# Patient Record
Sex: FEMALE | Race: WHITE
[De-identification: names, ages, dates, MRNs, and addresses within clinical notes are randomized per-mention and may not be internally consistent; named-entity substitution may affect disease eponyms.]

---

## 2017-03-22 ENCOUNTER — HOSPITAL ENCOUNTER (INPATIENT)
Dept: HOSPITAL 53 - M ED | Age: 82
LOS: 51 days | Discharge: HOME HEALTH | DRG: 881 | End: 2017-05-12
Attending: PSYCHIATRY & NEUROLOGY | Admitting: PSYCHIATRY & NEUROLOGY
Payer: MEDICARE

## 2017-03-22 VITALS — WEIGHT: 139.77 LBS | HEIGHT: 61 IN | BODY MASS INDEX: 26.39 KG/M2

## 2017-03-22 DIAGNOSIS — Z79.899: ICD-10-CM

## 2017-03-22 DIAGNOSIS — H61.23: ICD-10-CM

## 2017-03-22 DIAGNOSIS — R07.9: ICD-10-CM

## 2017-03-22 DIAGNOSIS — D64.9: ICD-10-CM

## 2017-03-22 DIAGNOSIS — Z88.8: ICD-10-CM

## 2017-03-22 DIAGNOSIS — R19.7: ICD-10-CM

## 2017-03-22 DIAGNOSIS — Z91.14: ICD-10-CM

## 2017-03-22 DIAGNOSIS — Z79.84: ICD-10-CM

## 2017-03-22 DIAGNOSIS — E11.9: ICD-10-CM

## 2017-03-22 DIAGNOSIS — Z90.49: ICD-10-CM

## 2017-03-22 DIAGNOSIS — R26.81: ICD-10-CM

## 2017-03-22 DIAGNOSIS — K21.9: ICD-10-CM

## 2017-03-22 DIAGNOSIS — M10.9: ICD-10-CM

## 2017-03-22 DIAGNOSIS — F32.9: Primary | ICD-10-CM

## 2017-03-22 DIAGNOSIS — E78.5: ICD-10-CM

## 2017-03-22 DIAGNOSIS — Z90.710: ICD-10-CM

## 2017-03-22 DIAGNOSIS — E03.9: ICD-10-CM

## 2017-03-22 DIAGNOSIS — I25.10: ICD-10-CM

## 2017-03-22 DIAGNOSIS — E87.1: ICD-10-CM

## 2017-03-22 DIAGNOSIS — I10: ICD-10-CM

## 2017-03-22 LAB
ALBUMIN SERPL BCG-MCNC: 4 GM/DL (ref 3.2–5.2)
ALBUMIN/GLOB SERPL: 1.43 {RATIO} (ref 1–1.93)
ALP SERPL-CCNC: 56 U/L (ref 45–117)
ALT SERPL W P-5'-P-CCNC: 21 U/L (ref 12–78)
ANION GAP SERPL CALC-SCNC: 9 MEQ/L (ref 8–16)
AST SERPL-CCNC: 15 U/L (ref 15–37)
BILIRUB CONJ SERPL-MCNC: 0.1 MG/DL (ref 0–0.2)
BILIRUB SERPL-MCNC: 0.5 MG/DL (ref 0.2–1)
BUN SERPL-MCNC: 14 MG/DL (ref 7–18)
CALCIUM SERPL-MCNC: 9 MG/DL (ref 8.8–10.2)
CHLORIDE SERPL-SCNC: 99 MEQ/L (ref 98–107)
CO2 SERPL-SCNC: 28 MEQ/L (ref 21–32)
CREAT SERPL-MCNC: 0.63 MG/DL (ref 0.55–1.02)
ERYTHROCYTE [DISTWIDTH] IN BLOOD BY AUTOMATED COUNT: 12.6 % (ref 11.5–14.5)
GFR SERPL CREATININE-BSD FRML MDRD: > 60 ML/MIN/{1.73_M2} (ref 32–?)
GLUCOSE SERPL-MCNC: 154 MG/DL (ref 83–110)
MCH RBC QN AUTO: 31.3 PG (ref 27–33)
MCHC RBC AUTO-ENTMCNC: 33.8 G/DL (ref 32–36.5)
MCV RBC AUTO: 92.4 FL (ref 80–96)
METHADONE UR QL SCN: NEGATIVE
PLATELET # BLD AUTO: 256 K/MM3 (ref 150–450)
POTASSIUM SERPL-SCNC: 3.7 MEQ/L (ref 3.5–5.1)
PROT SERPL-MCNC: 6.8 GM/DL (ref 6.4–8.2)
SODIUM SERPL-SCNC: 136 MEQ/L (ref 136–145)
WBC # BLD AUTO: 5.8 K/MM3 (ref 4–10)

## 2017-03-22 RX ADMIN — MONTELUKAST SODIUM SCH MG: 10 TABLET, FILM COATED ORAL at 21:00

## 2017-03-22 RX ADMIN — ROSUVASTATIN CALCIUM SCH MG: 10 TABLET, FILM COATED ORAL at 21:00

## 2017-03-23 VITALS — DIASTOLIC BLOOD PRESSURE: 68 MMHG | SYSTOLIC BLOOD PRESSURE: 182 MMHG

## 2017-03-23 VITALS — DIASTOLIC BLOOD PRESSURE: 72 MMHG | SYSTOLIC BLOOD PRESSURE: 149 MMHG

## 2017-03-23 VITALS — SYSTOLIC BLOOD PRESSURE: 149 MMHG | DIASTOLIC BLOOD PRESSURE: 76 MMHG

## 2017-03-23 VITALS — DIASTOLIC BLOOD PRESSURE: 92 MMHG | SYSTOLIC BLOOD PRESSURE: 195 MMHG

## 2017-03-23 VITALS — SYSTOLIC BLOOD PRESSURE: 154 MMHG | DIASTOLIC BLOOD PRESSURE: 79 MMHG

## 2017-03-23 LAB
ALBUMIN SERPL BCG-MCNC: 4.2 GM/DL (ref 3.2–5.2)
ALBUMIN/GLOB SERPL: 1.5 {RATIO} (ref 1–1.93)
ALP SERPL-CCNC: 58 U/L (ref 45–117)
ALT SERPL W P-5'-P-CCNC: 21 U/L (ref 12–78)
ANION GAP SERPL CALC-SCNC: 9 MEQ/L (ref 8–16)
AST SERPL-CCNC: 15 U/L (ref 15–37)
BILIRUB SERPL-MCNC: 0.6 MG/DL (ref 0.2–1)
BUN SERPL-MCNC: 12 MG/DL (ref 7–18)
CALCIUM SERPL-MCNC: 9.6 MG/DL (ref 8.8–10.2)
CHLORIDE SERPL-SCNC: 98 MEQ/L (ref 98–107)
CO2 SERPL-SCNC: 28 MEQ/L (ref 21–32)
CREAT SERPL-MCNC: 0.67 MG/DL (ref 0.55–1.02)
ERYTHROCYTE [DISTWIDTH] IN BLOOD BY AUTOMATED COUNT: 12.5 % (ref 11.5–14.5)
GFR SERPL CREATININE-BSD FRML MDRD: > 60 ML/MIN/{1.73_M2} (ref 32–?)
GLUCOSE SERPL-MCNC: 146 MG/DL (ref 83–110)
MCH RBC QN AUTO: 31.2 PG (ref 27–33)
MCHC RBC AUTO-ENTMCNC: 34 G/DL (ref 32–36.5)
MCV RBC AUTO: 91.8 FL (ref 80–96)
PLATELET # BLD AUTO: 281 K/MM3 (ref 150–450)
POTASSIUM SERPL-SCNC: 4 MEQ/L (ref 3.5–5.1)
PROT SERPL-MCNC: 7 GM/DL (ref 6.4–8.2)
SODIUM SERPL-SCNC: 135 MEQ/L (ref 136–145)
URATE SERPL-MCNC: 2.8 MG/DL (ref 2.6–6)
WBC # BLD AUTO: 7.1 K/MM3 (ref 4–10)

## 2017-03-23 RX ADMIN — LISINOPRIL SCH MG: 5 TABLET ORAL at 08:35

## 2017-03-23 RX ADMIN — METFORMIN HYDROCHLORIDE SCH MG: 500 TABLET, EXTENDED RELEASE ORAL at 08:35

## 2017-03-23 RX ADMIN — DOCUSATE SODIUM SCH MG: 100 CAPSULE, LIQUID FILLED ORAL at 08:35

## 2017-03-23 RX ADMIN — LABETALOL HCL SCH MG: 200 TABLET, FILM COATED ORAL at 21:37

## 2017-03-23 RX ADMIN — OXYCODONE HYDROCHLORIDE AND ACETAMINOPHEN SCH MG: 500 TABLET ORAL at 08:35

## 2017-03-23 RX ADMIN — OXYCODONE HYDROCHLORIDE AND ACETAMINOPHEN SCH MG: 500 TABLET ORAL at 21:37

## 2017-03-23 RX ADMIN — MONTELUKAST SODIUM SCH MG: 10 TABLET, FILM COATED ORAL at 21:37

## 2017-03-23 RX ADMIN — LEVOTHYROXINE SODIUM SCH MG: 25 TABLET ORAL at 06:15

## 2017-03-23 RX ADMIN — LABETALOL HCL SCH MG: 200 TABLET, FILM COATED ORAL at 08:35

## 2017-03-23 RX ADMIN — CARBAMIDE PEROXIDE 6.5% SCH DROP: 6.5 LIQUID AURICULAR (OTIC) at 21:00

## 2017-03-23 RX ADMIN — FERROUS SULFATE TAB 325 MG (65 MG ELEMENTAL FE) SCH MG: 325 (65 FE) TAB at 08:35

## 2017-03-23 RX ADMIN — ALLOPURINOL SCH MG: 300 TABLET ORAL at 08:35

## 2017-03-23 RX ADMIN — ROSUVASTATIN CALCIUM SCH MG: 10 TABLET, FILM COATED ORAL at 21:37

## 2017-03-23 RX ADMIN — CARBAMIDE PEROXIDE 6.5% SCH DROP: 6.5 LIQUID AURICULAR (OTIC) at 09:00

## 2017-03-23 RX ADMIN — PANTOPRAZOLE SODIUM SCH MG: 40 TABLET, DELAYED RELEASE ORAL at 08:35

## 2017-03-23 RX ADMIN — ALUMINUM HYDROXIDE, MAGNESIUM HYDROXIDE, AND SIMETHICONE PRN ML: 200; 200; 20 SUSPENSION ORAL at 08:43

## 2017-03-23 RX ADMIN — METFORMIN HYDROCHLORIDE SCH MG: 500 TABLET, EXTENDED RELEASE ORAL at 17:41

## 2017-03-23 RX ADMIN — DOCUSATE SODIUM SCH MG: 100 CAPSULE, LIQUID FILLED ORAL at 21:37

## 2017-03-23 RX ADMIN — OXYCODONE HYDROCHLORIDE AND ACETAMINOPHEN SCH MG: 500 TABLET ORAL at 15:53

## 2017-03-23 NOTE — MHHPE
DATE OF ADMISSION:  03/22/2017

 

This 81-year-old female was admitted for increased anxiety and depression and

stating to her family that she was "waiting to die."  This patient apparently,

for an unknown amount of time, has stopped taking her medications at home.  She

has had poor sleep.  She has had a history of depression and catatonia and was

treated last April.  She has stopped eating and drinking and taking her

medications.  She had a fall and broke her hip and was recently discharged from

rehabilitation.  About 3 days ago, it was noticed that she had stopped sleeping,

that she would not leave her house, she would not use her dentures, she stopped

using lipstick, she stopped smiling and laughing.  The patient had been on

Effexor, she was not sure how long she has been on it, 150 mg.  Medical history

has been positive for back surgery, hand surgery, thyroid abnormalities, high

blood pressure, and three coronary artery bypass grafts (CABGs).  She does not

remember her psychiatric history.  She has had, in the past, electroshock 
therapy

for her depression.  She saw a psychiatrist in [place], does not remember when.

Alcohol history is negative.  Drug history is negative.  She has been here from

[place] since June.  Her daughter, Dina, visits her on and off.  Her 
appetite

is down.  Her sleep is poor, she has had initial insomnia.  She is denying

suicidal ideation and has not had psychiatric followup for financial reasons and

gets her medicine from her primary care physician.  She reported some chest

heaviness on admission and was seen by Sylvia De Santiago.  She complained of some

gas.

 

MEDICAL HISTORY:  Includes depression, gout, hypertension, dyslipidemia,

hypothyroidism, non-insulin-dependent diabetes mellitus (NIDDM), coronary artery

disease (CAD), allergies, iron deficiency, history of left hip fracture.

 

SURGICAL HISTORY:  Tonsillectomy, cholecystectomy, appendectomy, nailing of left

hip, spine surgery, and hysterectomy.

 

SOCIAL HISTORY:  She is .  Nonsmoker.  Denies illicit drug use.  Resides

in [place], [place].

 

PRESENT MEDICATIONS:  Include:

- allopurinol

- calcium

- ferrous sulfate

- labetalol

- levothyroxine

- lisinopril

- metformin

- multivitamins

- montelukast

- pantoprazole

- venlafaxine

- rosuvastatin

 

MENTAL STATUS EXAMINATION:  Speech is slow, but no abnormalities of thought

process noted.  She has no loose associations.  She does not seem to be

demonstrating any abnormal or psychotic thoughts.  Judgment and insight are 
fair.

She is fully oriented.  Recent and remote memory are intact.  No disturbances of

attention or concentration.  No disturbances of language.  She has a full fund 
of

knowledge.  Her mood is low.  Her affect is flat.

 

PLAN:  Due to patient's past history of catatonia requiring electroconvulsive

therapy (ECT), we will restart her venlafaxine. Venlafaxine dose may have to be 
raised, we will observe.

I met with pts. daughter to review the history and medications used in the past 
unsuccessfully.

 

DIAGNOSIS:  Major depressive illness.

MTDD

## 2017-03-23 NOTE — HPEPDOC
Medical History and Physical


Date of Admission


Mar 22, 2017 at 22:40





History and Physical





PCP: Dr Nogueira


ATTENDING: Dr. Kristopher Connell





HPI: 81yoF admitted to [facility] for MDD, being medically examined today. Patient 

reported to her nurse this morning that she had some chest heaviness. Patient 

describes this as a pressure in her chest. She is unable to state if it changes 

with swallowing, pressing her chest, or change in position. She does not report 

that it worsens with exertion. She does not know how long it has been going on. 

She states it has been  "on and off " , she does not know for how long. She 

does not report any associated shortness of breath, radiation of discomfort, 

diaphoresis, palpitations. She does complain of some  "gas" and is having some 

belching on exam. She states she has not been sleeping or eating for at least 

the past 2-3 days. She has not been taking her pills for the past 2-3 days. 

Denies any fevers, chills, weakness, fatigue, HA, SOB, cough, palpitations, 

abdominal pain, N/V/D or changes in bowel or bladder habits.





PMHx: 


Depression


Gout


Hypertension


Dyslipidemia


Hypothyroid


NIDDM


CAD


Allergies


Iron deficiency


H/O Left hip fracture


Unsteady gait








PSHX:


CABG 


Hysterectomy


Spine surgery


Tonsillectomy


Cholecystectomy


Appendectomy


IM Nailing Left Hip 





SOCHX:


Resides in: [facility]


Marital Status: 


Kids: Daughter


Employment: Retired


Tobacco use: Nonsmoker


ETOH: Denies


Illicit Drugs: Denies


IV Drug Use: Denies


Tattoos done unprofessionally: Denies 





FAMHX:


Mother: 


Father: 


Children: Alive, well


Unexpected deaths due to medical reasons: None.





ROS:


As noted in HPI, otherwise 11pt ROS of systems reviewed and unremarkable.  


                 


PE:      


GEN:  81yoF, appears stated age. Well-nourished, well developed. No acute 

distress. Alert and oriented x 3. Pleasant, flat affect. 


HEENT: Normocephalic, atraumatic. Pupils are equal, round, and reactive to 

light. Extraocular movements are intact. No nystagmus appreciated. Sclera are 

nonicteric. Conjunctiva without injection. Nose midline. Nasal turbinates 

without bogginess. EACs with cerumen B/L.  No facial asymmetry. Moist mucous 

membranes. Dentition fair. Pharynx pink and moist, no cobblestoning. Neck supple

, trachea midline. No lymphadenopathy or thyromegaly appreciated. 


CHEST: Regular rate and rhythm, +S1, +S2


LUNGS: Clear to auscultation bilaterally. No wheezes, rales, or rhonchi. 

Breathing appears symmetric and easy. Patient is speaking in full sentences. No 

accessory muscle use.


ABD: Round, soft, non-tender, non-distended. +Bowel sounds throughout. No 

rebound or guarding. No costovertebral angle tenderness.


EXT: Pulses 2+ bilaterally dorsalis pedis and radial. No lower extremity edema 

appreciated.


SKIN: Pink, dry, warm. Capillary refill <2sec. No rashes.


NEURO: Alert and oriented x 3. Cranial nerves III-XII are intact. No focal 

deficits appreciated. 





EKG:  pending








A&P:  81yoF admitted to [facility] for MDD


1. Psych. Plan per Psychiatry. Obtain baseline EKG to assure the safety of 

psychiatric medications as they can prolong the QT interval.


2. CAD/CABG. Continue labetalol 200 mg by mouth twice a day with hold 

parameters.


3. Chest pain. EKG pending. CIP/troponin every 83. Possibly GI related. 

Mylanta given. Monitor.


4. Abdominal discomfort. Possibly GI related. Mylanta given. Also check UA/

urine culture.


4. Follow up with PCP on discharge.


5. Hyperlipidemia. Continue Crestor 5 mg daily.


6. Gout. Continue allopurinol 300 mg daily. Update uric acid.


7. Hypertension. Continue lisinopril 5 mg daily with hold parameters.


8. NIDDM. Controlled carbohydrate diet. Fingerstick blood sugar twice a day. 

Metformin 500 mg by mouth twice a day.


9. Iron deficiency. Continue ferrous sulfate 325 mg daily. Hemoglobin is noted 

to be 13.1.


10. GERD. Continue Protonix 40 mg daily. 


11. Allergies. Continue Singulair 10 mg daily.


12. Hypothyroid. Continue levothyroxine 25 g by mouth daily. TSH is within 

normal limits.


13. Cerumen B/L ear canals. Debrox drops ordered. 


14. H/O Unsteady gait. PT eval pending. 


15. MED Gardner present throughout exam.





Vital Signs





 Vital Signs








Label Value  Date Time


 


Patient Temperature 99.0 degrees F 3/23/17 0829


 


Temperature Source Core 3/23/17 0829


 


Pulse 69 3/23/17 0829


 


Respiratory Rate 16 bpm 3/23/17 0829


 


Blood Pressure Assessment 154/79 (104) 3/23/17 0829


 


Bedside Pulse Oximetry 99 % 3/23/17 0829














Item Value  Date Time


 


Oxygen Delivery Method Room Air 3/23/17 0829











Laboratory Data


Labs 24H


 Laboratory Tests 2


3/22/17 20:10: 


Acetaminophen Level < 2.0L, Aspartate Amino Transf (AST/SGOT) 15, Alanine 

Aminotransferase (ALT/SGPT) 21, Alkaline Phosphatase 56, Total Bilirubin 0.5, 

Direct Bilirubin 0.1, Albumin 4.0, Albumin/Globulin Ratio 1.43, Anion Gap 9, 

Calcium Level 9.0, Ethyl Alcohol Level < 0.003, Glomerular Filtration Rate > 

60.0, Salicylates Level < 1.7L, Thyroid Stimulating Hormone (TSH) 1.610, Total 

Protein 6.8, Urine Amphetamines Screen NEGATIVE, Urine Benzodiazepines Screen 

NEGATIVE, Urine Opiates Screen NEGATIVE, Urine Barbiturates Screen NEGATIVE, 

Urine Cannabinoids Screen NEGATIVE, Urine Cocaine Metabolite Screen NEGATIVE, 

Urine Methadone Screen NEGATIVE, Urine Phencyclidine Screen NEGATIVE


3/23/17 11:10: 


CBC/BMP


 Laboratory Tests


3/22/17 20:10








Red Blood Count 4.19, Mean Corpuscular Volume 92.4, Mean Corpuscular Hemoglobin 

31.3, Mean Corpuscular Hemoglobin Concent 33.8, Red Cell Distribution Width 12.6





Home Medications


Scheduled


(Calcium 500+D 500-200 mg-Unit) 1 Tab Tab 1 TAB PO DAILY  


   TAKES AT LUNCHTIME 


Allopurinol (Zyloprim) 300 Mg Tab 300 MG PO DAILY  


Ascorbic Acid (Ascorbic Acid) 500 Mg Tab 500 MG PO TID  


Ferrous Sulfate (Ferrous Sulfate) 325 Mg Tab 325 MG PO DAILY  


Labetalol HCl (Labetalol HCl) 200 Mg Tab 200 MG PO BID  


Levothyroxine Sodium (Synthroid) 25 Mcg Tab 25 MCG PO DAILY  


Lisinopril (Lisinopril) 5 Mg Tab 5 MG PO DAILY  


Metformin Hydrochloride (Metformin HCl ER) 500 Mg Tab 500 MG PO BID  


Mirtazapine (Mirtazapine) 15 Mg Tab 15 MG PO QHS  


Montelukast Sodium (Singulair) 10 Mg Tab 10 MG PO QHS  


Multivitamins *[facility] STOCKED* (Thera M Plus *[facility] STOCKED*) 1 Tab Tab 1 TAB PO 

DAILY  


   TAKES AT LUNCHTIME 


Pantoprazole Sodium (Pantoprazole Sodium) 40 Mg Tab 40 MG PO DAILY  


Rosuvastatin Calcium (Crestor) 5 Mg Tab 5 MG PO QHS  


Venlafaxine Hydrochloride (Effexor Xr) 150 Mg Cap 150 MG PO DAILY  





Scheduled PRN


Acetaminophen (Tylenol) 325 Mg Tab 650 MG PO Q4H PRN PRN PAIN 





Allergies


Coded Allergies:  


     Tramadol (Unverified  Adverse Reaction, Intermediate, INCREASES THE EFFEXOR

'S POTENCY, 16)








Sylvia De Santiago Mar 23, 2017 11:36

## 2017-03-23 NOTE — ECGEPIP
Stationary ECG Study

                              [facility]

                                       

                                       Test Date:    2017

Pat Name:     NANDO HUERTA          Department:   

Patient ID:   [id number]                 Room:         [place] [address]

Gender:       F                        Technician:   TATIANA

:          1935               Requested By: Sylvia De Santiago 

Order Number: [id number]     Reading MD:   Tania Beltran

                                 Measurements

Intervals                              Axis          

Rate:         65                       P:            54

AR:           199                      QRS:          -9

QRSD:         89                       T:            17

QT:           425                                    

QTc:          442                                    

                           Interpretive Statements

SINUS RHYTHM WITH OCCASIONAL VENTRICULAR PREMATURE COMPLEXES

SIMILAR 2:57 SAME DAY 

 

Electronically Signed On 3- 22:17:42 EDT by Tania Beltran

## 2017-03-23 NOTE — ECGEPIP
Stationary ECG Study

                              [facility]

                                       

                                       Test Date:    2017

Pat Name:     NANDO HUERTA          Department:   

Patient ID:   [id number]                 Room:         [place] [address]

Gender:       F                        Technician:   TRAVIS

:          1935               Requested By: Glenda Ortiz 

Order Number: [id number]     Reading MD:   Tania Beltran

                                 Measurements

Intervals                              Axis          

Rate:         64                       P:            57

WV:           199                      QRS:          -7

QRSD:         94                       T:            48

QT:           434                                    

QTc:          450                                    

                           Interpretive Statements

SINUS RHYTHM WITH OCCASIONAL VENTRICULAR PREMATURE COMPLEXES

POSSIBLE LEFT ATRIAL ENLARGEMENT

PVC'S ARE NEW SINCE 16

Electronically Signed On 3- 22:07:33 EDT by Tania Beltran

## 2017-03-24 VITALS — SYSTOLIC BLOOD PRESSURE: 121 MMHG | DIASTOLIC BLOOD PRESSURE: 57 MMHG

## 2017-03-24 VITALS — SYSTOLIC BLOOD PRESSURE: 140 MMHG | DIASTOLIC BLOOD PRESSURE: 67 MMHG

## 2017-03-24 VITALS — DIASTOLIC BLOOD PRESSURE: 58 MMHG | SYSTOLIC BLOOD PRESSURE: 131 MMHG

## 2017-03-24 RX ADMIN — METFORMIN HYDROCHLORIDE SCH MG: 500 TABLET, EXTENDED RELEASE ORAL at 17:37

## 2017-03-24 RX ADMIN — PANTOPRAZOLE SODIUM SCH MG: 40 TABLET, DELAYED RELEASE ORAL at 09:24

## 2017-03-24 RX ADMIN — ALLOPURINOL SCH MG: 300 TABLET ORAL at 09:25

## 2017-03-24 RX ADMIN — FERROUS SULFATE TAB 325 MG (65 MG ELEMENTAL FE) SCH MG: 325 (65 FE) TAB at 09:24

## 2017-03-24 RX ADMIN — METFORMIN HYDROCHLORIDE SCH MG: 500 TABLET, EXTENDED RELEASE ORAL at 09:25

## 2017-03-24 RX ADMIN — LABETALOL HCL SCH MG: 200 TABLET, FILM COATED ORAL at 20:45

## 2017-03-24 RX ADMIN — DOCUSATE SODIUM SCH MG: 100 CAPSULE, LIQUID FILLED ORAL at 09:24

## 2017-03-24 RX ADMIN — MONTELUKAST SODIUM SCH MG: 10 TABLET, FILM COATED ORAL at 20:45

## 2017-03-24 RX ADMIN — DOCUSATE SODIUM SCH MG: 100 CAPSULE, LIQUID FILLED ORAL at 20:45

## 2017-03-24 RX ADMIN — CARBAMIDE PEROXIDE 6.5% SCH DROP: 6.5 LIQUID AURICULAR (OTIC) at 09:39

## 2017-03-24 RX ADMIN — LEVOTHYROXINE SODIUM SCH MG: 25 TABLET ORAL at 06:25

## 2017-03-24 RX ADMIN — OXYCODONE HYDROCHLORIDE AND ACETAMINOPHEN SCH MG: 500 TABLET ORAL at 09:25

## 2017-03-24 RX ADMIN — VENLAFAXINE HYDROCHLORIDE SCH MG: 37.5 CAPSULE, EXTENDED RELEASE ORAL at 09:24

## 2017-03-24 RX ADMIN — OXYCODONE HYDROCHLORIDE AND ACETAMINOPHEN SCH MG: 500 TABLET ORAL at 15:52

## 2017-03-24 RX ADMIN — CARBAMIDE PEROXIDE 6.5% SCH DROP: 6.5 LIQUID AURICULAR (OTIC) at 22:17

## 2017-03-24 RX ADMIN — LABETALOL HCL SCH MG: 200 TABLET, FILM COATED ORAL at 09:25

## 2017-03-24 RX ADMIN — OXYCODONE HYDROCHLORIDE AND ACETAMINOPHEN SCH MG: 500 TABLET ORAL at 20:45

## 2017-03-24 RX ADMIN — LISINOPRIL SCH MG: 5 TABLET ORAL at 09:25

## 2017-03-24 RX ADMIN — ROSUVASTATIN CALCIUM SCH MG: 10 TABLET, FILM COATED ORAL at 20:45

## 2017-03-24 NOTE — IPN
DATE:  03/24/2017

 

I met with Cindy Mena's daughter and got the following information.

Apparently, her difficulties began in 2004.  She was in [place] at the time and

her depression was apparently so severe that she had to receive

electroconvulsive therapy (ECT).  She was hospitalized this last year by Dr. Nogueira in [place] and she fell and broke her hip and the daughter thinks this

was due to her taking Seroquel.  She was placed in rehabilitation and was under

the care of Dr. Guillen and her mental difficulties seemed to improve.  She was

discharged by Dr. Guillen in late May or June and has been home.  According to

the daughter, her clinical course from July to Christmas was good with her

driving around and participating in activities, etc.  She said, then recently,

perhaps in the last 3 weeks she began to "go down."  She was picking and 
choosing

which medication was taking and she seemed to have a sense of good and bad days,

including some medium highs and lows alternating.  The daughter herself states,

she herself is bipolar.

 

Plan of treatment at this time is to restart the patient's medication at her

previous dose with a perhaps increase in an attempt to avoid the patient 
becoming

so depressed that she would require ECT again.

 

Diagnosis is bipolar 2 disorder.

MTDD

## 2017-03-25 VITALS — DIASTOLIC BLOOD PRESSURE: 58 MMHG | SYSTOLIC BLOOD PRESSURE: 132 MMHG

## 2017-03-25 VITALS — SYSTOLIC BLOOD PRESSURE: 142 MMHG | DIASTOLIC BLOOD PRESSURE: 73 MMHG

## 2017-03-25 RX ADMIN — VENLAFAXINE HYDROCHLORIDE SCH MG: 37.5 CAPSULE, EXTENDED RELEASE ORAL at 08:56

## 2017-03-25 RX ADMIN — OXYCODONE HYDROCHLORIDE AND ACETAMINOPHEN SCH MG: 500 TABLET ORAL at 08:55

## 2017-03-25 RX ADMIN — LEVOTHYROXINE SODIUM SCH MG: 25 TABLET ORAL at 05:56

## 2017-03-25 RX ADMIN — FERROUS SULFATE TAB 325 MG (65 MG ELEMENTAL FE) SCH MG: 325 (65 FE) TAB at 08:55

## 2017-03-25 RX ADMIN — DOCUSATE SODIUM SCH MG: 100 CAPSULE, LIQUID FILLED ORAL at 08:55

## 2017-03-25 RX ADMIN — METFORMIN HYDROCHLORIDE SCH MG: 500 TABLET, EXTENDED RELEASE ORAL at 17:38

## 2017-03-25 RX ADMIN — LABETALOL HCL SCH MG: 200 TABLET, FILM COATED ORAL at 09:10

## 2017-03-25 RX ADMIN — DOCUSATE SODIUM SCH MG: 100 CAPSULE, LIQUID FILLED ORAL at 21:59

## 2017-03-25 RX ADMIN — PANTOPRAZOLE SODIUM SCH MG: 40 TABLET, DELAYED RELEASE ORAL at 08:55

## 2017-03-25 RX ADMIN — OXYCODONE HYDROCHLORIDE AND ACETAMINOPHEN SCH MG: 500 TABLET ORAL at 21:59

## 2017-03-25 RX ADMIN — ROSUVASTATIN CALCIUM SCH MG: 10 TABLET, FILM COATED ORAL at 21:59

## 2017-03-25 RX ADMIN — CARBAMIDE PEROXIDE 6.5% SCH DROP: 6.5 LIQUID AURICULAR (OTIC) at 22:00

## 2017-03-25 RX ADMIN — LISINOPRIL SCH MG: 5 TABLET ORAL at 09:10

## 2017-03-25 RX ADMIN — CARBAMIDE PEROXIDE 6.5% SCH DROP: 6.5 LIQUID AURICULAR (OTIC) at 09:00

## 2017-03-25 RX ADMIN — ALLOPURINOL SCH MG: 300 TABLET ORAL at 08:55

## 2017-03-25 RX ADMIN — LABETALOL HCL SCH MG: 200 TABLET, FILM COATED ORAL at 22:02

## 2017-03-25 RX ADMIN — MONTELUKAST SODIUM SCH MG: 10 TABLET, FILM COATED ORAL at 21:59

## 2017-03-25 RX ADMIN — METFORMIN HYDROCHLORIDE SCH MG: 500 TABLET, EXTENDED RELEASE ORAL at 08:55

## 2017-03-25 RX ADMIN — OXYCODONE HYDROCHLORIDE AND ACETAMINOPHEN SCH MG: 500 TABLET ORAL at 16:23

## 2017-03-25 NOTE — IPN
DATE:  03/25/2017

 

I met with Cindy Mena today.  She continues to lay in bed.  Mood does not

appear to be improved.  I have increased her Effexor to 225 mg.  She is on a

number of medications besides from my order.  She is on trazodone for insomnia.

She said she is sleeping alright, but has never slept well.  She is on Crestor,

Protonix, Singulair, metformin, lisinopril, Synthroid, labetalol, iron, vitamin

C, and allopurinol.  That is a number of medications.  I am notifying the staff

to watch her carefully for her balance.  She did not complain of sleep

difficulties last night and she has had sleep difficulties always.

 

Speech is slow and quiet..  Thought process shows some poverty.  No loose

associations.  No psychotic thoughts.  Judgment and insight are fair.  Fully

oriented.  Recent and remote memory are good.  Attention and concentration are

fair.  No disturbance of language.  Full fund of knowledge.  Low mood,  sad

affect.

 

DIAGNOSIS:  Major depression.

 

Continue observation and treatment.

MTDD

## 2017-03-26 VITALS — SYSTOLIC BLOOD PRESSURE: 142 MMHG | DIASTOLIC BLOOD PRESSURE: 74 MMHG

## 2017-03-26 VITALS — SYSTOLIC BLOOD PRESSURE: 140 MMHG | DIASTOLIC BLOOD PRESSURE: 73 MMHG

## 2017-03-26 VITALS — DIASTOLIC BLOOD PRESSURE: 84 MMHG | SYSTOLIC BLOOD PRESSURE: 142 MMHG

## 2017-03-26 RX ADMIN — CARBAMIDE PEROXIDE 6.5% SCH DROP: 6.5 LIQUID AURICULAR (OTIC) at 09:15

## 2017-03-26 RX ADMIN — PANTOPRAZOLE SODIUM SCH MG: 40 TABLET, DELAYED RELEASE ORAL at 08:50

## 2017-03-26 RX ADMIN — METFORMIN HYDROCHLORIDE SCH MG: 500 TABLET, EXTENDED RELEASE ORAL at 08:50

## 2017-03-26 RX ADMIN — DOCUSATE SODIUM SCH MG: 100 CAPSULE, LIQUID FILLED ORAL at 20:30

## 2017-03-26 RX ADMIN — OXYCODONE HYDROCHLORIDE AND ACETAMINOPHEN SCH MG: 500 TABLET ORAL at 15:43

## 2017-03-26 RX ADMIN — ALLOPURINOL SCH MG: 300 TABLET ORAL at 08:50

## 2017-03-26 RX ADMIN — LEVOTHYROXINE SODIUM SCH MG: 25 TABLET ORAL at 05:58

## 2017-03-26 RX ADMIN — METFORMIN HYDROCHLORIDE SCH MG: 500 TABLET, EXTENDED RELEASE ORAL at 17:01

## 2017-03-26 RX ADMIN — MONTELUKAST SODIUM SCH MG: 10 TABLET, FILM COATED ORAL at 20:30

## 2017-03-26 RX ADMIN — LABETALOL HCL SCH MG: 200 TABLET, FILM COATED ORAL at 20:31

## 2017-03-26 RX ADMIN — FERROUS SULFATE TAB 325 MG (65 MG ELEMENTAL FE) SCH MG: 325 (65 FE) TAB at 08:49

## 2017-03-26 RX ADMIN — OXYCODONE HYDROCHLORIDE AND ACETAMINOPHEN SCH MG: 500 TABLET ORAL at 20:30

## 2017-03-26 RX ADMIN — CARBAMIDE PEROXIDE 6.5% SCH DROP: 6.5 LIQUID AURICULAR (OTIC) at 20:29

## 2017-03-26 RX ADMIN — DOCUSATE SODIUM SCH MG: 100 CAPSULE, LIQUID FILLED ORAL at 08:49

## 2017-03-26 RX ADMIN — LABETALOL HCL SCH MG: 200 TABLET, FILM COATED ORAL at 09:14

## 2017-03-26 RX ADMIN — LISINOPRIL SCH MG: 5 TABLET ORAL at 09:14

## 2017-03-26 RX ADMIN — OXYCODONE HYDROCHLORIDE AND ACETAMINOPHEN SCH MG: 500 TABLET ORAL at 08:49

## 2017-03-26 RX ADMIN — ROSUVASTATIN CALCIUM SCH MG: 10 TABLET, FILM COATED ORAL at 20:30

## 2017-03-26 RX ADMIN — VENLAFAXINE HYDROCHLORIDE SCH MG: 75 CAPSULE, EXTENDED RELEASE ORAL at 08:50

## 2017-03-26 NOTE — IPN
DATE:  03/26/2017

 

No improvement noted in Cindy Mena.  She continues to lay in bed and

isolate.  She states she is eating minimally.  She is slow to respond to

thinking, describing her mood or her thinking patterns.  She is presently on 225

mg of Effexor.  My concern is she has in the past required electroconvulsive

therapy (ECT).

 

MENTAL STATUS EXAMINATION:  Speech is quiet with short answers.  She has a

poverty of thought.  No loose associations or abnormal or psychotic thoughts.

Judgment and insight are poor.  Orientation is in three spheres.  Recent and

remote memory seem intact.  Attention and concentration are adequate.  No

disturbances of language.  Mood is low.  Affect is flat and sad.

 

IMPRESSION:  Major depression.

 

PLAN:  May add an augmenting medication to her treatment regimen.

## 2017-03-27 VITALS — SYSTOLIC BLOOD PRESSURE: 146 MMHG | DIASTOLIC BLOOD PRESSURE: 69 MMHG

## 2017-03-27 VITALS — SYSTOLIC BLOOD PRESSURE: 142 MMHG | DIASTOLIC BLOOD PRESSURE: 88 MMHG

## 2017-03-27 VITALS — SYSTOLIC BLOOD PRESSURE: 150 MMHG | DIASTOLIC BLOOD PRESSURE: 76 MMHG

## 2017-03-27 LAB
ALBUMIN SERPL BCG-MCNC: 3.9 GM/DL (ref 3.2–5.2)
ALBUMIN/GLOB SERPL: 1.63 {RATIO} (ref 1–1.93)
ALP SERPL-CCNC: 57 U/L (ref 45–117)
ALT SERPL W P-5'-P-CCNC: 20 U/L (ref 12–78)
ANION GAP SERPL CALC-SCNC: 10 MEQ/L (ref 8–16)
AST SERPL-CCNC: 16 U/L (ref 15–37)
BASOPHILS # BLD AUTO: 0 K/MM3 (ref 0–0.2)
BASOPHILS NFR BLD AUTO: 0.3 % (ref 0–1)
BILIRUB SERPL-MCNC: 0.5 MG/DL (ref 0.2–1)
BUN SERPL-MCNC: 10 MG/DL (ref 7–18)
CALCIUM SERPL-MCNC: 8.8 MG/DL (ref 8.8–10.2)
CHLORIDE SERPL-SCNC: 97 MEQ/L (ref 98–107)
CO2 SERPL-SCNC: 24 MEQ/L (ref 21–32)
CREAT SERPL-MCNC: 0.54 MG/DL (ref 0.55–1.02)
EOSINOPHIL # BLD AUTO: 0 K/MM3 (ref 0–0.5)
EOSINOPHIL NFR BLD AUTO: 0.3 % (ref 0–3)
ERYTHROCYTE [DISTWIDTH] IN BLOOD BY AUTOMATED COUNT: 12.5 % (ref 11.5–14.5)
GFR SERPL CREATININE-BSD FRML MDRD: > 60 ML/MIN/{1.73_M2} (ref 32–?)
GLUCOSE SERPL-MCNC: 124 MG/DL (ref 83–110)
LARGE UNSTAINED CELL #: 0.1 K/MM3 (ref 0–0.4)
LARGE UNSTAINED CELL %: 1.2 % (ref 0–4)
LYMPHOCYTES # BLD AUTO: 0.9 K/MM3 (ref 1.5–4.5)
LYMPHOCYTES NFR BLD AUTO: 13.8 % (ref 24–44)
MCH RBC QN AUTO: 31.6 PG (ref 27–33)
MCHC RBC AUTO-ENTMCNC: 35 G/DL (ref 32–36.5)
MCV RBC AUTO: 90.3 FL (ref 80–96)
MONOCYTES # BLD AUTO: 0.3 K/MM3 (ref 0–0.8)
MONOCYTES NFR BLD AUTO: 4.3 % (ref 0–5)
NEUTROPHILS # BLD AUTO: 5.2 K/MM3 (ref 1.8–7.7)
NEUTROPHILS NFR BLD AUTO: 80 % (ref 36–66)
PLATELET # BLD AUTO: 232 K/MM3 (ref 150–450)
POTASSIUM SERPL-SCNC: 4.2 MEQ/L (ref 3.5–5.1)
PROT SERPL-MCNC: 6.3 GM/DL (ref 6.4–8.2)
SODIUM SERPL-SCNC: 131 MEQ/L (ref 136–145)
WBC # BLD AUTO: 6.4 K/MM3 (ref 4–10)

## 2017-03-27 RX ADMIN — LABETALOL HCL SCH MG: 200 TABLET, FILM COATED ORAL at 08:34

## 2017-03-27 RX ADMIN — DOCUSATE SODIUM SCH MG: 100 CAPSULE, LIQUID FILLED ORAL at 08:34

## 2017-03-27 RX ADMIN — DOCUSATE SODIUM SCH MG: 100 CAPSULE, LIQUID FILLED ORAL at 20:55

## 2017-03-27 RX ADMIN — LISINOPRIL SCH MG: 5 TABLET ORAL at 08:33

## 2017-03-27 RX ADMIN — MONTELUKAST SODIUM SCH MG: 10 TABLET, FILM COATED ORAL at 21:00

## 2017-03-27 RX ADMIN — METFORMIN HYDROCHLORIDE SCH MG: 500 TABLET, EXTENDED RELEASE ORAL at 18:09

## 2017-03-27 RX ADMIN — PANTOPRAZOLE SODIUM SCH MG: 40 TABLET, DELAYED RELEASE ORAL at 08:33

## 2017-03-27 RX ADMIN — METFORMIN HYDROCHLORIDE SCH MG: 500 TABLET, EXTENDED RELEASE ORAL at 08:33

## 2017-03-27 RX ADMIN — ALLOPURINOL SCH MG: 300 TABLET ORAL at 08:34

## 2017-03-27 RX ADMIN — FERROUS SULFATE TAB 325 MG (65 MG ELEMENTAL FE) SCH MG: 325 (65 FE) TAB at 08:33

## 2017-03-27 RX ADMIN — OXYCODONE HYDROCHLORIDE AND ACETAMINOPHEN SCH MG: 500 TABLET ORAL at 08:34

## 2017-03-27 RX ADMIN — LEVOTHYROXINE SODIUM SCH MG: 25 TABLET ORAL at 06:56

## 2017-03-27 RX ADMIN — ROSUVASTATIN CALCIUM SCH MG: 10 TABLET, FILM COATED ORAL at 20:59

## 2017-03-27 RX ADMIN — LABETALOL HCL SCH MG: 200 TABLET, FILM COATED ORAL at 20:58

## 2017-03-27 RX ADMIN — VENLAFAXINE HYDROCHLORIDE SCH MG: 75 CAPSULE, EXTENDED RELEASE ORAL at 08:33

## 2017-03-27 RX ADMIN — OXYCODONE HYDROCHLORIDE AND ACETAMINOPHEN SCH MG: 500 TABLET ORAL at 15:38

## 2017-03-27 RX ADMIN — OXYCODONE HYDROCHLORIDE AND ACETAMINOPHEN SCH MG: 500 TABLET ORAL at 20:55

## 2017-03-27 NOTE — IPN
DATE:  03/27/2017

 

Cindy Mena is presently on venlafaxine 225 mg, as well as, her medical

treatment.  She complained of diarrhea last night.  She is laying in bed when I

spoke with her.  She has cooperated in being outside on the unit with other

patients and out of her room.  I have referred her gastric problems to nursing.

Perhaps, she is slightly more talkative today.

 

MENTAL STATUS EXAMINATION:  Her speech is slow.  Her thought processes reveal a

certain poverty of thought.  She has no loose associations.  She has no psychotic

thoughts.  Her judgment and insight are fair.  She is fully oriented.  Recent and

remote memory seem intact.  No difficulties of attention and concentration.  No

disturbances of language.  She has a full fund of knowledge.  Her mood is low.

Her affect is flat.

 

IMPRESSION:  Major depressive illness.

 

The major concern is restoring her venlafaxine dose that she had stopped and

attempting to get her to recover from the severe depressive episode.

## 2017-03-27 NOTE — IPNPDOC
Subjective


Date Seen


The patient was seen on 3/27/17.





Subjective


Chief Complaint/HPI


The patient is a 81-year-old female admitted with a reason for visit of Major 

Depressive Disorder.


Events since last encounter


Requested to evaluate patient related to loose stools.


The patient has a difficult time providing history. 


She states she has had diarrhea during the night, she reports 1 episode. 


She states it occurred yesterday. 


She states this morning 1. 


No further episodes today. She states she has not been eating or drinking as 

much. She reports some lower abdominal discomfort which is cramping..


She does not report any nausea or vomiting.


No fevers or chills.


She does not report urinary complaints.


She has chronic low back pain however she states it has been unchanged.


ENT:  Denies: Dysphagia, Ear Pain, Head Aches


Pulmonary:  Denies: Cough, Dyspnea


Cardiovascular:  Denies: Chest Pain, Lt Headedness, Orthopnea, Palpitations, 

Paroxysmal Noc. Dyspnea


Musculoskeletal:  Denies: Back Pain, Joint Pain, Muscle Pain, Neck Pain, Spasms





Objective


Physical Examination


General Exam:  Positive: Alert


Eye Exam:  Positive: PERRLA


ENT Exam:  Positive: Atraumatic, Mucous membr. moist/pink, Pharynx Normal


Neck Exam:  Positive: Supple, 


   Negative: JVD, thyromegaly


Chest Exam:  Positive: Clear to auscultation, Normal air movement


Heart Exam:  Positive: Normal S1, Normal S2, Rate Normal, Regular Rhythm, 


   Negative: Murmurs, Rubs


Abdomen Exam:  Positive: Normal bowel sounds, Soft, Tenderness (very slight TTP 

LLQ)


Skin Exam:  Positive: Nl turgor and temperature


Neuro Exam:  Positive: Other (ambulates with walker)





Assessment /Plan


Problems





(1) Loose stools


Status:  Acute


Problem Text:  


* Check CBC/CMP


* UA/urine culture


* GI panel


* Encourage fluids. 


* Monitor. 





(2) CAD (coronary artery disease)


Status:  Chronic


Response to Treatment:  Stable


Problem Text:  


* Continue labetalol with hold parameters.





(3) Hyperlipidemia


Status:  Chronic


Response to Treatment:  Stable


Problem Text:  


* Continue Crestor





(4) Hypothyroidism


Status:  Chronic


Response to Treatment:  Stable


Problem Text:  


* Continue Synthroid





(5) HTN (hypertension)


Status:  Chronic


Response to Treatment:  Stable


Problem Text:  


* Lisinopril with hold parameters





(6) Diabetes mellitus


Status:  Chronic


Response to Treatment:  Stable


Problem Text:  


* Metformin


* Blood sugar 108 this a.m.





(7) Gout


Status:  Chronic


Response to Treatment:  Stable


Problem Text:  


* Allopurinol





(8) Anemia


Status:  Acute


Problem Text:  


* Iron supplement








Plan/VTE


VTE Prophylaxis Ordered?:  No (Ambulatory)





VS, I&O, 24H, Fishbone


Vital Signs/I&O





 Vital Signs








  Date Time  Temp Pulse Resp B/P Pulse Ox O2 Delivery O2 Flow Rate FiO2


 


3/27/17 06:09 98.8 77 18 146/69    


 


3/26/17 06:22      Room Air  


 


3/23/17 21:50     99   











Laboratory Data


24H LABS


 Laboratory Tests 2


3/26/17 16:58: Bedside Glucose (Misc Panel) 160H


3/27/17 06:04: Bedside Glucose (Misc Panel) 108


Microbiology





 Microbiology


3/23/17 Urine Culture - Final, Complete








Sylvia De Santiago Mar 27, 2017 10:57

## 2017-03-28 VITALS — SYSTOLIC BLOOD PRESSURE: 133 MMHG | DIASTOLIC BLOOD PRESSURE: 66 MMHG

## 2017-03-28 VITALS — DIASTOLIC BLOOD PRESSURE: 63 MMHG | SYSTOLIC BLOOD PRESSURE: 141 MMHG

## 2017-03-28 VITALS — DIASTOLIC BLOOD PRESSURE: 77 MMHG | SYSTOLIC BLOOD PRESSURE: 153 MMHG

## 2017-03-28 RX ADMIN — ROSUVASTATIN CALCIUM SCH MG: 10 TABLET, FILM COATED ORAL at 20:45

## 2017-03-28 RX ADMIN — LABETALOL HCL SCH MG: 200 TABLET, FILM COATED ORAL at 09:17

## 2017-03-28 RX ADMIN — OXYCODONE HYDROCHLORIDE AND ACETAMINOPHEN SCH MG: 500 TABLET ORAL at 20:44

## 2017-03-28 RX ADMIN — METFORMIN HYDROCHLORIDE SCH MG: 500 TABLET, EXTENDED RELEASE ORAL at 17:25

## 2017-03-28 RX ADMIN — DOCUSATE SODIUM SCH MG: 100 CAPSULE, LIQUID FILLED ORAL at 20:45

## 2017-03-28 RX ADMIN — LISINOPRIL SCH MG: 5 TABLET ORAL at 09:16

## 2017-03-28 RX ADMIN — OXYCODONE HYDROCHLORIDE AND ACETAMINOPHEN SCH MG: 500 TABLET ORAL at 15:34

## 2017-03-28 RX ADMIN — DOCUSATE SODIUM SCH MG: 100 CAPSULE, LIQUID FILLED ORAL at 09:16

## 2017-03-28 RX ADMIN — MONTELUKAST SODIUM SCH MG: 10 TABLET, FILM COATED ORAL at 20:44

## 2017-03-28 RX ADMIN — METFORMIN HYDROCHLORIDE SCH MG: 500 TABLET, EXTENDED RELEASE ORAL at 09:16

## 2017-03-28 RX ADMIN — VENLAFAXINE HYDROCHLORIDE SCH MG: 75 CAPSULE, EXTENDED RELEASE ORAL at 09:21

## 2017-03-28 RX ADMIN — ALLOPURINOL SCH MG: 300 TABLET ORAL at 09:17

## 2017-03-28 RX ADMIN — LEVOTHYROXINE SODIUM SCH MG: 25 TABLET ORAL at 06:00

## 2017-03-28 RX ADMIN — FERROUS SULFATE TAB 325 MG (65 MG ELEMENTAL FE) SCH MG: 325 (65 FE) TAB at 09:16

## 2017-03-28 RX ADMIN — OXYCODONE HYDROCHLORIDE AND ACETAMINOPHEN SCH MG: 500 TABLET ORAL at 09:17

## 2017-03-28 RX ADMIN — PANTOPRAZOLE SODIUM SCH MG: 40 TABLET, DELAYED RELEASE ORAL at 09:16

## 2017-03-28 RX ADMIN — LABETALOL HCL SCH MG: 200 TABLET, FILM COATED ORAL at 20:45

## 2017-03-28 NOTE — IPN
DATE:  03/28/2017

 

I met with Cindy today.  She was out of bed when I spoke with her and she

smiled and was able to make conversation.  She still is not saying she feels that

much better but she was able to engage and her affect was significantly brighter.

I am hoping that this is an indication that her depression is improving due to

the fact that in her past, she has had to get ECT.

 

MENTAL STATUS EXAMINATION: Her speech is slightly improved in rate and volume and

there is less poverty of thought. There are no loose associations.  She is not

psychotic thoughts.  Her judgment and insight are fair.  She is fully oriented.

Her recent and remote memory is seen intact.  There were no difficulties with

attention or concentration. There were disturbances of language and a full fund

of knowledge.  Her mood is slightly improving and her affect is also slightly

brighter.

 

IMPRESSION:

Major depression illness.

 

No change in medication at this time.  Her gastrointestinal difficulties are

being addressed by Sylvia De Santiago.

## 2017-03-29 VITALS — DIASTOLIC BLOOD PRESSURE: 70 MMHG | SYSTOLIC BLOOD PRESSURE: 148 MMHG

## 2017-03-29 VITALS — DIASTOLIC BLOOD PRESSURE: 81 MMHG | SYSTOLIC BLOOD PRESSURE: 148 MMHG

## 2017-03-29 LAB
ALBUMIN SERPL BCG-MCNC: 3.6 GM/DL (ref 3.2–5.2)
ALBUMIN/GLOB SERPL: 1.38 {RATIO} (ref 1–1.93)
ALP SERPL-CCNC: 54 U/L (ref 45–117)
ALT SERPL W P-5'-P-CCNC: 20 U/L (ref 12–78)
ANION GAP SERPL CALC-SCNC: 8 MEQ/L (ref 8–16)
AST SERPL-CCNC: 15 U/L (ref 15–37)
BILIRUB SERPL-MCNC: 0.5 MG/DL (ref 0.2–1)
BUN SERPL-MCNC: 9 MG/DL (ref 7–18)
CALCIUM SERPL-MCNC: 9 MG/DL (ref 8.8–10.2)
CHLORIDE SERPL-SCNC: 95 MEQ/L (ref 98–107)
CO2 SERPL-SCNC: 28 MEQ/L (ref 21–32)
CREAT SERPL-MCNC: 0.57 MG/DL (ref 0.55–1.02)
GFR SERPL CREATININE-BSD FRML MDRD: > 60 ML/MIN/{1.73_M2} (ref 32–?)
GLUCOSE SERPL-MCNC: 100 MG/DL (ref 83–110)
POTASSIUM SERPL-SCNC: 3.8 MEQ/L (ref 3.5–5.1)
PROT SERPL-MCNC: 6.2 GM/DL (ref 6.4–8.2)
SODIUM SERPL-SCNC: 131 MEQ/L (ref 136–145)

## 2017-03-29 RX ADMIN — METFORMIN HYDROCHLORIDE SCH MG: 500 TABLET, EXTENDED RELEASE ORAL at 17:02

## 2017-03-29 RX ADMIN — OXYCODONE HYDROCHLORIDE AND ACETAMINOPHEN SCH MG: 500 TABLET ORAL at 08:27

## 2017-03-29 RX ADMIN — LEVOTHYROXINE SODIUM SCH MG: 25 TABLET ORAL at 06:21

## 2017-03-29 RX ADMIN — LABETALOL HCL SCH MG: 200 TABLET, FILM COATED ORAL at 08:27

## 2017-03-29 RX ADMIN — LISINOPRIL SCH MG: 5 TABLET ORAL at 08:27

## 2017-03-29 RX ADMIN — MONTELUKAST SODIUM SCH MG: 10 TABLET, FILM COATED ORAL at 20:45

## 2017-03-29 RX ADMIN — DOCUSATE SODIUM SCH MG: 100 CAPSULE, LIQUID FILLED ORAL at 20:45

## 2017-03-29 RX ADMIN — METFORMIN HYDROCHLORIDE SCH MG: 500 TABLET, EXTENDED RELEASE ORAL at 08:26

## 2017-03-29 RX ADMIN — PANTOPRAZOLE SODIUM SCH MG: 40 TABLET, DELAYED RELEASE ORAL at 08:27

## 2017-03-29 RX ADMIN — OXYCODONE HYDROCHLORIDE AND ACETAMINOPHEN SCH MG: 500 TABLET ORAL at 20:45

## 2017-03-29 RX ADMIN — DOCUSATE SODIUM SCH MG: 100 CAPSULE, LIQUID FILLED ORAL at 08:26

## 2017-03-29 RX ADMIN — ROSUVASTATIN CALCIUM SCH MG: 10 TABLET, FILM COATED ORAL at 20:45

## 2017-03-29 RX ADMIN — ALLOPURINOL SCH MG: 300 TABLET ORAL at 08:27

## 2017-03-29 RX ADMIN — OXYCODONE HYDROCHLORIDE AND ACETAMINOPHEN SCH MG: 500 TABLET ORAL at 16:03

## 2017-03-29 RX ADMIN — FERROUS SULFATE TAB 325 MG (65 MG ELEMENTAL FE) SCH MG: 325 (65 FE) TAB at 08:26

## 2017-03-29 RX ADMIN — LABETALOL HCL SCH MG: 200 TABLET, FILM COATED ORAL at 20:45

## 2017-03-29 RX ADMIN — VENLAFAXINE HYDROCHLORIDE SCH MG: 75 CAPSULE, EXTENDED RELEASE ORAL at 08:26

## 2017-03-29 NOTE — IPN
DATE:  03/29/2017

 

I was notified that patient during the evening had claimed her pants were wet 
but

they were not.  She was standing in the hallway in the middle of the night and

seemed according to reports to be disoriented.  There was no further

documentation on that.  Her thoughts continue slow.  She shrugs her shoulders

when asked how she is feeling.  She states she wants to go home.  She states she

wants to see her daughter.  She states she still has "medical problems" but does

not explain what they are.  She states she only slept on and off.  She states 
she

had a "bladder bust".  We have been attempting to get a urinalysis but have been

unable to.  



Patient still continues more conversant than previously.  Her speech

was only slightly improved in rate and volume with less poverty of thought.  We

were not able to determine any psychotic thoughts on examination.  Her judgment

and insight are poor.  She is fully oriented.  Her remote and recent memory are

intact.  She showed no disturbances of language and had a full fund of 
knowledge.

Her mood is only slightly improved.  Her affect is only slightly brighter.

 

MEDICATIONS:  Include:

- 2 mg of Abilify at bedtime as an attempt to get her antidepressant to work

better.  She is on 225 mg of venlafaxine.

 

DIAGNOSIS:

Major depression with psychotic features.

MTDD

## 2017-03-30 VITALS — DIASTOLIC BLOOD PRESSURE: 66 MMHG | SYSTOLIC BLOOD PRESSURE: 134 MMHG

## 2017-03-30 VITALS — DIASTOLIC BLOOD PRESSURE: 65 MMHG | SYSTOLIC BLOOD PRESSURE: 119 MMHG

## 2017-03-30 RX ADMIN — LISINOPRIL SCH MG: 5 TABLET ORAL at 09:36

## 2017-03-30 RX ADMIN — FERROUS SULFATE TAB 325 MG (65 MG ELEMENTAL FE) SCH MG: 325 (65 FE) TAB at 09:35

## 2017-03-30 RX ADMIN — METFORMIN HYDROCHLORIDE SCH MG: 500 TABLET, EXTENDED RELEASE ORAL at 18:54

## 2017-03-30 RX ADMIN — LABETALOL HCL SCH MG: 200 TABLET, FILM COATED ORAL at 09:36

## 2017-03-30 RX ADMIN — ROSUVASTATIN CALCIUM SCH MG: 10 TABLET, FILM COATED ORAL at 20:18

## 2017-03-30 RX ADMIN — OXYCODONE HYDROCHLORIDE AND ACETAMINOPHEN SCH MG: 500 TABLET ORAL at 09:36

## 2017-03-30 RX ADMIN — OXYCODONE HYDROCHLORIDE AND ACETAMINOPHEN SCH MG: 500 TABLET ORAL at 16:38

## 2017-03-30 RX ADMIN — OXYCODONE HYDROCHLORIDE AND ACETAMINOPHEN SCH MG: 500 TABLET ORAL at 20:24

## 2017-03-30 RX ADMIN — PANTOPRAZOLE SODIUM SCH MG: 40 TABLET, DELAYED RELEASE ORAL at 09:36

## 2017-03-30 RX ADMIN — METFORMIN HYDROCHLORIDE SCH MG: 500 TABLET, EXTENDED RELEASE ORAL at 09:36

## 2017-03-30 RX ADMIN — DOCUSATE SODIUM SCH MG: 100 CAPSULE, LIQUID FILLED ORAL at 09:35

## 2017-03-30 RX ADMIN — VENLAFAXINE HYDROCHLORIDE SCH MG: 75 CAPSULE, EXTENDED RELEASE ORAL at 09:36

## 2017-03-30 RX ADMIN — ALLOPURINOL SCH MG: 300 TABLET ORAL at 09:36

## 2017-03-30 RX ADMIN — LABETALOL HCL SCH MG: 200 TABLET, FILM COATED ORAL at 20:20

## 2017-03-30 RX ADMIN — MONTELUKAST SODIUM SCH MG: 10 TABLET, FILM COATED ORAL at 20:21

## 2017-03-30 RX ADMIN — LEVOTHYROXINE SODIUM SCH MG: 25 TABLET ORAL at 05:54

## 2017-03-30 RX ADMIN — DOCUSATE SODIUM SCH MG: 100 CAPSULE, LIQUID FILLED ORAL at 20:17

## 2017-03-30 NOTE — IPN
DATE:   03/30/2017

 

I met with the patient and her daughter this morning.  She was eating well and

more conversant.  Her affect was slightly brighter, and she was able to state

that she needed to continue eating well and continue to take her medication at

home. There were no reported issues of disorientation or psychotic thoughts

during the night as in previous where she was found in the hallway and seemed,

according to notes, to be disoriented.

 

MENTAL STATUS EXAMINATION:  The patient continues more conversant than previous.

Speech is improved in rate in volume.  She continues to have less poverty of

thought.  No psychotic thoughts noted on examination.  Her judgment and insight

are improving.  She is fully oriented.  Remote and recent memory are intact.  No

disturbances of language with a full fund of knowledge.  Her mood is slightly

improving.  Her affect is slightly brighter. 





 No change in medication at this

time.  225 mg of venlafaxine and 2 mg of Abilify at night.

 

DIAGNOSIS:   Major depression with psychotic features.

 

PLAN:  Continue to observe and treat medically.

EMIL

## 2017-03-31 VITALS — SYSTOLIC BLOOD PRESSURE: 122 MMHG | DIASTOLIC BLOOD PRESSURE: 60 MMHG

## 2017-03-31 VITALS — DIASTOLIC BLOOD PRESSURE: 80 MMHG | SYSTOLIC BLOOD PRESSURE: 160 MMHG

## 2017-03-31 RX ADMIN — LISINOPRIL SCH MG: 5 TABLET ORAL at 08:19

## 2017-03-31 RX ADMIN — ALLOPURINOL SCH MG: 300 TABLET ORAL at 08:19

## 2017-03-31 RX ADMIN — METFORMIN HYDROCHLORIDE SCH MG: 500 TABLET, EXTENDED RELEASE ORAL at 08:20

## 2017-03-31 RX ADMIN — DOCUSATE SODIUM SCH MG: 100 CAPSULE, LIQUID FILLED ORAL at 08:19

## 2017-03-31 RX ADMIN — LABETALOL HCL SCH MG: 200 TABLET, FILM COATED ORAL at 20:34

## 2017-03-31 RX ADMIN — ROSUVASTATIN CALCIUM SCH MG: 10 TABLET, FILM COATED ORAL at 20:34

## 2017-03-31 RX ADMIN — VENLAFAXINE HYDROCHLORIDE SCH MG: 75 CAPSULE, EXTENDED RELEASE ORAL at 08:19

## 2017-03-31 RX ADMIN — PANTOPRAZOLE SODIUM SCH MG: 40 TABLET, DELAYED RELEASE ORAL at 08:18

## 2017-03-31 RX ADMIN — OXYCODONE HYDROCHLORIDE AND ACETAMINOPHEN SCH MG: 500 TABLET ORAL at 20:34

## 2017-03-31 RX ADMIN — LABETALOL HCL SCH MG: 200 TABLET, FILM COATED ORAL at 08:19

## 2017-03-31 RX ADMIN — OXYCODONE HYDROCHLORIDE AND ACETAMINOPHEN SCH MG: 500 TABLET ORAL at 15:57

## 2017-03-31 RX ADMIN — MONTELUKAST SODIUM SCH MG: 10 TABLET, FILM COATED ORAL at 20:34

## 2017-03-31 RX ADMIN — OXYCODONE HYDROCHLORIDE AND ACETAMINOPHEN SCH MG: 500 TABLET ORAL at 08:19

## 2017-03-31 RX ADMIN — FERROUS SULFATE TAB 325 MG (65 MG ELEMENTAL FE) SCH MG: 325 (65 FE) TAB at 08:18

## 2017-03-31 RX ADMIN — LEVOTHYROXINE SODIUM SCH MG: 25 TABLET ORAL at 06:00

## 2017-03-31 RX ADMIN — DOCUSATE SODIUM SCH MG: 100 CAPSULE, LIQUID FILLED ORAL at 20:34

## 2017-03-31 RX ADMIN — METFORMIN HYDROCHLORIDE SCH MG: 500 TABLET, EXTENDED RELEASE ORAL at 17:15

## 2017-03-31 NOTE — IPN
DATE:  03/31/2017

 

I met with the patient this morning.  Mood did not seem significantly improved.

Affect was brighter.  She continued to still be a bit more conversant than she

was on admission.  She is not reporting any significant increase in mood.  I

presently have her on 225 of venlafaxine and 2 mg of Abilify at night.  I will

add 37.5 mg to her medication at this time.  We will be watching her for any

possible vital sign changes.  She continues on 225 plus 37.5 mg of venlafaxine

and Abilify 2 mg at bedtime. 



 No reports of disorientation as was previously

reported, though not seen by myself.  Speech is slow.  Poverty of thought.  No

loose associations. No abnormal psychotic thoughts.  Judgment and insight are

fair.  Orientation in three spheres is present.  No particular difficulties of

recent and remote memory.  Attention and concentration are intact.  No

disturbances of language.  Full fund of knowledge.  Mood is low.  Affect is 
flat.

 

 

DIAGNOSIS:

Major depressive illness.

 

Will encourage patient to be out of her room.

EMIL

## 2017-04-01 VITALS — DIASTOLIC BLOOD PRESSURE: 80 MMHG | SYSTOLIC BLOOD PRESSURE: 150 MMHG

## 2017-04-01 VITALS — DIASTOLIC BLOOD PRESSURE: 82 MMHG | SYSTOLIC BLOOD PRESSURE: 140 MMHG

## 2017-04-01 LAB
ANION GAP SERPL CALC-SCNC: 9 MEQ/L (ref 8–16)
BUN SERPL-MCNC: 9 MG/DL (ref 7–18)
CALCIUM SERPL-MCNC: 8.8 MG/DL (ref 8.8–10.2)
CHLORIDE SERPL-SCNC: 95 MEQ/L (ref 98–107)
CO2 SERPL-SCNC: 27 MEQ/L (ref 21–32)
CREAT SERPL-MCNC: 0.52 MG/DL (ref 0.55–1.02)
GFR SERPL CREATININE-BSD FRML MDRD: > 60 ML/MIN/{1.73_M2} (ref 32–?)
GLUCOSE SERPL-MCNC: 103 MG/DL (ref 83–110)
POTASSIUM SERPL-SCNC: 4.1 MEQ/L (ref 3.5–5.1)
SODIUM SERPL-SCNC: 131 MEQ/L (ref 136–145)

## 2017-04-01 RX ADMIN — PANTOPRAZOLE SODIUM SCH MG: 40 TABLET, DELAYED RELEASE ORAL at 09:43

## 2017-04-01 RX ADMIN — LEVOTHYROXINE SODIUM SCH MG: 25 TABLET ORAL at 06:02

## 2017-04-01 RX ADMIN — METFORMIN HYDROCHLORIDE SCH MG: 500 TABLET, EXTENDED RELEASE ORAL at 17:05

## 2017-04-01 RX ADMIN — METFORMIN HYDROCHLORIDE SCH MG: 500 TABLET, EXTENDED RELEASE ORAL at 09:44

## 2017-04-01 RX ADMIN — LISINOPRIL SCH MG: 5 TABLET ORAL at 09:45

## 2017-04-01 RX ADMIN — ALLOPURINOL SCH MG: 300 TABLET ORAL at 09:44

## 2017-04-01 RX ADMIN — VENLAFAXINE HYDROCHLORIDE SCH MG: 75 CAPSULE, EXTENDED RELEASE ORAL at 09:43

## 2017-04-01 RX ADMIN — OXYCODONE HYDROCHLORIDE AND ACETAMINOPHEN SCH MG: 500 TABLET ORAL at 15:36

## 2017-04-01 RX ADMIN — LABETALOL HCL SCH MG: 200 TABLET, FILM COATED ORAL at 09:44

## 2017-04-01 RX ADMIN — DOCUSATE SODIUM SCH MG: 100 CAPSULE, LIQUID FILLED ORAL at 20:16

## 2017-04-01 RX ADMIN — OXYCODONE HYDROCHLORIDE AND ACETAMINOPHEN SCH MG: 500 TABLET ORAL at 09:44

## 2017-04-01 RX ADMIN — FERROUS SULFATE TAB 325 MG (65 MG ELEMENTAL FE) SCH MG: 325 (65 FE) TAB at 09:44

## 2017-04-01 RX ADMIN — VENLAFAXINE HYDROCHLORIDE SCH MG: 37.5 CAPSULE, EXTENDED RELEASE ORAL at 09:44

## 2017-04-01 RX ADMIN — OXYCODONE HYDROCHLORIDE AND ACETAMINOPHEN SCH MG: 500 TABLET ORAL at 20:18

## 2017-04-01 RX ADMIN — ROSUVASTATIN CALCIUM SCH MG: 10 TABLET, FILM COATED ORAL at 20:18

## 2017-04-01 RX ADMIN — MONTELUKAST SODIUM SCH MG: 10 TABLET, FILM COATED ORAL at 20:17

## 2017-04-01 RX ADMIN — DOCUSATE SODIUM SCH MG: 100 CAPSULE, LIQUID FILLED ORAL at 09:44

## 2017-04-01 RX ADMIN — LABETALOL HCL SCH MG: 200 TABLET, FILM COATED ORAL at 20:17

## 2017-04-02 VITALS — DIASTOLIC BLOOD PRESSURE: 66 MMHG | SYSTOLIC BLOOD PRESSURE: 141 MMHG

## 2017-04-02 VITALS — DIASTOLIC BLOOD PRESSURE: 81 MMHG | SYSTOLIC BLOOD PRESSURE: 135 MMHG

## 2017-04-02 VITALS — SYSTOLIC BLOOD PRESSURE: 114 MMHG | DIASTOLIC BLOOD PRESSURE: 70 MMHG

## 2017-04-02 RX ADMIN — OXYCODONE HYDROCHLORIDE AND ACETAMINOPHEN SCH MG: 500 TABLET ORAL at 09:21

## 2017-04-02 RX ADMIN — DOCUSATE SODIUM SCH MG: 100 CAPSULE, LIQUID FILLED ORAL at 20:20

## 2017-04-02 RX ADMIN — MONTELUKAST SODIUM SCH MG: 10 TABLET, FILM COATED ORAL at 20:21

## 2017-04-02 RX ADMIN — VENLAFAXINE HYDROCHLORIDE SCH MG: 37.5 CAPSULE, EXTENDED RELEASE ORAL at 09:20

## 2017-04-02 RX ADMIN — LABETALOL HCL SCH MG: 200 TABLET, FILM COATED ORAL at 09:20

## 2017-04-02 RX ADMIN — OXYCODONE HYDROCHLORIDE AND ACETAMINOPHEN SCH MG: 500 TABLET ORAL at 20:21

## 2017-04-02 RX ADMIN — ALLOPURINOL SCH MG: 300 TABLET ORAL at 09:21

## 2017-04-02 RX ADMIN — OXYCODONE HYDROCHLORIDE AND ACETAMINOPHEN SCH MG: 500 TABLET ORAL at 14:52

## 2017-04-02 RX ADMIN — LABETALOL HCL SCH MG: 200 TABLET, FILM COATED ORAL at 20:20

## 2017-04-02 RX ADMIN — DOCUSATE SODIUM SCH MG: 100 CAPSULE, LIQUID FILLED ORAL at 09:21

## 2017-04-02 RX ADMIN — ROSUVASTATIN CALCIUM SCH MG: 10 TABLET, FILM COATED ORAL at 20:21

## 2017-04-02 RX ADMIN — VENLAFAXINE HYDROCHLORIDE SCH MG: 75 CAPSULE, EXTENDED RELEASE ORAL at 09:20

## 2017-04-02 RX ADMIN — METFORMIN HYDROCHLORIDE SCH MG: 500 TABLET, EXTENDED RELEASE ORAL at 09:20

## 2017-04-02 RX ADMIN — PANTOPRAZOLE SODIUM SCH MG: 40 TABLET, DELAYED RELEASE ORAL at 09:21

## 2017-04-02 RX ADMIN — LISINOPRIL SCH MG: 5 TABLET ORAL at 09:21

## 2017-04-02 RX ADMIN — LEVOTHYROXINE SODIUM SCH MG: 25 TABLET ORAL at 06:03

## 2017-04-02 RX ADMIN — FERROUS SULFATE TAB 325 MG (65 MG ELEMENTAL FE) SCH MG: 325 (65 FE) TAB at 09:21

## 2017-04-02 RX ADMIN — METFORMIN HYDROCHLORIDE SCH MG: 500 TABLET, EXTENDED RELEASE ORAL at 17:15

## 2017-04-03 VITALS — SYSTOLIC BLOOD PRESSURE: 110 MMHG | DIASTOLIC BLOOD PRESSURE: 70 MMHG

## 2017-04-03 VITALS — SYSTOLIC BLOOD PRESSURE: 144 MMHG | DIASTOLIC BLOOD PRESSURE: 63 MMHG

## 2017-04-03 LAB
ANION GAP SERPL CALC-SCNC: 5 MEQ/L (ref 8–16)
BUN SERPL-MCNC: 12 MG/DL (ref 7–18)
CALCIUM SERPL-MCNC: 9 MG/DL (ref 8.8–10.2)
CHLORIDE SERPL-SCNC: 95 MEQ/L (ref 98–107)
CO2 SERPL-SCNC: 30 MEQ/L (ref 21–32)
CREAT SERPL-MCNC: 0.57 MG/DL (ref 0.55–1.02)
GFR SERPL CREATININE-BSD FRML MDRD: > 60 ML/MIN/{1.73_M2} (ref 32–?)
GLUCOSE SERPL-MCNC: 99 MG/DL (ref 83–110)
MAGNESIUM SERPL-MCNC: 1.7 MG/DL (ref 1.8–2.4)
POTASSIUM SERPL-SCNC: 4.2 MEQ/L (ref 3.5–5.1)
SODIUM SERPL-SCNC: 130 MEQ/L (ref 136–145)
URATE SERPL-MCNC: 2 MG/DL (ref 2.6–6)

## 2017-04-03 RX ADMIN — METFORMIN HYDROCHLORIDE SCH MG: 500 TABLET, EXTENDED RELEASE ORAL at 08:56

## 2017-04-03 RX ADMIN — LABETALOL HCL SCH MG: 200 TABLET, FILM COATED ORAL at 08:56

## 2017-04-03 RX ADMIN — METFORMIN HYDROCHLORIDE SCH MG: 500 TABLET, EXTENDED RELEASE ORAL at 17:25

## 2017-04-03 RX ADMIN — PANTOPRAZOLE SODIUM SCH MG: 40 TABLET, DELAYED RELEASE ORAL at 08:56

## 2017-04-03 RX ADMIN — LABETALOL HCL SCH MG: 200 TABLET, FILM COATED ORAL at 20:18

## 2017-04-03 RX ADMIN — FERROUS SULFATE TAB 325 MG (65 MG ELEMENTAL FE) SCH MG: 325 (65 FE) TAB at 08:56

## 2017-04-03 RX ADMIN — VENLAFAXINE HYDROCHLORIDE SCH MG: 37.5 CAPSULE, EXTENDED RELEASE ORAL at 08:55

## 2017-04-03 RX ADMIN — OXYCODONE HYDROCHLORIDE AND ACETAMINOPHEN SCH MG: 500 TABLET ORAL at 20:18

## 2017-04-03 RX ADMIN — VENLAFAXINE HYDROCHLORIDE SCH MG: 75 CAPSULE, EXTENDED RELEASE ORAL at 08:55

## 2017-04-03 RX ADMIN — ROSUVASTATIN CALCIUM SCH MG: 10 TABLET, FILM COATED ORAL at 20:18

## 2017-04-03 RX ADMIN — ALLOPURINOL SCH MG: 300 TABLET ORAL at 08:56

## 2017-04-03 RX ADMIN — DOCUSATE SODIUM SCH MG: 100 CAPSULE, LIQUID FILLED ORAL at 20:18

## 2017-04-03 RX ADMIN — OXYCODONE HYDROCHLORIDE AND ACETAMINOPHEN SCH MG: 500 TABLET ORAL at 08:55

## 2017-04-03 RX ADMIN — LISINOPRIL SCH MG: 5 TABLET ORAL at 08:56

## 2017-04-03 RX ADMIN — OXYCODONE HYDROCHLORIDE AND ACETAMINOPHEN SCH MG: 500 TABLET ORAL at 16:02

## 2017-04-03 RX ADMIN — LEVOTHYROXINE SODIUM SCH MG: 25 TABLET ORAL at 06:43

## 2017-04-03 RX ADMIN — DOCUSATE SODIUM SCH MG: 100 CAPSULE, LIQUID FILLED ORAL at 08:55

## 2017-04-03 RX ADMIN — MONTELUKAST SODIUM SCH MG: 10 TABLET, FILM COATED ORAL at 20:18

## 2017-04-03 NOTE — IPNPDOC
Subjective


Date Seen


The patient was seen on 4/3/17.





Subjective


Chief Complaint/HPI


The patient is a 81-year-old female admitted with a reason for visit of Major 

Depressive Disorder.


Events since last encounter


Pt with no new complaints. 


Noted to have chronic hyponatremia.





Objective


Physical Examination


General Exam:  Positive: Alert


Eye Exam:  Positive: PERRLA


ENT Exam:  Positive: Atraumatic, Mucous membr. moist/pink, Pharynx Normal


Neck Exam:  Positive: Supple, 


   Negative: JVD, thyromegaly


Chest Exam:  Positive: Clear to auscultation, Normal air movement


Heart Exam:  Positive: Normal S1, Normal S2, Rate Normal, Regular Rhythm, 


   Negative: Murmurs, Rubs


Abdomen Exam:  Positive: Normal bowel sounds, Soft, Tenderness (very slight TTP 

LLQ)


Skin Exam:  Positive: Nl turgor and temperature


Neuro Exam:  Positive: Other (ambulates with walker)





Assessment /Plan


Problems





(1) Hyponatremia


Status:  Chronic


Problem Text:  


* Patient's usual trend 130 to 135.


* TSH 3/22/17 WNL. 


* Further labs requested including urine osmolality, urine sodium, creatinine. 

A.m. cortisol.


* Magnesium, uric acid level.


* Recheck BMP in a.m.





(2) CAD (coronary artery disease)


Status:  Chronic


Response to Treatment:  Stable


Problem Text:  


* Continue labetalol with hold parameters.





(3) Hyperlipidemia


Status:  Chronic


Response to Treatment:  Stable


Problem Text:  


* Continue Crestor





(4) Hypothyroidism


Status:  Chronic


Response to Treatment:  Stable


Problem Text:  


* Continue Synthroid





(5) HTN (hypertension)


Status:  Chronic


Response to Treatment:  Stable


Problem Text:  


* Lisinopril with hold parameters





(6) Diabetes mellitus


Status:  Chronic


Response to Treatment:  Stable


Problem Text:  


* Metformin


* Blood sugar 108 this a.m.





(7) Gout


Status:  Chronic


Response to Treatment:  Stable


Problem Text:  


* Allopurinol


* Uric acid level pending.





(8) Anemia


Status:  Acute


Problem Text:  


* Iron supplement








Plan/VTE


VTE Prophylaxis Ordered?:  No (Ambulatory)





VS, I&O, 24H, Fishbone


Vital Signs/I&O





 Vital Signs








  Date Time  Temp Pulse Resp B/P Pulse Ox O2 Delivery O2 Flow Rate FiO2


 


4/3/17 08:56    126/63    


 


4/3/17 08:56  63      


 


4/3/17 06:42 97.8  16     


 


3/30/17 06:18      Room Air  


 


3/28/17 10:34     16   











Laboratory Data


24H LABS


 Laboratory Tests 2


4/2/17 17:19: Bedside Glucose (Misc Panel) 126H


4/3/17 06:27: 


Anion Gap 5L, Blood Urea Nitrogen 12, Creatinine 0.57, Sodium Level 130L, 

Potassium Level 4.2, Chloride Level 95L, Carbon Dioxide Level 30, Calcium Level 

9.0, Glomerular Filtration Rate > 60.0


CBC/BMP


 Laboratory Tests


4/3/17 06:27








Calcium Level 9.0








Sylvia De Santiago Apr 3, 2017 11:34

## 2017-04-04 VITALS — DIASTOLIC BLOOD PRESSURE: 56 MMHG | SYSTOLIC BLOOD PRESSURE: 105 MMHG

## 2017-04-04 VITALS — DIASTOLIC BLOOD PRESSURE: 61 MMHG | SYSTOLIC BLOOD PRESSURE: 117 MMHG

## 2017-04-04 RX ADMIN — OXYCODONE HYDROCHLORIDE AND ACETAMINOPHEN SCH MG: 500 TABLET ORAL at 09:07

## 2017-04-04 RX ADMIN — METFORMIN HYDROCHLORIDE SCH MG: 500 TABLET, EXTENDED RELEASE ORAL at 09:07

## 2017-04-04 RX ADMIN — LABETALOL HCL SCH MG: 200 TABLET, FILM COATED ORAL at 20:00

## 2017-04-04 RX ADMIN — METFORMIN HYDROCHLORIDE SCH MG: 500 TABLET, EXTENDED RELEASE ORAL at 17:09

## 2017-04-04 RX ADMIN — VENLAFAXINE HYDROCHLORIDE SCH MG: 75 CAPSULE, EXTENDED RELEASE ORAL at 09:06

## 2017-04-04 RX ADMIN — FERROUS SULFATE TAB 325 MG (65 MG ELEMENTAL FE) SCH MG: 325 (65 FE) TAB at 09:07

## 2017-04-04 RX ADMIN — LISINOPRIL SCH MG: 5 TABLET ORAL at 09:17

## 2017-04-04 RX ADMIN — MONTELUKAST SODIUM SCH MG: 10 TABLET, FILM COATED ORAL at 20:00

## 2017-04-04 RX ADMIN — DOCUSATE SODIUM SCH MG: 100 CAPSULE, LIQUID FILLED ORAL at 09:06

## 2017-04-04 RX ADMIN — VENLAFAXINE HYDROCHLORIDE SCH MG: 37.5 CAPSULE, EXTENDED RELEASE ORAL at 09:06

## 2017-04-04 RX ADMIN — DOCUSATE SODIUM SCH MG: 100 CAPSULE, LIQUID FILLED ORAL at 20:00

## 2017-04-04 RX ADMIN — ALLOPURINOL SCH MG: 300 TABLET ORAL at 09:08

## 2017-04-04 RX ADMIN — OXYCODONE HYDROCHLORIDE AND ACETAMINOPHEN SCH MG: 500 TABLET ORAL at 16:16

## 2017-04-04 RX ADMIN — ROSUVASTATIN CALCIUM SCH MG: 10 TABLET, FILM COATED ORAL at 20:01

## 2017-04-04 RX ADMIN — LEVOTHYROXINE SODIUM SCH MG: 25 TABLET ORAL at 06:13

## 2017-04-04 RX ADMIN — LABETALOL HCL SCH MG: 200 TABLET, FILM COATED ORAL at 09:17

## 2017-04-04 RX ADMIN — OXYCODONE HYDROCHLORIDE AND ACETAMINOPHEN SCH MG: 500 TABLET ORAL at 20:01

## 2017-04-04 RX ADMIN — PANTOPRAZOLE SODIUM SCH MG: 40 TABLET, DELAYED RELEASE ORAL at 09:06

## 2017-04-04 NOTE — IPN
DATE:  04/04/2017

 

Cindy Mena continues to lay in bed and not participate in activities.  She

is eating in her room.  She states she seems somewhat better, but then backs off

that statement.  Two PC has been begun.  I spoke with her daughter to explain

that I have added Wellbutrin 75 mg to her antidepressant regimen.  I may have to

consider changing her antidepressant.  I am trying to be careful with the dosage

due to her age.

 

MENTAL STATUS EXAMINATION:  Her speech was limited, but normal in articulation

and volume.  She is demonstrating a poverty of thought.  No loose associations.

No abnormal or psychotic thoughts.  Judgment and insight are fair.  The patient

is fully oriented.  Attention and concentration continue difficult to measure at

this point.  Fund of knowledge is fair.  Mood still low.  Affect is flat.

 

DIAGNOSIS:  Major depression with psychotic features.

 

PLAN:  See if the addition of Wellbutrin helps this patient with major depression

or may consider changing antidepressants.

## 2017-04-05 VITALS — DIASTOLIC BLOOD PRESSURE: 60 MMHG | SYSTOLIC BLOOD PRESSURE: 126 MMHG

## 2017-04-05 VITALS — DIASTOLIC BLOOD PRESSURE: 57 MMHG | SYSTOLIC BLOOD PRESSURE: 129 MMHG

## 2017-04-05 RX ADMIN — DOCUSATE SODIUM SCH MG: 100 CAPSULE, LIQUID FILLED ORAL at 09:24

## 2017-04-05 RX ADMIN — FERROUS SULFATE TAB 325 MG (65 MG ELEMENTAL FE) SCH MG: 325 (65 FE) TAB at 09:25

## 2017-04-05 RX ADMIN — LISINOPRIL SCH MG: 5 TABLET ORAL at 09:35

## 2017-04-05 RX ADMIN — OXYCODONE HYDROCHLORIDE AND ACETAMINOPHEN SCH MG: 500 TABLET ORAL at 16:09

## 2017-04-05 RX ADMIN — LEVOTHYROXINE SODIUM SCH MG: 25 TABLET ORAL at 05:55

## 2017-04-05 RX ADMIN — LABETALOL HCL SCH MG: 200 TABLET, FILM COATED ORAL at 09:34

## 2017-04-05 RX ADMIN — OXYCODONE HYDROCHLORIDE AND ACETAMINOPHEN SCH MG: 500 TABLET ORAL at 09:25

## 2017-04-05 RX ADMIN — MONTELUKAST SODIUM SCH MG: 10 TABLET, FILM COATED ORAL at 20:23

## 2017-04-05 RX ADMIN — METFORMIN HYDROCHLORIDE SCH MG: 500 TABLET, EXTENDED RELEASE ORAL at 09:25

## 2017-04-05 RX ADMIN — LABETALOL HCL SCH MG: 200 TABLET, FILM COATED ORAL at 20:26

## 2017-04-05 RX ADMIN — PANTOPRAZOLE SODIUM SCH MG: 40 TABLET, DELAYED RELEASE ORAL at 09:25

## 2017-04-05 RX ADMIN — METFORMIN HYDROCHLORIDE SCH MG: 500 TABLET, EXTENDED RELEASE ORAL at 17:09

## 2017-04-05 RX ADMIN — OXYCODONE HYDROCHLORIDE AND ACETAMINOPHEN SCH MG: 500 TABLET ORAL at 20:25

## 2017-04-05 RX ADMIN — DOCUSATE SODIUM SCH MG: 100 CAPSULE, LIQUID FILLED ORAL at 20:25

## 2017-04-05 RX ADMIN — ALLOPURINOL SCH MG: 300 TABLET ORAL at 09:25

## 2017-04-05 RX ADMIN — ROSUVASTATIN CALCIUM SCH MG: 10 TABLET, FILM COATED ORAL at 20:25

## 2017-04-05 NOTE — IPN
DATE:   04/05/2017

 

Ms. Mena continues profoundly depressed.  I presently have her on 75 mg of

Wellbutrin and 300 mg of Effexor.  Due to her age, I am attempting to be careful

with the medication doses, but her depression seems somewhat resistant and we

have initiated  a 2PC should she need to go [facility] and require ECT.  I may consider discontinuing her 
venlafaxine

and trying her on Cymbalta next.  I have also increased her Abilify to 3 mg by

mouth at night.  The patient continues to lie in bed.  Poverty of speech.  No

disturbances of thought process.  No loose associations or psychotic thoughts.

Judgment and insight are, at present, as best as can be determined.  She is 
fully

oriented.  No difficulties of recent and remote memory.  Attention and

concentration are hard to determine at this time.  She seems to have a full fund

of knowledge.  Mood is low.  Affect is flat.  The patient continues isolative.

We will continue to observe and make decision of change of medication if

necessary.

DIAGNOSIS:  Major depressive disorder.

MTDD

## 2017-04-06 VITALS — DIASTOLIC BLOOD PRESSURE: 74 MMHG | SYSTOLIC BLOOD PRESSURE: 159 MMHG

## 2017-04-06 VITALS — SYSTOLIC BLOOD PRESSURE: 126 MMHG | DIASTOLIC BLOOD PRESSURE: 65 MMHG

## 2017-04-06 VITALS — DIASTOLIC BLOOD PRESSURE: 72 MMHG | SYSTOLIC BLOOD PRESSURE: 149 MMHG

## 2017-04-06 RX ADMIN — ALLOPURINOL SCH MG: 300 TABLET ORAL at 09:41

## 2017-04-06 RX ADMIN — ROSUVASTATIN CALCIUM SCH MG: 10 TABLET, FILM COATED ORAL at 20:05

## 2017-04-06 RX ADMIN — METFORMIN HYDROCHLORIDE SCH MG: 500 TABLET, EXTENDED RELEASE ORAL at 09:41

## 2017-04-06 RX ADMIN — DOCUSATE SODIUM SCH MG: 100 CAPSULE, LIQUID FILLED ORAL at 09:40

## 2017-04-06 RX ADMIN — METFORMIN HYDROCHLORIDE SCH MG: 500 TABLET, EXTENDED RELEASE ORAL at 17:16

## 2017-04-06 RX ADMIN — LABETALOL HCL SCH MG: 200 TABLET, FILM COATED ORAL at 20:04

## 2017-04-06 RX ADMIN — DOCUSATE SODIUM SCH MG: 100 CAPSULE, LIQUID FILLED ORAL at 20:05

## 2017-04-06 RX ADMIN — LABETALOL HCL SCH MG: 200 TABLET, FILM COATED ORAL at 09:41

## 2017-04-06 RX ADMIN — OXYCODONE HYDROCHLORIDE AND ACETAMINOPHEN SCH MG: 500 TABLET ORAL at 09:41

## 2017-04-06 RX ADMIN — PANTOPRAZOLE SODIUM SCH MG: 40 TABLET, DELAYED RELEASE ORAL at 09:40

## 2017-04-06 RX ADMIN — OXYCODONE HYDROCHLORIDE AND ACETAMINOPHEN SCH MG: 500 TABLET ORAL at 20:03

## 2017-04-06 RX ADMIN — OXYCODONE HYDROCHLORIDE AND ACETAMINOPHEN SCH MG: 500 TABLET ORAL at 15:40

## 2017-04-06 RX ADMIN — LEVOTHYROXINE SODIUM SCH MG: 25 TABLET ORAL at 05:42

## 2017-04-06 RX ADMIN — MONTELUKAST SODIUM SCH MG: 10 TABLET, FILM COATED ORAL at 20:39

## 2017-04-06 RX ADMIN — FERROUS SULFATE TAB 325 MG (65 MG ELEMENTAL FE) SCH MG: 325 (65 FE) TAB at 09:40

## 2017-04-06 RX ADMIN — LISINOPRIL SCH MG: 5 TABLET ORAL at 09:40

## 2017-04-06 NOTE — IPN
DATE:  04/06/2017

 

Cindy Mena continues to be found by me in her room.  It is possible she is

frightened to be on the unit, but nonetheless because of my concerns about her

physical health and the possibility of deep venous thrombosis (DVT) with her

constantly in bed, I have requested that the staff walk with her every hour or so

on the unit.  In addition, I think her being out of bed and among people cannot

be harmful.  Nonetheless, I have Effexor is ineffective and unwilling at this

point to increase the dosage anymore.  I am decreasing her venlafaxine to 150 mg

every day and starting her on Cymbalta 20 mg, which I will increase.  She

continues on Abilify and Wellbutrin.  I have asked that her vital signs be done

more frequently and I have ordered such.

 

Diagnosis is major depression.

 

Plan is to revamp her antidepressant regimen.  She has had a 2BC process begun

should she not improve on antidepressants.

## 2017-04-07 VITALS — SYSTOLIC BLOOD PRESSURE: 138 MMHG | DIASTOLIC BLOOD PRESSURE: 65 MMHG

## 2017-04-07 VITALS — SYSTOLIC BLOOD PRESSURE: 133 MMHG | DIASTOLIC BLOOD PRESSURE: 63 MMHG

## 2017-04-07 RX ADMIN — METFORMIN HYDROCHLORIDE SCH MG: 500 TABLET, EXTENDED RELEASE ORAL at 17:26

## 2017-04-07 RX ADMIN — LEVOTHYROXINE SODIUM SCH MG: 25 TABLET ORAL at 05:15

## 2017-04-07 RX ADMIN — FERROUS SULFATE TAB 325 MG (65 MG ELEMENTAL FE) SCH MG: 325 (65 FE) TAB at 09:35

## 2017-04-07 RX ADMIN — ROSUVASTATIN CALCIUM SCH MG: 10 TABLET, FILM COATED ORAL at 20:41

## 2017-04-07 RX ADMIN — VENLAFAXINE HYDROCHLORIDE SCH MG: 75 CAPSULE, EXTENDED RELEASE ORAL at 09:34

## 2017-04-07 RX ADMIN — DULOXETINE HYDROCHLORIDE SCH MG: 30 CAPSULE, DELAYED RELEASE ORAL at 09:33

## 2017-04-07 RX ADMIN — OXYCODONE HYDROCHLORIDE AND ACETAMINOPHEN SCH MG: 500 TABLET ORAL at 20:41

## 2017-04-07 RX ADMIN — OXYCODONE HYDROCHLORIDE AND ACETAMINOPHEN SCH MG: 500 TABLET ORAL at 09:33

## 2017-04-07 RX ADMIN — PANTOPRAZOLE SODIUM SCH MG: 40 TABLET, DELAYED RELEASE ORAL at 09:33

## 2017-04-07 RX ADMIN — OXYCODONE HYDROCHLORIDE AND ACETAMINOPHEN SCH MG: 500 TABLET ORAL at 15:31

## 2017-04-07 RX ADMIN — DOCUSATE SODIUM SCH MG: 100 CAPSULE, LIQUID FILLED ORAL at 09:33

## 2017-04-07 RX ADMIN — LISINOPRIL SCH MG: 5 TABLET ORAL at 09:34

## 2017-04-07 RX ADMIN — MONTELUKAST SODIUM SCH MG: 10 TABLET, FILM COATED ORAL at 20:40

## 2017-04-07 RX ADMIN — METFORMIN HYDROCHLORIDE SCH MG: 500 TABLET, EXTENDED RELEASE ORAL at 09:33

## 2017-04-07 RX ADMIN — DOCUSATE SODIUM SCH MG: 100 CAPSULE, LIQUID FILLED ORAL at 20:40

## 2017-04-07 RX ADMIN — ALLOPURINOL SCH MG: 300 TABLET ORAL at 09:34

## 2017-04-07 RX ADMIN — LABETALOL HCL SCH MG: 200 TABLET, FILM COATED ORAL at 09:34

## 2017-04-07 RX ADMIN — LABETALOL HCL SCH MG: 200 TABLET, FILM COATED ORAL at 20:41

## 2017-04-07 NOTE — IPN
DATE:  04/07/2017

 

I met with her today.  She was sitting at her desk.  She was more conversant and

smiled some.  We have her now walking with staff so that we can keep her out of

her bedroom for health and psychological reasons.  I am presently changing her

antidepressant from venlafaxine to Cymbalta.  I have dropped her venlafaxine to

75 mg and increased her Cymbalta to 30 and will increase it on Monday to 60.  I

have the staff monitoring her vital signs.  There have been no disturbances of

vital signs.  No significant complaints.  She is also on bupropion 75 mg. Due to

her age, we are trying to keep some of her medications moderated.  The increase

in venlafaxine did not seem effective though she had been on it so many years.

She is still demonstrating a poverty of speech.

 

MENTAL STATUS:  No disturbance of thought process.  No loose associations.  No

abnormal or psychotic thoughts.  Judgment and insight are fair.  Fully oriented.

Recent and remote memory difficult to attain.  Attention and concentration poor.

No disturbances of language.  She has a full fund of knowledge.  Her mood is

improving.  Affect still flat.

## 2017-04-08 VITALS — SYSTOLIC BLOOD PRESSURE: 140 MMHG | DIASTOLIC BLOOD PRESSURE: 62 MMHG

## 2017-04-08 VITALS — DIASTOLIC BLOOD PRESSURE: 60 MMHG | SYSTOLIC BLOOD PRESSURE: 127 MMHG

## 2017-04-08 RX ADMIN — PANTOPRAZOLE SODIUM SCH MG: 40 TABLET, DELAYED RELEASE ORAL at 08:13

## 2017-04-08 RX ADMIN — DOCUSATE SODIUM SCH MG: 100 CAPSULE, LIQUID FILLED ORAL at 20:10

## 2017-04-08 RX ADMIN — VENLAFAXINE HYDROCHLORIDE SCH MG: 75 CAPSULE, EXTENDED RELEASE ORAL at 08:13

## 2017-04-08 RX ADMIN — LABETALOL HCL SCH MG: 200 TABLET, FILM COATED ORAL at 20:14

## 2017-04-08 RX ADMIN — METFORMIN HYDROCHLORIDE SCH MG: 500 TABLET, EXTENDED RELEASE ORAL at 08:13

## 2017-04-08 RX ADMIN — OXYCODONE HYDROCHLORIDE AND ACETAMINOPHEN SCH MG: 500 TABLET ORAL at 08:13

## 2017-04-08 RX ADMIN — DOCUSATE SODIUM SCH MG: 100 CAPSULE, LIQUID FILLED ORAL at 08:13

## 2017-04-08 RX ADMIN — METFORMIN HYDROCHLORIDE SCH MG: 500 TABLET, EXTENDED RELEASE ORAL at 17:15

## 2017-04-08 RX ADMIN — ALLOPURINOL SCH MG: 300 TABLET ORAL at 08:13

## 2017-04-08 RX ADMIN — OXYCODONE HYDROCHLORIDE AND ACETAMINOPHEN SCH MG: 500 TABLET ORAL at 15:35

## 2017-04-08 RX ADMIN — MONTELUKAST SODIUM SCH MG: 10 TABLET, FILM COATED ORAL at 20:10

## 2017-04-08 RX ADMIN — LABETALOL HCL SCH MG: 200 TABLET, FILM COATED ORAL at 08:14

## 2017-04-08 RX ADMIN — DULOXETINE HYDROCHLORIDE SCH MG: 30 CAPSULE, DELAYED RELEASE ORAL at 08:13

## 2017-04-08 RX ADMIN — FERROUS SULFATE TAB 325 MG (65 MG ELEMENTAL FE) SCH MG: 325 (65 FE) TAB at 08:13

## 2017-04-08 RX ADMIN — LEVOTHYROXINE SODIUM SCH MG: 25 TABLET ORAL at 06:00

## 2017-04-08 RX ADMIN — ROSUVASTATIN CALCIUM SCH MG: 10 TABLET, FILM COATED ORAL at 20:11

## 2017-04-08 RX ADMIN — LISINOPRIL SCH MG: 5 TABLET ORAL at 08:14

## 2017-04-08 RX ADMIN — OXYCODONE HYDROCHLORIDE AND ACETAMINOPHEN SCH MG: 500 TABLET ORAL at 20:11

## 2017-04-09 VITALS — DIASTOLIC BLOOD PRESSURE: 72 MMHG | SYSTOLIC BLOOD PRESSURE: 138 MMHG

## 2017-04-09 VITALS — SYSTOLIC BLOOD PRESSURE: 121 MMHG | DIASTOLIC BLOOD PRESSURE: 63 MMHG

## 2017-04-09 RX ADMIN — VENLAFAXINE HYDROCHLORIDE SCH MG: 75 CAPSULE, EXTENDED RELEASE ORAL at 08:24

## 2017-04-09 RX ADMIN — LABETALOL HCL SCH MG: 200 TABLET, FILM COATED ORAL at 20:08

## 2017-04-09 RX ADMIN — FERROUS SULFATE TAB 325 MG (65 MG ELEMENTAL FE) SCH MG: 325 (65 FE) TAB at 08:24

## 2017-04-09 RX ADMIN — PANTOPRAZOLE SODIUM SCH MG: 40 TABLET, DELAYED RELEASE ORAL at 08:24

## 2017-04-09 RX ADMIN — DULOXETINE HYDROCHLORIDE SCH MG: 30 CAPSULE, DELAYED RELEASE ORAL at 08:24

## 2017-04-09 RX ADMIN — OXYCODONE HYDROCHLORIDE AND ACETAMINOPHEN SCH MG: 500 TABLET ORAL at 08:24

## 2017-04-09 RX ADMIN — METFORMIN HYDROCHLORIDE SCH MG: 500 TABLET, EXTENDED RELEASE ORAL at 17:36

## 2017-04-09 RX ADMIN — DOCUSATE SODIUM SCH MG: 100 CAPSULE, LIQUID FILLED ORAL at 20:07

## 2017-04-09 RX ADMIN — OXYCODONE HYDROCHLORIDE AND ACETAMINOPHEN SCH MG: 500 TABLET ORAL at 15:55

## 2017-04-09 RX ADMIN — METFORMIN HYDROCHLORIDE SCH MG: 500 TABLET, EXTENDED RELEASE ORAL at 08:23

## 2017-04-09 RX ADMIN — LISINOPRIL SCH MG: 5 TABLET ORAL at 08:24

## 2017-04-09 RX ADMIN — OXYCODONE HYDROCHLORIDE AND ACETAMINOPHEN SCH MG: 500 TABLET ORAL at 20:08

## 2017-04-09 RX ADMIN — ALLOPURINOL SCH MG: 300 TABLET ORAL at 08:23

## 2017-04-09 RX ADMIN — DOCUSATE SODIUM SCH MG: 100 CAPSULE, LIQUID FILLED ORAL at 08:23

## 2017-04-09 RX ADMIN — LEVOTHYROXINE SODIUM SCH MG: 25 TABLET ORAL at 06:01

## 2017-04-09 RX ADMIN — LABETALOL HCL SCH MG: 200 TABLET, FILM COATED ORAL at 08:24

## 2017-04-09 RX ADMIN — ROSUVASTATIN CALCIUM SCH MG: 10 TABLET, FILM COATED ORAL at 20:07

## 2017-04-09 RX ADMIN — MONTELUKAST SODIUM SCH MG: 10 TABLET, FILM COATED ORAL at 20:08

## 2017-04-10 VITALS — DIASTOLIC BLOOD PRESSURE: 67 MMHG | SYSTOLIC BLOOD PRESSURE: 141 MMHG

## 2017-04-10 VITALS — SYSTOLIC BLOOD PRESSURE: 134 MMHG | DIASTOLIC BLOOD PRESSURE: 64 MMHG

## 2017-04-10 RX ADMIN — LABETALOL HCL SCH MG: 200 TABLET, FILM COATED ORAL at 20:04

## 2017-04-10 RX ADMIN — OXYCODONE HYDROCHLORIDE AND ACETAMINOPHEN SCH MG: 500 TABLET ORAL at 20:04

## 2017-04-10 RX ADMIN — ROSUVASTATIN CALCIUM SCH MG: 10 TABLET, FILM COATED ORAL at 20:05

## 2017-04-10 RX ADMIN — FERROUS SULFATE TAB 325 MG (65 MG ELEMENTAL FE) SCH MG: 325 (65 FE) TAB at 09:54

## 2017-04-10 RX ADMIN — OXYCODONE HYDROCHLORIDE AND ACETAMINOPHEN SCH MG: 500 TABLET ORAL at 09:55

## 2017-04-10 RX ADMIN — METFORMIN HYDROCHLORIDE SCH MG: 500 TABLET, EXTENDED RELEASE ORAL at 09:55

## 2017-04-10 RX ADMIN — DOCUSATE SODIUM SCH MG: 100 CAPSULE, LIQUID FILLED ORAL at 09:54

## 2017-04-10 RX ADMIN — METFORMIN HYDROCHLORIDE SCH MG: 500 TABLET, EXTENDED RELEASE ORAL at 17:33

## 2017-04-10 RX ADMIN — ALLOPURINOL SCH MG: 300 TABLET ORAL at 09:55

## 2017-04-10 RX ADMIN — OXYCODONE HYDROCHLORIDE AND ACETAMINOPHEN SCH MG: 500 TABLET ORAL at 15:05

## 2017-04-10 RX ADMIN — PANTOPRAZOLE SODIUM SCH MG: 40 TABLET, DELAYED RELEASE ORAL at 09:54

## 2017-04-10 RX ADMIN — DOCUSATE SODIUM SCH MG: 100 CAPSULE, LIQUID FILLED ORAL at 20:04

## 2017-04-10 RX ADMIN — MONTELUKAST SODIUM SCH MG: 10 TABLET, FILM COATED ORAL at 20:05

## 2017-04-10 RX ADMIN — LISINOPRIL SCH MG: 5 TABLET ORAL at 09:54

## 2017-04-10 RX ADMIN — DULOXETINE HYDROCHLORIDE SCH MG: 30 CAPSULE, DELAYED RELEASE ORAL at 09:54

## 2017-04-10 RX ADMIN — LEVOTHYROXINE SODIUM SCH MG: 25 TABLET ORAL at 05:52

## 2017-04-10 RX ADMIN — LABETALOL HCL SCH MG: 200 TABLET, FILM COATED ORAL at 09:55

## 2017-04-10 NOTE — IPN
DATE:  04/10/2017

 

I have discontinued Cindy Mena's Effexor.  Today, she had no significant

side effects from the discontinuation of Effexor.  I have begun her on Cymbalta

30 and today raised it to 60 mg.  The patient seems more responsive, though she

continues to stay in bed.  We continue to encourage her to spend time outside of

her room and with other patients.  She is also on Wellbutrin 75 mg.  There seems

to be some improvement in her mood and responsiveness and I am wondering if she

is just naturally cycling out of her depression.  Nonetheless, we will continue

her on her Cymbalta 60 mg, Abilify 3 mg, as well as, her medications for Crestor,

Protonix, Singulair, metformin, Prinivil, Synthroid, and allopurinol.

 

IMPRESSION:  Major depression.

 

PLAN:  Continued observation and encouragement.

## 2017-04-11 VITALS — DIASTOLIC BLOOD PRESSURE: 69 MMHG | SYSTOLIC BLOOD PRESSURE: 144 MMHG

## 2017-04-11 VITALS — DIASTOLIC BLOOD PRESSURE: 63 MMHG | SYSTOLIC BLOOD PRESSURE: 128 MMHG

## 2017-04-11 RX ADMIN — MONTELUKAST SODIUM SCH MG: 10 TABLET, FILM COATED ORAL at 20:15

## 2017-04-11 RX ADMIN — ALLOPURINOL SCH MG: 300 TABLET ORAL at 08:49

## 2017-04-11 RX ADMIN — LISINOPRIL SCH MG: 5 TABLET ORAL at 08:49

## 2017-04-11 RX ADMIN — DOCUSATE SODIUM SCH MG: 100 CAPSULE, LIQUID FILLED ORAL at 08:48

## 2017-04-11 RX ADMIN — OXYCODONE HYDROCHLORIDE AND ACETAMINOPHEN SCH MG: 500 TABLET ORAL at 16:13

## 2017-04-11 RX ADMIN — FERROUS SULFATE TAB 325 MG (65 MG ELEMENTAL FE) SCH MG: 325 (65 FE) TAB at 08:48

## 2017-04-11 RX ADMIN — METFORMIN HYDROCHLORIDE SCH MG: 500 TABLET, EXTENDED RELEASE ORAL at 17:16

## 2017-04-11 RX ADMIN — LEVOTHYROXINE SODIUM SCH MG: 25 TABLET ORAL at 05:58

## 2017-04-11 RX ADMIN — LABETALOL HCL SCH MG: 200 TABLET, FILM COATED ORAL at 08:48

## 2017-04-11 RX ADMIN — OXYCODONE HYDROCHLORIDE AND ACETAMINOPHEN SCH MG: 500 TABLET ORAL at 20:15

## 2017-04-11 RX ADMIN — DULOXETINE HYDROCHLORIDE SCH MG: 30 CAPSULE, DELAYED RELEASE ORAL at 08:49

## 2017-04-11 RX ADMIN — METFORMIN HYDROCHLORIDE SCH MG: 500 TABLET, EXTENDED RELEASE ORAL at 08:49

## 2017-04-11 RX ADMIN — LABETALOL HCL SCH MG: 200 TABLET, FILM COATED ORAL at 20:15

## 2017-04-11 RX ADMIN — ROSUVASTATIN CALCIUM SCH MG: 10 TABLET, FILM COATED ORAL at 20:55

## 2017-04-11 RX ADMIN — PANTOPRAZOLE SODIUM SCH MG: 40 TABLET, DELAYED RELEASE ORAL at 08:49

## 2017-04-11 RX ADMIN — OXYCODONE HYDROCHLORIDE AND ACETAMINOPHEN SCH MG: 500 TABLET ORAL at 08:49

## 2017-04-11 RX ADMIN — DOCUSATE SODIUM SCH MG: 100 CAPSULE, LIQUID FILLED ORAL at 20:15

## 2017-04-11 NOTE — IPN
DATE:  04/11/2017

 

Cindy Mena continues today on her Cymbalta 60 mg.  The patient seems more

responsive again today and was found to be sitting at her desk this morning.  She

still continues to rate her mood as "so-so" and is unsure and ambivalent whether

she is feeling any improvement.  We continued to encourage her to spend time

outside of her room with other patients and staff.  She seems to be having some

improvement in mood and responsiveness.  We will continue her on Cymbalta 30 mg,

Abilify 3 mg, and her Wellbutrin.

 

IMPRESSION:  Major depression.

 

PLAN:  Continued observation and encouragement.

## 2017-04-12 VITALS — SYSTOLIC BLOOD PRESSURE: 140 MMHG | DIASTOLIC BLOOD PRESSURE: 67 MMHG

## 2017-04-12 RX ADMIN — OXYCODONE HYDROCHLORIDE AND ACETAMINOPHEN SCH MG: 500 TABLET ORAL at 15:23

## 2017-04-12 RX ADMIN — LEVOTHYROXINE SODIUM SCH MG: 25 TABLET ORAL at 06:23

## 2017-04-12 RX ADMIN — DOCUSATE SODIUM SCH MG: 100 CAPSULE, LIQUID FILLED ORAL at 09:12

## 2017-04-12 RX ADMIN — METFORMIN HYDROCHLORIDE SCH MG: 500 TABLET, EXTENDED RELEASE ORAL at 17:15

## 2017-04-12 RX ADMIN — ROSUVASTATIN CALCIUM SCH MG: 10 TABLET, FILM COATED ORAL at 20:20

## 2017-04-12 RX ADMIN — OXYCODONE HYDROCHLORIDE AND ACETAMINOPHEN SCH MG: 500 TABLET ORAL at 20:20

## 2017-04-12 RX ADMIN — FERROUS SULFATE TAB 325 MG (65 MG ELEMENTAL FE) SCH MG: 325 (65 FE) TAB at 09:12

## 2017-04-12 RX ADMIN — PANTOPRAZOLE SODIUM SCH MG: 40 TABLET, DELAYED RELEASE ORAL at 09:15

## 2017-04-12 RX ADMIN — ALLOPURINOL SCH MG: 300 TABLET ORAL at 09:13

## 2017-04-12 RX ADMIN — MONTELUKAST SODIUM SCH MG: 10 TABLET, FILM COATED ORAL at 20:19

## 2017-04-12 RX ADMIN — METFORMIN HYDROCHLORIDE SCH MG: 500 TABLET, EXTENDED RELEASE ORAL at 07:55

## 2017-04-12 RX ADMIN — LISINOPRIL SCH MG: 5 TABLET ORAL at 09:15

## 2017-04-12 RX ADMIN — LABETALOL HCL SCH MG: 200 TABLET, FILM COATED ORAL at 09:13

## 2017-04-12 RX ADMIN — LABETALOL HCL SCH MG: 200 TABLET, FILM COATED ORAL at 20:20

## 2017-04-12 RX ADMIN — DULOXETINE HYDROCHLORIDE SCH MG: 30 CAPSULE, DELAYED RELEASE ORAL at 09:12

## 2017-04-12 RX ADMIN — DOCUSATE SODIUM SCH MG: 100 CAPSULE, LIQUID FILLED ORAL at 20:19

## 2017-04-12 RX ADMIN — OXYCODONE HYDROCHLORIDE AND ACETAMINOPHEN SCH MG: 500 TABLET ORAL at 09:13

## 2017-04-12 NOTE — IPN
DATE OF SERVICE:  04/12/2017

 

Cindy Mena is out of bed today.  Eye contact is improving.  Her speech is

becoming more full.  Her thinking is increasing.  The patient was noted to

complain that people were bothering her while she was on the phone.  The patient

smiling, asking when she will be discharged.  Improvement in mood and

responsiveness are noted.  Presently, on Cymbalta 60 mg, Abilify 3 mg, Wellbutrin

75 mg.

 

IMPRESSION:  Major depression.

 

MENTAL STATUS:  Speech is becoming normal in rate and rhythm.  No disturbance of

thought processes.  No loose associations.  No abnormal psychotic thoughts.  No

suicidal ideation.  Judgment and insight improved.  Fully oriented.  Recent and

remote memory are beginning to be intact.  Attention and concentration improving.

Fund of knowledge is full.  Mood is improved.  Affect is brighter.

 

PLAN:  Continue medication, observation, and encouragement.

## 2017-04-13 VITALS — SYSTOLIC BLOOD PRESSURE: 134 MMHG | DIASTOLIC BLOOD PRESSURE: 66 MMHG

## 2017-04-13 VITALS — SYSTOLIC BLOOD PRESSURE: 110 MMHG | DIASTOLIC BLOOD PRESSURE: 56 MMHG

## 2017-04-13 RX ADMIN — METFORMIN HYDROCHLORIDE SCH MG: 500 TABLET, EXTENDED RELEASE ORAL at 08:46

## 2017-04-13 RX ADMIN — DOCUSATE SODIUM SCH MG: 100 CAPSULE, LIQUID FILLED ORAL at 08:45

## 2017-04-13 RX ADMIN — LABETALOL HCL SCH MG: 200 TABLET, FILM COATED ORAL at 20:20

## 2017-04-13 RX ADMIN — DULOXETINE HYDROCHLORIDE SCH MG: 30 CAPSULE, DELAYED RELEASE ORAL at 08:45

## 2017-04-13 RX ADMIN — ROSUVASTATIN CALCIUM SCH MG: 10 TABLET, FILM COATED ORAL at 20:20

## 2017-04-13 RX ADMIN — OXYCODONE HYDROCHLORIDE AND ACETAMINOPHEN SCH MG: 500 TABLET ORAL at 08:45

## 2017-04-13 RX ADMIN — LEVOTHYROXINE SODIUM SCH MG: 25 TABLET ORAL at 06:04

## 2017-04-13 RX ADMIN — ALLOPURINOL SCH MG: 300 TABLET ORAL at 08:45

## 2017-04-13 RX ADMIN — OXYCODONE HYDROCHLORIDE AND ACETAMINOPHEN SCH MG: 500 TABLET ORAL at 15:40

## 2017-04-13 RX ADMIN — METFORMIN HYDROCHLORIDE SCH MG: 500 TABLET, EXTENDED RELEASE ORAL at 17:44

## 2017-04-13 RX ADMIN — OXYCODONE HYDROCHLORIDE AND ACETAMINOPHEN SCH MG: 500 TABLET ORAL at 20:21

## 2017-04-13 RX ADMIN — PANTOPRAZOLE SODIUM SCH MG: 40 TABLET, DELAYED RELEASE ORAL at 08:45

## 2017-04-13 RX ADMIN — FERROUS SULFATE TAB 325 MG (65 MG ELEMENTAL FE) SCH MG: 325 (65 FE) TAB at 08:46

## 2017-04-13 RX ADMIN — LABETALOL HCL SCH MG: 200 TABLET, FILM COATED ORAL at 08:45

## 2017-04-13 RX ADMIN — MONTELUKAST SODIUM SCH MG: 10 TABLET, FILM COATED ORAL at 20:21

## 2017-04-13 RX ADMIN — DOCUSATE SODIUM SCH MG: 100 CAPSULE, LIQUID FILLED ORAL at 20:21

## 2017-04-13 RX ADMIN — LISINOPRIL SCH MG: 5 TABLET ORAL at 08:46

## 2017-04-13 NOTE — IPN
DATE:   04/13/2017

 

I met with Cindy Mena in the patient lounge today with staff.  Eye contact

was excellent.  Her speech is becoming more full, although she stated her mood

was "so, so." I was able to joke with her and get her to laugh and she was able

to joke with me.  Unquestionably, we are having some improvement. I am still not

wanting to increase her medication due to her age, but we seem to be making

progress.

 

IMPRESSION:

1.  Major depression.  Speech is becoming normal in rate and rhythm.  No

disturbance of thought process.  No loose associations.  No abnormal psychotic

thoughts or suicidal ideation.  Judgment and insight are improved.  Fully

oriented.  Recent and remote memory are beginning to be intact.  Attention and

concentration are improving.  Mood is improved.  Affect is brighter.

 

PLAN:  Hoping the patient will go visit the gift shop today with staff.  Continue

her medications.  Observation and encouragement will be given.  We will be

calling daughter for her opinion of the patient's present status.

## 2017-04-14 VITALS — DIASTOLIC BLOOD PRESSURE: 63 MMHG | SYSTOLIC BLOOD PRESSURE: 130 MMHG

## 2017-04-14 VITALS — SYSTOLIC BLOOD PRESSURE: 117 MMHG | DIASTOLIC BLOOD PRESSURE: 56 MMHG

## 2017-04-14 RX ADMIN — DULOXETINE HYDROCHLORIDE SCH MG: 30 CAPSULE, DELAYED RELEASE ORAL at 08:26

## 2017-04-14 RX ADMIN — METFORMIN HYDROCHLORIDE SCH MG: 500 TABLET, EXTENDED RELEASE ORAL at 08:26

## 2017-04-14 RX ADMIN — ALLOPURINOL SCH MG: 300 TABLET ORAL at 08:26

## 2017-04-14 RX ADMIN — LABETALOL HCL SCH MG: 200 TABLET, FILM COATED ORAL at 20:28

## 2017-04-14 RX ADMIN — DOCUSATE SODIUM SCH MG: 100 CAPSULE, LIQUID FILLED ORAL at 20:29

## 2017-04-14 RX ADMIN — LABETALOL HCL SCH MG: 200 TABLET, FILM COATED ORAL at 08:26

## 2017-04-14 RX ADMIN — OXYCODONE HYDROCHLORIDE AND ACETAMINOPHEN SCH MG: 500 TABLET ORAL at 15:53

## 2017-04-14 RX ADMIN — OXYCODONE HYDROCHLORIDE AND ACETAMINOPHEN SCH MG: 500 TABLET ORAL at 20:29

## 2017-04-14 RX ADMIN — MONTELUKAST SODIUM SCH MG: 10 TABLET, FILM COATED ORAL at 20:28

## 2017-04-14 RX ADMIN — DOCUSATE SODIUM SCH MG: 100 CAPSULE, LIQUID FILLED ORAL at 08:25

## 2017-04-14 RX ADMIN — OXYCODONE HYDROCHLORIDE AND ACETAMINOPHEN SCH MG: 500 TABLET ORAL at 08:26

## 2017-04-14 RX ADMIN — FERROUS SULFATE TAB 325 MG (65 MG ELEMENTAL FE) SCH MG: 325 (65 FE) TAB at 08:26

## 2017-04-14 RX ADMIN — ROSUVASTATIN CALCIUM SCH MG: 10 TABLET, FILM COATED ORAL at 20:29

## 2017-04-14 RX ADMIN — LISINOPRIL SCH MG: 5 TABLET ORAL at 08:26

## 2017-04-14 RX ADMIN — LEVOTHYROXINE SODIUM SCH MG: 25 TABLET ORAL at 05:54

## 2017-04-14 RX ADMIN — METFORMIN HYDROCHLORIDE SCH MG: 500 TABLET, EXTENDED RELEASE ORAL at 17:33

## 2017-04-14 RX ADMIN — PANTOPRAZOLE SODIUM SCH MG: 40 TABLET, DELAYED RELEASE ORAL at 08:26

## 2017-04-14 NOTE — IPN
DATE:  04/14/2017

 

VITAL SIGNS:  Blood pressure continues at 117/56.  The patient has had no

significant side effects of medication.  The patient has been out of bed more.

The patient has been in the lounge more.  The patient still refusing to go to the

gift shop.  Have increased today Wellbutrin to 150 mg.  She continues on Cymbalta

60 mg.  She continues on Abilify 3 mg at bedtime.  There seems to be some

improvement in mood and activity.  Eye contact is better.  The patient does seem

to have an improving sense of humor.  We spoke with the daughter this morning,

she sees an improvement, but the patient is still not at baseline.  Treatment

will continue as above.

 

DIAGNOSIS:  Major depression.

## 2017-04-15 VITALS — SYSTOLIC BLOOD PRESSURE: 134 MMHG | DIASTOLIC BLOOD PRESSURE: 59 MMHG

## 2017-04-15 VITALS — DIASTOLIC BLOOD PRESSURE: 74 MMHG | SYSTOLIC BLOOD PRESSURE: 187 MMHG

## 2017-04-15 RX ADMIN — LABETALOL HCL SCH MG: 200 TABLET, FILM COATED ORAL at 09:47

## 2017-04-15 RX ADMIN — MONTELUKAST SODIUM SCH MG: 10 TABLET, FILM COATED ORAL at 20:49

## 2017-04-15 RX ADMIN — ALLOPURINOL SCH MG: 300 TABLET ORAL at 09:44

## 2017-04-15 RX ADMIN — DOCUSATE SODIUM SCH MG: 100 CAPSULE, LIQUID FILLED ORAL at 20:49

## 2017-04-15 RX ADMIN — PANTOPRAZOLE SODIUM SCH MG: 40 TABLET, DELAYED RELEASE ORAL at 09:45

## 2017-04-15 RX ADMIN — LABETALOL HCL SCH MG: 200 TABLET, FILM COATED ORAL at 20:48

## 2017-04-15 RX ADMIN — ROSUVASTATIN CALCIUM SCH MG: 10 TABLET, FILM COATED ORAL at 20:46

## 2017-04-15 RX ADMIN — DULOXETINE HYDROCHLORIDE SCH MG: 30 CAPSULE, DELAYED RELEASE ORAL at 09:45

## 2017-04-15 RX ADMIN — FERROUS SULFATE TAB 325 MG (65 MG ELEMENTAL FE) SCH MG: 325 (65 FE) TAB at 09:45

## 2017-04-15 RX ADMIN — METFORMIN HYDROCHLORIDE SCH MG: 500 TABLET, EXTENDED RELEASE ORAL at 16:16

## 2017-04-15 RX ADMIN — DOCUSATE SODIUM SCH MG: 100 CAPSULE, LIQUID FILLED ORAL at 09:45

## 2017-04-15 RX ADMIN — LISINOPRIL SCH MG: 5 TABLET ORAL at 09:45

## 2017-04-15 RX ADMIN — OXYCODONE HYDROCHLORIDE AND ACETAMINOPHEN SCH MG: 500 TABLET ORAL at 20:51

## 2017-04-15 RX ADMIN — OXYCODONE HYDROCHLORIDE AND ACETAMINOPHEN SCH MG: 500 TABLET ORAL at 09:45

## 2017-04-15 RX ADMIN — METFORMIN HYDROCHLORIDE SCH MG: 500 TABLET, EXTENDED RELEASE ORAL at 09:45

## 2017-04-15 RX ADMIN — OXYCODONE HYDROCHLORIDE AND ACETAMINOPHEN SCH MG: 500 TABLET ORAL at 16:15

## 2017-04-15 RX ADMIN — LEVOTHYROXINE SODIUM SCH MG: 25 TABLET ORAL at 05:47

## 2017-04-15 NOTE — IPN
DATE:  04/15/2017

 

SUBJECTIVE:

I met with Cindy today and her daughter. She does not seem as well today. Her

affect is flat. She is lying in bed. She is not as active as yesterday. The only

difference medication wise is that I had increased her Wellbutrin to 150 XR. I

have decreased it again to 75 regular Wellbutrin, along with her Cymbalta.

 

MENTAL STATUS EXAMINATION:

Poverty of speech. No loose associations. No abnormal or psychotic thoughts.

Judgment and insight fair. Fully oriented. Recent and remote memory intact. No

disturbance of attention, concentration. Mood is low, affect is flat.

 

PLAN:

Will observe tomorrow. May consider an increase in Cymbalta. Will not increase

Wellbutrin again at this time.

## 2017-04-16 VITALS — DIASTOLIC BLOOD PRESSURE: 62 MMHG | SYSTOLIC BLOOD PRESSURE: 130 MMHG

## 2017-04-16 VITALS — DIASTOLIC BLOOD PRESSURE: 61 MMHG | SYSTOLIC BLOOD PRESSURE: 125 MMHG

## 2017-04-16 RX ADMIN — OXYCODONE HYDROCHLORIDE AND ACETAMINOPHEN SCH MG: 500 TABLET ORAL at 15:54

## 2017-04-16 RX ADMIN — DULOXETINE HYDROCHLORIDE SCH MG: 30 CAPSULE, DELAYED RELEASE ORAL at 09:56

## 2017-04-16 RX ADMIN — DOCUSATE SODIUM SCH MG: 100 CAPSULE, LIQUID FILLED ORAL at 09:55

## 2017-04-16 RX ADMIN — OXYCODONE HYDROCHLORIDE AND ACETAMINOPHEN SCH MG: 500 TABLET ORAL at 21:00

## 2017-04-16 RX ADMIN — LABETALOL HCL SCH MG: 200 TABLET, FILM COATED ORAL at 21:38

## 2017-04-16 RX ADMIN — PANTOPRAZOLE SODIUM SCH MG: 40 TABLET, DELAYED RELEASE ORAL at 09:56

## 2017-04-16 RX ADMIN — ALLOPURINOL SCH MG: 300 TABLET ORAL at 09:56

## 2017-04-16 RX ADMIN — OXYCODONE HYDROCHLORIDE AND ACETAMINOPHEN SCH MG: 500 TABLET ORAL at 09:56

## 2017-04-16 RX ADMIN — DOCUSATE SODIUM SCH MG: 100 CAPSULE, LIQUID FILLED ORAL at 21:37

## 2017-04-16 RX ADMIN — FERROUS SULFATE TAB 325 MG (65 MG ELEMENTAL FE) SCH MG: 325 (65 FE) TAB at 09:56

## 2017-04-16 RX ADMIN — LISINOPRIL SCH MG: 5 TABLET ORAL at 09:56

## 2017-04-16 RX ADMIN — LABETALOL HCL SCH MG: 200 TABLET, FILM COATED ORAL at 09:56

## 2017-04-16 RX ADMIN — LEVOTHYROXINE SODIUM SCH MG: 25 TABLET ORAL at 06:03

## 2017-04-16 RX ADMIN — MONTELUKAST SODIUM SCH MG: 10 TABLET, FILM COATED ORAL at 21:38

## 2017-04-16 RX ADMIN — METFORMIN HYDROCHLORIDE SCH MG: 500 TABLET, EXTENDED RELEASE ORAL at 17:17

## 2017-04-16 RX ADMIN — METFORMIN HYDROCHLORIDE SCH MG: 500 TABLET, EXTENDED RELEASE ORAL at 09:56

## 2017-04-16 RX ADMIN — ROSUVASTATIN CALCIUM SCH MG: 10 TABLET, FILM COATED ORAL at 21:38

## 2017-04-17 VITALS — SYSTOLIC BLOOD PRESSURE: 113 MMHG | DIASTOLIC BLOOD PRESSURE: 60 MMHG

## 2017-04-17 VITALS — DIASTOLIC BLOOD PRESSURE: 55 MMHG | SYSTOLIC BLOOD PRESSURE: 118 MMHG

## 2017-04-17 RX ADMIN — DOCUSATE SODIUM SCH MG: 100 CAPSULE, LIQUID FILLED ORAL at 20:15

## 2017-04-17 RX ADMIN — DOCUSATE SODIUM SCH MG: 100 CAPSULE, LIQUID FILLED ORAL at 08:55

## 2017-04-17 RX ADMIN — METFORMIN HYDROCHLORIDE SCH MG: 500 TABLET, EXTENDED RELEASE ORAL at 08:55

## 2017-04-17 RX ADMIN — DULOXETINE HYDROCHLORIDE SCH MG: 30 CAPSULE, DELAYED RELEASE ORAL at 08:54

## 2017-04-17 RX ADMIN — PANTOPRAZOLE SODIUM SCH MG: 40 TABLET, DELAYED RELEASE ORAL at 08:55

## 2017-04-17 RX ADMIN — ROSUVASTATIN CALCIUM SCH MG: 10 TABLET, FILM COATED ORAL at 20:16

## 2017-04-17 RX ADMIN — MONTELUKAST SODIUM SCH MG: 10 TABLET, FILM COATED ORAL at 20:15

## 2017-04-17 RX ADMIN — FERROUS SULFATE TAB 325 MG (65 MG ELEMENTAL FE) SCH MG: 325 (65 FE) TAB at 08:54

## 2017-04-17 RX ADMIN — ALLOPURINOL SCH MG: 300 TABLET ORAL at 08:55

## 2017-04-17 RX ADMIN — LEVOTHYROXINE SODIUM SCH MG: 25 TABLET ORAL at 05:47

## 2017-04-17 RX ADMIN — OXYCODONE HYDROCHLORIDE AND ACETAMINOPHEN SCH MG: 500 TABLET ORAL at 16:06

## 2017-04-17 RX ADMIN — OXYCODONE HYDROCHLORIDE AND ACETAMINOPHEN SCH MG: 500 TABLET ORAL at 20:15

## 2017-04-17 RX ADMIN — LABETALOL HCL SCH MG: 200 TABLET, FILM COATED ORAL at 20:15

## 2017-04-17 RX ADMIN — METFORMIN HYDROCHLORIDE SCH MG: 500 TABLET, EXTENDED RELEASE ORAL at 17:55

## 2017-04-17 RX ADMIN — LISINOPRIL SCH MG: 5 TABLET ORAL at 08:54

## 2017-04-17 RX ADMIN — OXYCODONE HYDROCHLORIDE AND ACETAMINOPHEN SCH MG: 500 TABLET ORAL at 08:54

## 2017-04-17 RX ADMIN — LABETALOL HCL SCH MG: 200 TABLET, FILM COATED ORAL at 08:55

## 2017-04-17 NOTE — IPN
DATE:   04/17/2017

 

Cindy Mena was up today and ate her breakfast.  I am observing her based on

my increase of her Cymbalta to 90 mg.

 

MENTAL STATUS EXAMINATION:  No loose associations.  No abnormal or psychotic

thoughts.  Judgment and insight fair.  She is fully oriented.  Recent and remote

memory are intact.  No disturbance of attention and concentration.  Mood

continues low.  Affect continues flat.  Speech, she has poverty of speech and

thought.  However, today her mood seems somewhat improved and affect slightly

brighter.

 

PRESENT MEDICATIONS:

Include:

- Abilify 3 mg

- bupropion 75 mg

- Cymbalta 90 mg daily

 

Our hope is to avoid her having to go to long-term care or receive electric shock

therapy.  She does not appear, in my opinion, to be a candidate for electric

shock therapy at this time.  We are hoping for an improvement in mood.

 

Speech is minimal.  Thought processes revealed poverty of thought.  No loose

associations.  No abnormal or psychotic thoughts.  Judgment and insight fair.

Fully oriented.  Recent and remote memory are intact.  No disturbances of

language.  Mood slightly improving.  Affect slightly brighter.

 

DIAGNOSIS:

1.  Major depression.

## 2017-04-17 NOTE — IPN
DATE:  04/16/2017

 

SUBJECTIVE:

I met with Cindy today. She seemed in slightly improved mood. Her affect

continues still flat. She promises me today she will out of bed and walking the

halls. I am hoping to observe her today for activity and mood improvement. I have

changed her Wellbutrin from 150 back to 75 mg regular. I have not chosen yet to

increase her Cymbalta.

 

MENTAL STATUS EXAMINATION:

No loose associations. No abnormal or psychotic thoughts. Judgment and insight

fair. Fully oriented. Recent and remote memory intact. No disturbance of

attention or concentration. Mood continues low. Affect continues flat.

 

PLAN:

To observe today and possibly consider increasing Cymbalta to 90 mg.

## 2017-04-18 VITALS — DIASTOLIC BLOOD PRESSURE: 56 MMHG | SYSTOLIC BLOOD PRESSURE: 100 MMHG

## 2017-04-18 VITALS — DIASTOLIC BLOOD PRESSURE: 70 MMHG | SYSTOLIC BLOOD PRESSURE: 143 MMHG

## 2017-04-18 RX ADMIN — DOCUSATE SODIUM SCH MG: 100 CAPSULE, LIQUID FILLED ORAL at 09:05

## 2017-04-18 RX ADMIN — ALLOPURINOL SCH MG: 300 TABLET ORAL at 09:07

## 2017-04-18 RX ADMIN — PANTOPRAZOLE SODIUM SCH MG: 40 TABLET, DELAYED RELEASE ORAL at 09:04

## 2017-04-18 RX ADMIN — MONTELUKAST SODIUM SCH MG: 10 TABLET, FILM COATED ORAL at 20:09

## 2017-04-18 RX ADMIN — METFORMIN HYDROCHLORIDE SCH MG: 500 TABLET, EXTENDED RELEASE ORAL at 17:05

## 2017-04-18 RX ADMIN — OXYCODONE HYDROCHLORIDE AND ACETAMINOPHEN SCH MG: 500 TABLET ORAL at 15:58

## 2017-04-18 RX ADMIN — OXYCODONE HYDROCHLORIDE AND ACETAMINOPHEN SCH MG: 500 TABLET ORAL at 09:05

## 2017-04-18 RX ADMIN — DOCUSATE SODIUM SCH MG: 100 CAPSULE, LIQUID FILLED ORAL at 20:09

## 2017-04-18 RX ADMIN — METFORMIN HYDROCHLORIDE SCH MG: 500 TABLET, EXTENDED RELEASE ORAL at 09:04

## 2017-04-18 RX ADMIN — DULOXETINE HYDROCHLORIDE SCH MG: 30 CAPSULE, DELAYED RELEASE ORAL at 09:05

## 2017-04-18 RX ADMIN — FERROUS SULFATE TAB 325 MG (65 MG ELEMENTAL FE) SCH MG: 325 (65 FE) TAB at 09:05

## 2017-04-18 RX ADMIN — OXYCODONE HYDROCHLORIDE AND ACETAMINOPHEN SCH MG: 500 TABLET ORAL at 20:09

## 2017-04-18 RX ADMIN — LABETALOL HCL SCH MG: 200 TABLET, FILM COATED ORAL at 20:09

## 2017-04-18 RX ADMIN — LISINOPRIL SCH MG: 5 TABLET ORAL at 09:05

## 2017-04-18 RX ADMIN — LEVOTHYROXINE SODIUM SCH MG: 25 TABLET ORAL at 06:06

## 2017-04-18 RX ADMIN — METHYLPHENIDATE HYDROCHLORIDE SCH MG: 5 TABLET ORAL at 11:43

## 2017-04-18 RX ADMIN — ROSUVASTATIN CALCIUM SCH MG: 10 TABLET, FILM COATED ORAL at 20:09

## 2017-04-18 RX ADMIN — LABETALOL HCL SCH MG: 200 TABLET, FILM COATED ORAL at 09:05

## 2017-04-18 NOTE — IPN
DATE: 04/18/2017

 

Cindy Mena did eat her breakfast today but she was back in bed. No

significant improvement in her mood, energy, or self assessment. Today, I added

Ritalin 5 mg to her medical regimen in some attempt to augment her

antidepressant.

 

MENTAL STATUS EXAMINATION: No loose associations. No abnormal or psychotic

thoughts. Insight and judgment fair. She is fully oriented. Recent and remote

memory intact. There is a poverty of speech. No disturbance of attention or

concentration. Mood continues low. Affect flat. No significant improvement in

mood noted today.

 

PRESENT MEDICATIONS:

- Abilify 3

- Ritalin 5 mg

- bupropion 75 mg

- Cymbalta 90 mg

 

Long-term placement is in process.

## 2017-04-19 VITALS — SYSTOLIC BLOOD PRESSURE: 140 MMHG | DIASTOLIC BLOOD PRESSURE: 70 MMHG

## 2017-04-19 VITALS — SYSTOLIC BLOOD PRESSURE: 117 MMHG | DIASTOLIC BLOOD PRESSURE: 57 MMHG

## 2017-04-19 RX ADMIN — DULOXETINE HYDROCHLORIDE SCH MG: 30 CAPSULE, DELAYED RELEASE ORAL at 09:17

## 2017-04-19 RX ADMIN — OXYCODONE HYDROCHLORIDE AND ACETAMINOPHEN SCH MG: 500 TABLET ORAL at 09:17

## 2017-04-19 RX ADMIN — PANTOPRAZOLE SODIUM SCH MG: 40 TABLET, DELAYED RELEASE ORAL at 09:17

## 2017-04-19 RX ADMIN — LABETALOL HCL SCH MG: 200 TABLET, FILM COATED ORAL at 20:17

## 2017-04-19 RX ADMIN — ALUMINUM HYDROXIDE, MAGNESIUM HYDROXIDE, AND SIMETHICONE PRN ML: 200; 200; 20 SUSPENSION ORAL at 20:16

## 2017-04-19 RX ADMIN — ALLOPURINOL SCH MG: 300 TABLET ORAL at 09:17

## 2017-04-19 RX ADMIN — METFORMIN HYDROCHLORIDE SCH MG: 500 TABLET, EXTENDED RELEASE ORAL at 17:11

## 2017-04-19 RX ADMIN — METFORMIN HYDROCHLORIDE SCH MG: 500 TABLET, EXTENDED RELEASE ORAL at 09:16

## 2017-04-19 RX ADMIN — ROSUVASTATIN CALCIUM SCH MG: 10 TABLET, FILM COATED ORAL at 20:17

## 2017-04-19 RX ADMIN — METHYLPHENIDATE HYDROCHLORIDE SCH MG: 5 TABLET ORAL at 09:17

## 2017-04-19 RX ADMIN — OXYCODONE HYDROCHLORIDE AND ACETAMINOPHEN SCH MG: 500 TABLET ORAL at 20:17

## 2017-04-19 RX ADMIN — LABETALOL HCL SCH MG: 200 TABLET, FILM COATED ORAL at 09:19

## 2017-04-19 RX ADMIN — DOCUSATE SODIUM SCH MG: 100 CAPSULE, LIQUID FILLED ORAL at 20:17

## 2017-04-19 RX ADMIN — OXYCODONE HYDROCHLORIDE AND ACETAMINOPHEN SCH MG: 500 TABLET ORAL at 16:01

## 2017-04-19 RX ADMIN — FERROUS SULFATE TAB 325 MG (65 MG ELEMENTAL FE) SCH MG: 325 (65 FE) TAB at 09:17

## 2017-04-19 RX ADMIN — LEVOTHYROXINE SODIUM SCH MG: 25 TABLET ORAL at 06:14

## 2017-04-19 RX ADMIN — MONTELUKAST SODIUM SCH MG: 10 TABLET, FILM COATED ORAL at 20:18

## 2017-04-19 RX ADMIN — DOCUSATE SODIUM SCH MG: 100 CAPSULE, LIQUID FILLED ORAL at 09:17

## 2017-04-19 RX ADMIN — LISINOPRIL SCH MG: 5 TABLET ORAL at 09:19

## 2017-04-19 NOTE — IPN
DATE:  04/19/2017

 

Cindy Mena did eat today, but continues back in bed.  Again, no significant

improvement in mood, energy or self assessment.  I added Ritalin 5 mg to her

morning regimen in an attempt to augment her antidepressant.  No significant or

dramatic improvement.

 

MENTAL STATUS EXAMINATION:  No loose associations.  No abnormal or psychotic

thoughts.  Insight and judgment fair.  She is fully oriented.  Recent and remote

memory intact.  Poverty of speech.  No disturbance of attention or concentration.

Mood continues low.  Affect flat.  No significant improved mood today.

 

PRESENT MEDICATIONS:  Abilify 3, Ritalin 5 mg, bupropion 75 mg, Cymbalta 90 mg.

 

DIAGNOSIS:  Major depressive illness.

 

Long term placement may be necessary.

## 2017-04-20 VITALS — SYSTOLIC BLOOD PRESSURE: 141 MMHG | DIASTOLIC BLOOD PRESSURE: 70 MMHG

## 2017-04-20 VITALS — SYSTOLIC BLOOD PRESSURE: 141 MMHG | DIASTOLIC BLOOD PRESSURE: 82 MMHG

## 2017-04-20 LAB
T3RU NFR SERPL: 36 % (ref 30–39)
T4 SERPL-MCNC: 7.3 UG/DL (ref 4.5–12)

## 2017-04-20 RX ADMIN — OXYCODONE HYDROCHLORIDE AND ACETAMINOPHEN SCH MG: 500 TABLET ORAL at 20:20

## 2017-04-20 RX ADMIN — PANTOPRAZOLE SODIUM SCH MG: 40 TABLET, DELAYED RELEASE ORAL at 09:00

## 2017-04-20 RX ADMIN — OXYCODONE HYDROCHLORIDE AND ACETAMINOPHEN SCH MG: 500 TABLET ORAL at 16:13

## 2017-04-20 RX ADMIN — METHYLPHENIDATE HYDROCHLORIDE SCH MG: 5 TABLET ORAL at 08:58

## 2017-04-20 RX ADMIN — METFORMIN HYDROCHLORIDE SCH MG: 500 TABLET, EXTENDED RELEASE ORAL at 17:18

## 2017-04-20 RX ADMIN — ROSUVASTATIN CALCIUM SCH MG: 10 TABLET, FILM COATED ORAL at 20:24

## 2017-04-20 RX ADMIN — LEVOTHYROXINE SODIUM SCH MG: 25 TABLET ORAL at 05:47

## 2017-04-20 RX ADMIN — METFORMIN HYDROCHLORIDE SCH MG: 500 TABLET, EXTENDED RELEASE ORAL at 08:59

## 2017-04-20 RX ADMIN — DOCUSATE SODIUM SCH MG: 100 CAPSULE, LIQUID FILLED ORAL at 09:00

## 2017-04-20 RX ADMIN — LISINOPRIL SCH MG: 5 TABLET ORAL at 09:00

## 2017-04-20 RX ADMIN — LABETALOL HCL SCH MG: 200 TABLET, FILM COATED ORAL at 20:23

## 2017-04-20 RX ADMIN — LABETALOL HCL SCH MG: 200 TABLET, FILM COATED ORAL at 08:59

## 2017-04-20 RX ADMIN — FERROUS SULFATE TAB 325 MG (65 MG ELEMENTAL FE) SCH MG: 325 (65 FE) TAB at 08:59

## 2017-04-20 RX ADMIN — OXYCODONE HYDROCHLORIDE AND ACETAMINOPHEN SCH MG: 500 TABLET ORAL at 09:00

## 2017-04-20 RX ADMIN — ALLOPURINOL SCH MG: 300 TABLET ORAL at 09:00

## 2017-04-20 RX ADMIN — DOCUSATE SODIUM SCH MG: 100 CAPSULE, LIQUID FILLED ORAL at 20:20

## 2017-04-20 RX ADMIN — MONTELUKAST SODIUM SCH MG: 10 TABLET, FILM COATED ORAL at 20:19

## 2017-04-20 RX ADMIN — DULOXETINE HYDROCHLORIDE SCH MG: 30 CAPSULE, DELAYED RELEASE ORAL at 09:00

## 2017-04-20 NOTE — IPN
DATE:  04/20/2017

 

Continuing treatment of Cindy's severe depression.  Staff reported to me that

this morning she took a shower on her own and in addition, reported that

yesterday she demonstrated an increased affect and some humor with staff.  I 
have

increased her Ritalin today to 10 mg in the morning in addition to the Cymbalta

90 and Wellbutrin 75.  Additionally she is on Abilify 3 mg.  I am doing this in

an attempt to hopefully improve her mood and avoid long term care or ECT which

she has had in the past.  I may consider discontinuing the Abilify tomorrow also

to see what combination helps this woman.  Presently I will repeat her thyroid

test also.  



Patient is still showing some poverty of speech.  Logical thought

process.  No loose associations.  No psychotic thoughts.  Judgment and insight

fair.  Fully oriented.  Recent and remote memory intact.  Attention and

concentration is difficult to assess at this time.  Fund of knowledge is full.

Mood is low.  Affect is flat.



Diagnosis:Major Depressive Illness

MTDD

## 2017-04-21 VITALS — SYSTOLIC BLOOD PRESSURE: 100 MMHG | DIASTOLIC BLOOD PRESSURE: 49 MMHG

## 2017-04-21 VITALS — DIASTOLIC BLOOD PRESSURE: 86 MMHG | SYSTOLIC BLOOD PRESSURE: 126 MMHG

## 2017-04-21 VITALS — SYSTOLIC BLOOD PRESSURE: 137 MMHG | DIASTOLIC BLOOD PRESSURE: 62 MMHG

## 2017-04-21 VITALS — DIASTOLIC BLOOD PRESSURE: 59 MMHG | SYSTOLIC BLOOD PRESSURE: 130 MMHG

## 2017-04-21 RX ADMIN — DOCUSATE SODIUM SCH MG: 100 CAPSULE, LIQUID FILLED ORAL at 21:25

## 2017-04-21 RX ADMIN — PANTOPRAZOLE SODIUM SCH MG: 40 TABLET, DELAYED RELEASE ORAL at 09:09

## 2017-04-21 RX ADMIN — DULOXETINE HYDROCHLORIDE SCH MG: 30 CAPSULE, DELAYED RELEASE ORAL at 09:08

## 2017-04-21 RX ADMIN — LABETALOL HCL SCH MG: 200 TABLET, FILM COATED ORAL at 09:23

## 2017-04-21 RX ADMIN — MONTELUKAST SODIUM SCH MG: 10 TABLET, FILM COATED ORAL at 21:24

## 2017-04-21 RX ADMIN — FERROUS SULFATE TAB 325 MG (65 MG ELEMENTAL FE) SCH MG: 325 (65 FE) TAB at 09:09

## 2017-04-21 RX ADMIN — DOCUSATE SODIUM SCH MG: 100 CAPSULE, LIQUID FILLED ORAL at 09:08

## 2017-04-21 RX ADMIN — OXYCODONE HYDROCHLORIDE AND ACETAMINOPHEN SCH MG: 500 TABLET ORAL at 09:09

## 2017-04-21 RX ADMIN — LISINOPRIL SCH MG: 5 TABLET ORAL at 09:23

## 2017-04-21 RX ADMIN — METFORMIN HYDROCHLORIDE SCH MG: 500 TABLET, EXTENDED RELEASE ORAL at 09:08

## 2017-04-21 RX ADMIN — OXYCODONE HYDROCHLORIDE AND ACETAMINOPHEN SCH MG: 500 TABLET ORAL at 16:33

## 2017-04-21 RX ADMIN — OXYCODONE HYDROCHLORIDE AND ACETAMINOPHEN SCH MG: 500 TABLET ORAL at 21:24

## 2017-04-21 RX ADMIN — METHYLPHENIDATE HYDROCHLORIDE SCH MG: 5 TABLET ORAL at 09:09

## 2017-04-21 RX ADMIN — ROSUVASTATIN CALCIUM SCH MG: 10 TABLET, FILM COATED ORAL at 21:25

## 2017-04-21 RX ADMIN — ALLOPURINOL SCH MG: 300 TABLET ORAL at 09:10

## 2017-04-21 RX ADMIN — LABETALOL HCL SCH MG: 200 TABLET, FILM COATED ORAL at 21:25

## 2017-04-21 RX ADMIN — METFORMIN HYDROCHLORIDE SCH MG: 500 TABLET, EXTENDED RELEASE ORAL at 16:33

## 2017-04-21 RX ADMIN — LEVOTHYROXINE SODIUM SCH MG: 25 TABLET ORAL at 05:41

## 2017-04-21 NOTE — IPN
DATE:  04/21/2017

 

Again reports by staff that they are seeing some increase in her brightness.

Patient is not laying in bed when I examined her this morning with eating.  She

is not able, however, to state that she feels any significant improvement in

mood.  I am discontinuing her Abilify today as it did not seem to add

significantly to her mood improvement.  I continue her now on bupropion 75 mg,

methylphenidate 10 mg in the morning and Cymbalta 90 mg daily.



  Speech is normal.

Thought process is intact but there is a poverty of speech and thought.  No 
loose

associations.  No abnormal psychotic thoughts.  Judgment and insight fair.  She

is fully oriented.  Recent and remote memory seem intact.  Attention and

concentration are normal.  No disturbance of language.  Fund of knowledge is

normal.  Mood is fair.  Affect is still flat.

 

IMPRESSION:

Major depressive illness.

MTDD

## 2017-04-22 VITALS — SYSTOLIC BLOOD PRESSURE: 110 MMHG | DIASTOLIC BLOOD PRESSURE: 63 MMHG

## 2017-04-22 VITALS — DIASTOLIC BLOOD PRESSURE: 63 MMHG | SYSTOLIC BLOOD PRESSURE: 137 MMHG

## 2017-04-22 RX ADMIN — DULOXETINE HYDROCHLORIDE SCH MG: 30 CAPSULE, DELAYED RELEASE ORAL at 09:42

## 2017-04-22 RX ADMIN — OXYCODONE HYDROCHLORIDE AND ACETAMINOPHEN SCH MG: 500 TABLET ORAL at 20:13

## 2017-04-22 RX ADMIN — LABETALOL HCL SCH MG: 200 TABLET, FILM COATED ORAL at 09:00

## 2017-04-22 RX ADMIN — DOCUSATE SODIUM SCH MG: 100 CAPSULE, LIQUID FILLED ORAL at 09:42

## 2017-04-22 RX ADMIN — Medication SCH EA: at 00:01

## 2017-04-22 RX ADMIN — LABETALOL HCL SCH MG: 200 TABLET, FILM COATED ORAL at 20:12

## 2017-04-22 RX ADMIN — DOCUSATE SODIUM SCH MG: 100 CAPSULE, LIQUID FILLED ORAL at 20:12

## 2017-04-22 RX ADMIN — METFORMIN HYDROCHLORIDE SCH MG: 500 TABLET, EXTENDED RELEASE ORAL at 17:16

## 2017-04-22 RX ADMIN — FERROUS SULFATE TAB 325 MG (65 MG ELEMENTAL FE) SCH MG: 325 (65 FE) TAB at 09:39

## 2017-04-22 RX ADMIN — METHYLPHENIDATE HYDROCHLORIDE SCH MG: 5 TABLET ORAL at 09:39

## 2017-04-22 RX ADMIN — ROSUVASTATIN CALCIUM SCH MG: 10 TABLET, FILM COATED ORAL at 20:12

## 2017-04-22 RX ADMIN — PANTOPRAZOLE SODIUM SCH MG: 40 TABLET, DELAYED RELEASE ORAL at 09:42

## 2017-04-22 RX ADMIN — OXYCODONE HYDROCHLORIDE AND ACETAMINOPHEN SCH MG: 500 TABLET ORAL at 09:42

## 2017-04-22 RX ADMIN — OXYCODONE HYDROCHLORIDE AND ACETAMINOPHEN SCH MG: 500 TABLET ORAL at 16:11

## 2017-04-22 RX ADMIN — METFORMIN HYDROCHLORIDE SCH MG: 500 TABLET, EXTENDED RELEASE ORAL at 09:42

## 2017-04-22 RX ADMIN — LEVOTHYROXINE SODIUM SCH MG: 25 TABLET ORAL at 06:00

## 2017-04-22 RX ADMIN — ALLOPURINOL SCH MG: 300 TABLET ORAL at 09:43

## 2017-04-22 RX ADMIN — MONTELUKAST SODIUM SCH MG: 10 TABLET, FILM COATED ORAL at 20:12

## 2017-04-23 VITALS — SYSTOLIC BLOOD PRESSURE: 122 MMHG | DIASTOLIC BLOOD PRESSURE: 79 MMHG

## 2017-04-23 VITALS — SYSTOLIC BLOOD PRESSURE: 158 MMHG | DIASTOLIC BLOOD PRESSURE: 74 MMHG

## 2017-04-23 RX ADMIN — LABETALOL HCL SCH MG: 200 TABLET, FILM COATED ORAL at 08:01

## 2017-04-23 RX ADMIN — LEVOTHYROXINE SODIUM SCH MG: 25 TABLET ORAL at 06:02

## 2017-04-23 RX ADMIN — FERROUS SULFATE TAB 325 MG (65 MG ELEMENTAL FE) SCH MG: 325 (65 FE) TAB at 08:01

## 2017-04-23 RX ADMIN — METFORMIN HYDROCHLORIDE SCH MG: 500 TABLET, EXTENDED RELEASE ORAL at 17:21

## 2017-04-23 RX ADMIN — DULOXETINE HYDROCHLORIDE SCH MG: 30 CAPSULE, DELAYED RELEASE ORAL at 08:01

## 2017-04-23 RX ADMIN — METFORMIN HYDROCHLORIDE SCH MG: 500 TABLET, EXTENDED RELEASE ORAL at 08:01

## 2017-04-23 RX ADMIN — OXYCODONE HYDROCHLORIDE AND ACETAMINOPHEN SCH MG: 500 TABLET ORAL at 08:01

## 2017-04-23 RX ADMIN — OXYCODONE HYDROCHLORIDE AND ACETAMINOPHEN SCH MG: 500 TABLET ORAL at 15:25

## 2017-04-23 RX ADMIN — Medication SCH EA: at 00:01

## 2017-04-23 RX ADMIN — METHYLPHENIDATE HYDROCHLORIDE SCH MG: 5 TABLET ORAL at 08:01

## 2017-04-23 RX ADMIN — PANTOPRAZOLE SODIUM SCH MG: 40 TABLET, DELAYED RELEASE ORAL at 08:01

## 2017-04-23 RX ADMIN — MONTELUKAST SODIUM SCH MG: 10 TABLET, FILM COATED ORAL at 20:20

## 2017-04-23 RX ADMIN — OXYCODONE HYDROCHLORIDE AND ACETAMINOPHEN SCH MG: 500 TABLET ORAL at 20:20

## 2017-04-23 RX ADMIN — LABETALOL HCL SCH MG: 200 TABLET, FILM COATED ORAL at 20:20

## 2017-04-23 RX ADMIN — ROSUVASTATIN CALCIUM SCH MG: 10 TABLET, FILM COATED ORAL at 20:20

## 2017-04-23 RX ADMIN — DOCUSATE SODIUM SCH MG: 100 CAPSULE, LIQUID FILLED ORAL at 08:01

## 2017-04-23 RX ADMIN — ALLOPURINOL SCH MG: 300 TABLET ORAL at 08:01

## 2017-04-23 RX ADMIN — DOCUSATE SODIUM SCH MG: 100 CAPSULE, LIQUID FILLED ORAL at 20:19

## 2017-04-24 VITALS — DIASTOLIC BLOOD PRESSURE: 71 MMHG | SYSTOLIC BLOOD PRESSURE: 144 MMHG

## 2017-04-24 VITALS — SYSTOLIC BLOOD PRESSURE: 142 MMHG | DIASTOLIC BLOOD PRESSURE: 65 MMHG

## 2017-04-24 RX ADMIN — Medication SCH EA: at 00:01

## 2017-04-24 RX ADMIN — OXYCODONE HYDROCHLORIDE AND ACETAMINOPHEN SCH MG: 500 TABLET ORAL at 15:30

## 2017-04-24 RX ADMIN — PANTOPRAZOLE SODIUM SCH MG: 40 TABLET, DELAYED RELEASE ORAL at 09:44

## 2017-04-24 RX ADMIN — LISINOPRIL SCH MG: 5 TABLET ORAL at 18:40

## 2017-04-24 RX ADMIN — LABETALOL HCL SCH MG: 200 TABLET, FILM COATED ORAL at 09:53

## 2017-04-24 RX ADMIN — OXYCODONE HYDROCHLORIDE AND ACETAMINOPHEN SCH MG: 500 TABLET ORAL at 20:19

## 2017-04-24 RX ADMIN — OXYCODONE HYDROCHLORIDE AND ACETAMINOPHEN SCH MG: 500 TABLET ORAL at 09:44

## 2017-04-24 RX ADMIN — ALLOPURINOL SCH MG: 300 TABLET ORAL at 09:53

## 2017-04-24 RX ADMIN — ROSUVASTATIN CALCIUM SCH MG: 10 TABLET, FILM COATED ORAL at 20:20

## 2017-04-24 RX ADMIN — METFORMIN HYDROCHLORIDE SCH MG: 500 TABLET, EXTENDED RELEASE ORAL at 17:17

## 2017-04-24 RX ADMIN — DOCUSATE SODIUM SCH MG: 100 CAPSULE, LIQUID FILLED ORAL at 09:45

## 2017-04-24 RX ADMIN — DULOXETINE HYDROCHLORIDE SCH MG: 30 CAPSULE, DELAYED RELEASE ORAL at 09:44

## 2017-04-24 RX ADMIN — METHYLPHENIDATE HYDROCHLORIDE SCH MG: 5 TABLET ORAL at 09:45

## 2017-04-24 RX ADMIN — METFORMIN HYDROCHLORIDE SCH MG: 500 TABLET, EXTENDED RELEASE ORAL at 09:44

## 2017-04-24 RX ADMIN — LEVOTHYROXINE SODIUM SCH MG: 25 TABLET ORAL at 05:41

## 2017-04-24 RX ADMIN — LABETALOL HCL SCH MG: 200 TABLET, FILM COATED ORAL at 20:19

## 2017-04-24 RX ADMIN — DOCUSATE SODIUM SCH MG: 100 CAPSULE, LIQUID FILLED ORAL at 20:19

## 2017-04-24 RX ADMIN — FERROUS SULFATE TAB 325 MG (65 MG ELEMENTAL FE) SCH MG: 325 (65 FE) TAB at 09:45

## 2017-04-24 RX ADMIN — METHYLPHENIDATE HYDROCHLORIDE SCH MG: 5 TABLET ORAL at 13:42

## 2017-04-24 RX ADMIN — MONTELUKAST SODIUM SCH MG: 10 TABLET, FILM COATED ORAL at 20:19

## 2017-04-24 NOTE — IPN
DATE:   04/24/2017

 

Cindy Mena met with myself and staff today and spoke extensively about what

she did at home, her puzzles, her activities with her daughter, how she would

like to go home and how she was sorry that she was not able to go to [place] for

the last two francis.  This is the most extensively she has spoken.

 

MENTAL STATUS EXAMINATION:

Her speech was full.  Thought process was intact and logical.  No loose

associations.  No abnormal or psychotic thoughts.  Judgment and insight 
improved.

Fully oriented.  Recent and remote memory seemed intact.  Attention and

concentration improved.  Fund of knowledge full.  Mood appeared better.  Affect

appeared slightly brighter. 



 I have discontinued her Abilify and increased her

Ritalin to twice a day at 8:00 and 1:00 and her Cymbalta continues at 90 mg and

her Wellbutrin at 75 mg by mouth daily.  We will be contacting her daughter for

her assessment through this weekend.

 

DIAGNOSIS:  Major depressive illness.

MTDD

## 2017-04-25 VITALS — SYSTOLIC BLOOD PRESSURE: 101 MMHG | DIASTOLIC BLOOD PRESSURE: 55 MMHG

## 2017-04-25 VITALS — SYSTOLIC BLOOD PRESSURE: 127 MMHG | DIASTOLIC BLOOD PRESSURE: 59 MMHG

## 2017-04-25 RX ADMIN — ROSUVASTATIN CALCIUM SCH MG: 10 TABLET, FILM COATED ORAL at 21:09

## 2017-04-25 RX ADMIN — MONTELUKAST SODIUM SCH MG: 10 TABLET, FILM COATED ORAL at 21:09

## 2017-04-25 RX ADMIN — ALLOPURINOL SCH MG: 300 TABLET ORAL at 09:05

## 2017-04-25 RX ADMIN — DOCUSATE SODIUM SCH MG: 100 CAPSULE, LIQUID FILLED ORAL at 09:07

## 2017-04-25 RX ADMIN — DOCUSATE SODIUM SCH MG: 100 CAPSULE, LIQUID FILLED ORAL at 21:09

## 2017-04-25 RX ADMIN — LABETALOL HCL SCH MG: 200 TABLET, FILM COATED ORAL at 09:05

## 2017-04-25 RX ADMIN — LISINOPRIL SCH MG: 5 TABLET ORAL at 09:06

## 2017-04-25 RX ADMIN — PANTOPRAZOLE SODIUM SCH MG: 40 TABLET, DELAYED RELEASE ORAL at 09:06

## 2017-04-25 RX ADMIN — METHYLPHENIDATE HYDROCHLORIDE SCH MG: 5 TABLET ORAL at 12:14

## 2017-04-25 RX ADMIN — METFORMIN HYDROCHLORIDE SCH MG: 500 TABLET, EXTENDED RELEASE ORAL at 09:05

## 2017-04-25 RX ADMIN — DULOXETINE HYDROCHLORIDE SCH MG: 30 CAPSULE, DELAYED RELEASE ORAL at 09:05

## 2017-04-25 RX ADMIN — OXYCODONE HYDROCHLORIDE AND ACETAMINOPHEN SCH MG: 500 TABLET ORAL at 21:08

## 2017-04-25 RX ADMIN — METHYLPHENIDATE HYDROCHLORIDE SCH MG: 5 TABLET ORAL at 09:08

## 2017-04-25 RX ADMIN — OXYCODONE HYDROCHLORIDE AND ACETAMINOPHEN SCH MG: 500 TABLET ORAL at 16:12

## 2017-04-25 RX ADMIN — LABETALOL HCL SCH MG: 200 TABLET, FILM COATED ORAL at 21:00

## 2017-04-25 RX ADMIN — OXYCODONE HYDROCHLORIDE AND ACETAMINOPHEN SCH MG: 500 TABLET ORAL at 09:05

## 2017-04-25 RX ADMIN — METFORMIN HYDROCHLORIDE SCH MG: 500 TABLET, EXTENDED RELEASE ORAL at 17:00

## 2017-04-25 RX ADMIN — FERROUS SULFATE TAB 325 MG (65 MG ELEMENTAL FE) SCH MG: 325 (65 FE) TAB at 09:06

## 2017-04-25 RX ADMIN — LEVOTHYROXINE SODIUM SCH MG: 25 TABLET ORAL at 06:42

## 2017-04-25 NOTE — IPN
DATE:  04/25/2017

 

Though I met with Cindy yesterday and staff and she had spoken extensively

about what she did at home, her word search, her activities, and her daughter 
and

how she would like to go home and how she was sorry she had not been able to go

to [place] for the last two francis.  



Today she was in bed and less expansive in

her speech.  Her speech showed some poverty today.  Thought process was intact

and logical.  No loose associations. No abnormal or psychotic thoughts.  
Judgment

and insight improved.  Fully oriented.  Recent and remote memory seem intact.

Attention and concentration is fair.  Fund of knowledge is full.   Mood appeared

neutral.  Affect was not noticeably brighter.



  Continuing her on the same

medications which I have ordered with no change.  Difficult to determine today

whether in fact she is making progress.  If no change is seen we may have to be

looking at long term care.  I am wanting her daughter to give us some feedback.

Patient is presently on Ritalin 10 twice a day, Cymbalta 90 mg daily, bupropion

75 mg daily.  I have discontinued her Abilify and will observe.

 

DIAGNOSIS:

Major depressive illness.

MTDD

## 2017-04-26 VITALS — SYSTOLIC BLOOD PRESSURE: 121 MMHG | DIASTOLIC BLOOD PRESSURE: 56 MMHG

## 2017-04-26 RX ADMIN — LISINOPRIL SCH MG: 5 TABLET ORAL at 08:37

## 2017-04-26 RX ADMIN — LEVOTHYROXINE SODIUM SCH MG: 25 TABLET ORAL at 06:23

## 2017-04-26 RX ADMIN — ALLOPURINOL SCH MG: 300 TABLET ORAL at 08:33

## 2017-04-26 RX ADMIN — DOCUSATE SODIUM SCH MG: 100 CAPSULE, LIQUID FILLED ORAL at 20:32

## 2017-04-26 RX ADMIN — DULOXETINE HYDROCHLORIDE SCH MG: 30 CAPSULE, DELAYED RELEASE ORAL at 08:38

## 2017-04-26 RX ADMIN — FERROUS SULFATE TAB 325 MG (65 MG ELEMENTAL FE) SCH MG: 325 (65 FE) TAB at 08:37

## 2017-04-26 RX ADMIN — PANTOPRAZOLE SODIUM SCH MG: 40 TABLET, DELAYED RELEASE ORAL at 08:37

## 2017-04-26 RX ADMIN — METFORMIN HYDROCHLORIDE SCH MG: 500 TABLET, EXTENDED RELEASE ORAL at 17:17

## 2017-04-26 RX ADMIN — LABETALOL HCL SCH MG: 200 TABLET, FILM COATED ORAL at 08:36

## 2017-04-26 RX ADMIN — OXYCODONE HYDROCHLORIDE AND ACETAMINOPHEN SCH MG: 500 TABLET ORAL at 16:41

## 2017-04-26 RX ADMIN — METFORMIN HYDROCHLORIDE SCH MG: 500 TABLET, EXTENDED RELEASE ORAL at 08:33

## 2017-04-26 RX ADMIN — OXYCODONE HYDROCHLORIDE AND ACETAMINOPHEN SCH MG: 500 TABLET ORAL at 08:39

## 2017-04-26 RX ADMIN — METHYLPHENIDATE HYDROCHLORIDE SCH MG: 5 TABLET ORAL at 13:13

## 2017-04-26 RX ADMIN — MONTELUKAST SODIUM SCH MG: 10 TABLET, FILM COATED ORAL at 20:34

## 2017-04-26 RX ADMIN — LABETALOL HCL SCH MG: 200 TABLET, FILM COATED ORAL at 20:34

## 2017-04-26 RX ADMIN — OXYCODONE HYDROCHLORIDE AND ACETAMINOPHEN SCH MG: 500 TABLET ORAL at 20:33

## 2017-04-26 RX ADMIN — ROSUVASTATIN CALCIUM SCH MG: 10 TABLET, FILM COATED ORAL at 20:34

## 2017-04-26 RX ADMIN — METHYLPHENIDATE HYDROCHLORIDE SCH MG: 5 TABLET ORAL at 08:38

## 2017-04-26 RX ADMIN — DOCUSATE SODIUM SCH MG: 100 CAPSULE, LIQUID FILLED ORAL at 08:37

## 2017-04-26 NOTE — IPN
DATE:  04/26/2017

 

Notified yesterday that Cindy asked for a banana split.  She has been up and

about significant more than previously.  Subjectively, she is still sad. Her 
mood is  not particularly great and mostly "so-so".

Continuing on same medication.  I am wanting her daughter to give me feedback as

to patient's condition at baseline and if she sees improvement.  Patient is

presently on Cymbalta 90, bupropion 75 mg and Ritalin 10 twice a day.

 

DIAGNOSIS:

Major depression.

 

Her speech continues to still show some poverty.  Thought process intact and

logical.  No loose associations.  No abnormal or psychotic thoughts.  Judgment

and insight are improved.  Fully oriented.  Recent and remote memory seem 
intact.

Attention and concentration is fair.  Fund of knowledge is full.  Mood appeared

neutral.  Affect was notably brighter.

 

DIAGNOSIS:

Major depressive illness.

MTDD

## 2017-04-27 VITALS — SYSTOLIC BLOOD PRESSURE: 138 MMHG | DIASTOLIC BLOOD PRESSURE: 80 MMHG

## 2017-04-27 VITALS — DIASTOLIC BLOOD PRESSURE: 63 MMHG | SYSTOLIC BLOOD PRESSURE: 144 MMHG

## 2017-04-27 RX ADMIN — DOCUSATE SODIUM SCH MG: 100 CAPSULE, LIQUID FILLED ORAL at 09:44

## 2017-04-27 RX ADMIN — LABETALOL HCL SCH MG: 200 TABLET, FILM COATED ORAL at 20:12

## 2017-04-27 RX ADMIN — MONTELUKAST SODIUM SCH MG: 10 TABLET, FILM COATED ORAL at 20:12

## 2017-04-27 RX ADMIN — PANTOPRAZOLE SODIUM SCH MG: 40 TABLET, DELAYED RELEASE ORAL at 09:46

## 2017-04-27 RX ADMIN — METHYLPHENIDATE HYDROCHLORIDE SCH MG: 5 TABLET ORAL at 13:17

## 2017-04-27 RX ADMIN — DOCUSATE SODIUM SCH MG: 100 CAPSULE, LIQUID FILLED ORAL at 20:12

## 2017-04-27 RX ADMIN — ALLOPURINOL SCH MG: 300 TABLET ORAL at 09:46

## 2017-04-27 RX ADMIN — METFORMIN HYDROCHLORIDE SCH MG: 500 TABLET, EXTENDED RELEASE ORAL at 09:46

## 2017-04-27 RX ADMIN — DULOXETINE HYDROCHLORIDE SCH MG: 30 CAPSULE, DELAYED RELEASE ORAL at 09:45

## 2017-04-27 RX ADMIN — OXYCODONE HYDROCHLORIDE AND ACETAMINOPHEN SCH MG: 500 TABLET ORAL at 09:47

## 2017-04-27 RX ADMIN — LABETALOL HCL SCH MG: 200 TABLET, FILM COATED ORAL at 09:44

## 2017-04-27 RX ADMIN — OXYCODONE HYDROCHLORIDE AND ACETAMINOPHEN SCH MG: 500 TABLET ORAL at 15:14

## 2017-04-27 RX ADMIN — ROSUVASTATIN CALCIUM SCH MG: 10 TABLET, FILM COATED ORAL at 20:13

## 2017-04-27 RX ADMIN — METHYLPHENIDATE HYDROCHLORIDE SCH MG: 5 TABLET ORAL at 09:47

## 2017-04-27 RX ADMIN — LEVOTHYROXINE SODIUM SCH MG: 25 TABLET ORAL at 06:01

## 2017-04-27 RX ADMIN — LISINOPRIL SCH MG: 5 TABLET ORAL at 09:48

## 2017-04-27 RX ADMIN — METFORMIN HYDROCHLORIDE SCH MG: 500 TABLET, EXTENDED RELEASE ORAL at 18:02

## 2017-04-27 RX ADMIN — FERROUS SULFATE TAB 325 MG (65 MG ELEMENTAL FE) SCH MG: 325 (65 FE) TAB at 09:46

## 2017-04-27 RX ADMIN — OXYCODONE HYDROCHLORIDE AND ACETAMINOPHEN SCH MG: 500 TABLET ORAL at 20:12

## 2017-04-27 NOTE — IPN
DATE: 04/27/2017

 

No significant change today. She has not been up and about in any impressive

amount today. Subjectively, she still quotes "so-so." Her mood is not

particularly great. She would like to go home. She says her daughter has visited.

I am awaiting feedback from her daughter and have been unable to get it. The

patient is presently on Cymbalta 90, bupropion 75 mg, and Ritalin 10 mg twice a

day.

 

MENTAL STATUS EXAMINATION: Her speech continues to still show some poverty.

Thought processes intact and logical. No loose associations. No abnormal or

psychotic thoughts. Insight and judgment are improved. She is fully oriented.

Recent and remote memory seem intact. Attention and concentration are fair. Fund

of knowledge is full. Mood appeared neutral. Affect is still somewhat flat.

 

DIAGNOSIS:

Major depressive illness.

## 2017-04-28 VITALS — DIASTOLIC BLOOD PRESSURE: 66 MMHG | SYSTOLIC BLOOD PRESSURE: 143 MMHG

## 2017-04-28 VITALS — SYSTOLIC BLOOD PRESSURE: 146 MMHG | DIASTOLIC BLOOD PRESSURE: 70 MMHG

## 2017-04-28 RX ADMIN — DOCUSATE SODIUM SCH MG: 100 CAPSULE, LIQUID FILLED ORAL at 09:41

## 2017-04-28 RX ADMIN — MONTELUKAST SODIUM SCH MG: 10 TABLET, FILM COATED ORAL at 20:18

## 2017-04-28 RX ADMIN — FERROUS SULFATE TAB 325 MG (65 MG ELEMENTAL FE) SCH MG: 325 (65 FE) TAB at 09:40

## 2017-04-28 RX ADMIN — PANTOPRAZOLE SODIUM SCH MG: 40 TABLET, DELAYED RELEASE ORAL at 09:41

## 2017-04-28 RX ADMIN — METFORMIN HYDROCHLORIDE SCH MG: 500 TABLET, EXTENDED RELEASE ORAL at 17:31

## 2017-04-28 RX ADMIN — ALLOPURINOL SCH MG: 300 TABLET ORAL at 09:41

## 2017-04-28 RX ADMIN — METHYLPHENIDATE HYDROCHLORIDE SCH MG: 5 TABLET ORAL at 09:40

## 2017-04-28 RX ADMIN — OXYCODONE HYDROCHLORIDE AND ACETAMINOPHEN SCH MG: 500 TABLET ORAL at 09:41

## 2017-04-28 RX ADMIN — LABETALOL HCL SCH MG: 200 TABLET, FILM COATED ORAL at 20:17

## 2017-04-28 RX ADMIN — DULOXETINE HYDROCHLORIDE SCH MG: 30 CAPSULE, DELAYED RELEASE ORAL at 09:41

## 2017-04-28 RX ADMIN — ROSUVASTATIN CALCIUM SCH MG: 10 TABLET, FILM COATED ORAL at 20:17

## 2017-04-28 RX ADMIN — METHYLPHENIDATE HYDROCHLORIDE SCH MG: 5 TABLET ORAL at 13:06

## 2017-04-28 RX ADMIN — METFORMIN HYDROCHLORIDE SCH MG: 500 TABLET, EXTENDED RELEASE ORAL at 09:40

## 2017-04-28 RX ADMIN — OXYCODONE HYDROCHLORIDE AND ACETAMINOPHEN SCH MG: 500 TABLET ORAL at 16:10

## 2017-04-28 RX ADMIN — OXYCODONE HYDROCHLORIDE AND ACETAMINOPHEN SCH MG: 500 TABLET ORAL at 20:18

## 2017-04-28 RX ADMIN — LABETALOL HCL SCH MG: 200 TABLET, FILM COATED ORAL at 09:42

## 2017-04-28 RX ADMIN — LISINOPRIL SCH MG: 5 TABLET ORAL at 09:40

## 2017-04-28 RX ADMIN — LEVOTHYROXINE SODIUM SCH MG: 25 TABLET ORAL at 06:01

## 2017-04-28 RX ADMIN — DOCUSATE SODIUM SCH MG: 100 CAPSULE, LIQUID FILLED ORAL at 20:18

## 2017-04-28 NOTE — IPN
DATE: 04/28/2017

 

Cindy Mena's roommate reports this morning as I spoke with Cindy that

Cindy walked around a great deal yesterday and sat in the lounge. Cindy

seemed more awake and out of bed. She continues to still be noncommittal

concerning her mood improvement but her actions do certainly suggest an

improvement in mood and energy. She has had no side effects of medication.

 

MENTAL STATUS EXAMINATION: Speech is normal. No disturbance of thought processes.

No loose associations. No psychotic thoughts. Insight and judgment are poor.

Orientation in three spheres is present. Recent and remote memory intact.

Attention and concentration improved. Language improved. Full fund of knowledge.

Mood is better. Affect is brighter.

 

IMPRESSION: Major depressive illness.

 

PRESENT MEDICATIONS: Include:

- bupropion 75 mg daily

- Cymbalta 90 mg daily

- methylphenidate 10 mg twice a day at 8:00 and 1:00

## 2017-04-29 VITALS — DIASTOLIC BLOOD PRESSURE: 55 MMHG | SYSTOLIC BLOOD PRESSURE: 118 MMHG

## 2017-04-29 VITALS — DIASTOLIC BLOOD PRESSURE: 59 MMHG | SYSTOLIC BLOOD PRESSURE: 104 MMHG

## 2017-04-29 RX ADMIN — FERROUS SULFATE TAB 325 MG (65 MG ELEMENTAL FE) SCH MG: 325 (65 FE) TAB at 09:17

## 2017-04-29 RX ADMIN — OXYCODONE HYDROCHLORIDE AND ACETAMINOPHEN SCH MG: 500 TABLET ORAL at 09:18

## 2017-04-29 RX ADMIN — OXYCODONE HYDROCHLORIDE AND ACETAMINOPHEN SCH MG: 500 TABLET ORAL at 20:49

## 2017-04-29 RX ADMIN — METHYLPHENIDATE HYDROCHLORIDE SCH MG: 5 TABLET ORAL at 09:18

## 2017-04-29 RX ADMIN — OXYCODONE HYDROCHLORIDE AND ACETAMINOPHEN SCH MG: 500 TABLET ORAL at 16:44

## 2017-04-29 RX ADMIN — ROSUVASTATIN CALCIUM SCH MG: 10 TABLET, FILM COATED ORAL at 20:49

## 2017-04-29 RX ADMIN — METFORMIN HYDROCHLORIDE SCH MG: 500 TABLET, EXTENDED RELEASE ORAL at 09:18

## 2017-04-29 RX ADMIN — LEVOTHYROXINE SODIUM SCH MG: 25 TABLET ORAL at 05:54

## 2017-04-29 RX ADMIN — LABETALOL HCL SCH MG: 200 TABLET, FILM COATED ORAL at 09:18

## 2017-04-29 RX ADMIN — ALLOPURINOL SCH MG: 300 TABLET ORAL at 09:17

## 2017-04-29 RX ADMIN — DOCUSATE SODIUM SCH MG: 100 CAPSULE, LIQUID FILLED ORAL at 20:49

## 2017-04-29 RX ADMIN — METHYLPHENIDATE HYDROCHLORIDE SCH MG: 5 TABLET ORAL at 14:17

## 2017-04-29 RX ADMIN — LABETALOL HCL SCH MG: 200 TABLET, FILM COATED ORAL at 20:49

## 2017-04-29 RX ADMIN — DOCUSATE SODIUM SCH MG: 100 CAPSULE, LIQUID FILLED ORAL at 09:18

## 2017-04-29 RX ADMIN — PANTOPRAZOLE SODIUM SCH MG: 40 TABLET, DELAYED RELEASE ORAL at 09:18

## 2017-04-29 RX ADMIN — METFORMIN HYDROCHLORIDE SCH MG: 500 TABLET, EXTENDED RELEASE ORAL at 16:44

## 2017-04-29 RX ADMIN — MONTELUKAST SODIUM SCH MG: 10 TABLET, FILM COATED ORAL at 20:49

## 2017-04-29 RX ADMIN — DULOXETINE HYDROCHLORIDE SCH MG: 30 CAPSULE, DELAYED RELEASE ORAL at 09:18

## 2017-04-29 RX ADMIN — LISINOPRIL SCH MG: 5 TABLET ORAL at 09:19

## 2017-04-30 VITALS — SYSTOLIC BLOOD PRESSURE: 145 MMHG | DIASTOLIC BLOOD PRESSURE: 62 MMHG

## 2017-04-30 VITALS — SYSTOLIC BLOOD PRESSURE: 156 MMHG | DIASTOLIC BLOOD PRESSURE: 59 MMHG

## 2017-04-30 RX ADMIN — PANTOPRAZOLE SODIUM SCH MG: 40 TABLET, DELAYED RELEASE ORAL at 09:18

## 2017-04-30 RX ADMIN — FERROUS SULFATE TAB 325 MG (65 MG ELEMENTAL FE) SCH MG: 325 (65 FE) TAB at 09:19

## 2017-04-30 RX ADMIN — DOCUSATE SODIUM SCH MG: 100 CAPSULE, LIQUID FILLED ORAL at 20:18

## 2017-04-30 RX ADMIN — METFORMIN HYDROCHLORIDE SCH MG: 500 TABLET, EXTENDED RELEASE ORAL at 16:12

## 2017-04-30 RX ADMIN — DULOXETINE HYDROCHLORIDE SCH MG: 30 CAPSULE, DELAYED RELEASE ORAL at 09:19

## 2017-04-30 RX ADMIN — OXYCODONE HYDROCHLORIDE AND ACETAMINOPHEN SCH MG: 500 TABLET ORAL at 16:12

## 2017-04-30 RX ADMIN — OXYCODONE HYDROCHLORIDE AND ACETAMINOPHEN SCH MG: 500 TABLET ORAL at 20:19

## 2017-04-30 RX ADMIN — METHYLPHENIDATE HYDROCHLORIDE SCH MG: 5 TABLET ORAL at 09:19

## 2017-04-30 RX ADMIN — LEVOTHYROXINE SODIUM SCH MG: 25 TABLET ORAL at 06:01

## 2017-04-30 RX ADMIN — LISINOPRIL SCH MG: 5 TABLET ORAL at 09:19

## 2017-04-30 RX ADMIN — LABETALOL HCL SCH MG: 200 TABLET, FILM COATED ORAL at 20:19

## 2017-04-30 RX ADMIN — DOCUSATE SODIUM SCH MG: 100 CAPSULE, LIQUID FILLED ORAL at 09:18

## 2017-04-30 RX ADMIN — ALLOPURINOL SCH MG: 300 TABLET ORAL at 09:19

## 2017-04-30 RX ADMIN — OXYCODONE HYDROCHLORIDE AND ACETAMINOPHEN SCH MG: 500 TABLET ORAL at 09:19

## 2017-04-30 RX ADMIN — ROSUVASTATIN CALCIUM SCH MG: 10 TABLET, FILM COATED ORAL at 20:20

## 2017-04-30 RX ADMIN — METHYLPHENIDATE HYDROCHLORIDE SCH MG: 5 TABLET ORAL at 14:23

## 2017-04-30 RX ADMIN — METFORMIN HYDROCHLORIDE SCH MG: 500 TABLET, EXTENDED RELEASE ORAL at 09:20

## 2017-04-30 RX ADMIN — MONTELUKAST SODIUM SCH MG: 10 TABLET, FILM COATED ORAL at 20:18

## 2017-04-30 RX ADMIN — LABETALOL HCL SCH MG: 200 TABLET, FILM COATED ORAL at 09:19

## 2017-04-30 NOTE — IPN
DATE:   04/30/2017

 

I met with Cindy Mena today.  She is walking the halls.  She is awake.  She

is eating.  As always, she gives a rather neutral assessment of her mood, but her

affect is significantly brighter and we will discuss with her daughter whether

she thinks that she is ready to go home and what her outpatient plan would be.

No change in medication today.

 

MENTAL STATUS EXAMINATION:

Speech is normal.  Thought process is somewhat of a poverty of thought.  No loose

associations.  Abnormal psychotic thoughts do not exist.  Judgment and insight

seem improved.  Orientation is full in three spheres.  Recent and remote memory

intact.  Attention and concentration intact.  No disturbance of language.  Full

fund of knowledge.  Mood is improved.  Affect is brighter.

 

IMPRESSION:

1.  Major depressive illness.

## 2017-05-01 VITALS — SYSTOLIC BLOOD PRESSURE: 136 MMHG | DIASTOLIC BLOOD PRESSURE: 70 MMHG

## 2017-05-01 VITALS — DIASTOLIC BLOOD PRESSURE: 56 MMHG | SYSTOLIC BLOOD PRESSURE: 118 MMHG

## 2017-05-01 RX ADMIN — PANTOPRAZOLE SODIUM SCH MG: 40 TABLET, DELAYED RELEASE ORAL at 08:22

## 2017-05-01 RX ADMIN — METHYLPHENIDATE HYDROCHLORIDE SCH MG: 5 TABLET ORAL at 08:22

## 2017-05-01 RX ADMIN — OXYCODONE HYDROCHLORIDE AND ACETAMINOPHEN SCH MG: 500 TABLET ORAL at 08:23

## 2017-05-01 RX ADMIN — DULOXETINE HYDROCHLORIDE SCH MG: 30 CAPSULE, DELAYED RELEASE ORAL at 08:23

## 2017-05-01 RX ADMIN — METFORMIN HYDROCHLORIDE SCH MG: 500 TABLET, EXTENDED RELEASE ORAL at 17:07

## 2017-05-01 RX ADMIN — METFORMIN HYDROCHLORIDE SCH MG: 500 TABLET, EXTENDED RELEASE ORAL at 08:22

## 2017-05-01 RX ADMIN — MONTELUKAST SODIUM SCH MG: 10 TABLET, FILM COATED ORAL at 20:36

## 2017-05-01 RX ADMIN — METHYLPHENIDATE HYDROCHLORIDE SCH MG: 5 TABLET ORAL at 13:28

## 2017-05-01 RX ADMIN — OXYCODONE HYDROCHLORIDE AND ACETAMINOPHEN SCH MG: 500 TABLET ORAL at 15:28

## 2017-05-01 RX ADMIN — OXYCODONE HYDROCHLORIDE AND ACETAMINOPHEN SCH MG: 500 TABLET ORAL at 20:35

## 2017-05-01 RX ADMIN — LISINOPRIL SCH MG: 5 TABLET ORAL at 08:23

## 2017-05-01 RX ADMIN — LABETALOL HCL SCH MG: 200 TABLET, FILM COATED ORAL at 08:23

## 2017-05-01 RX ADMIN — FERROUS SULFATE TAB 325 MG (65 MG ELEMENTAL FE) SCH MG: 325 (65 FE) TAB at 08:22

## 2017-05-01 RX ADMIN — LEVOTHYROXINE SODIUM SCH MG: 25 TABLET ORAL at 06:18

## 2017-05-01 RX ADMIN — ALLOPURINOL SCH MG: 300 TABLET ORAL at 08:22

## 2017-05-01 RX ADMIN — LABETALOL HCL SCH MG: 200 TABLET, FILM COATED ORAL at 20:35

## 2017-05-01 RX ADMIN — DOCUSATE SODIUM SCH MG: 100 CAPSULE, LIQUID FILLED ORAL at 20:35

## 2017-05-01 RX ADMIN — DOCUSATE SODIUM SCH MG: 100 CAPSULE, LIQUID FILLED ORAL at 08:22

## 2017-05-01 RX ADMIN — ROSUVASTATIN CALCIUM SCH MG: 10 TABLET, FILM COATED ORAL at 20:34

## 2017-05-01 NOTE — IPN
DATE:  05/01/2017

 

Cindy Mena met with myself, her daughter and discharge planning.  Her

daughter felt that if Cindy wanted to go home it was alright, but in discussing

the situation it was clear that Cindy would be living alone at home as the

daughter worked and that Cindy had a history of not taking her medication or

lying about taking her medications.  Generally, we felt that she had improved

approximately 60 to 70% in mood, but the daughter was leery, as was I in terms of

confident that this woman could go home and take care of herself as she did

previously.  For that reason, we are not considering discharging this patient and

she has been lined up for possible long-term care at [place].  The patient

understood this.  Her speech was normal.  Thought process intact.  No loose

associations.  No abnormal or psychotic thoughts.  Judgment and insight were

fair.  She was fully oriented.  Recent and remote memory mildly intact.

Difficult with attention and concentration.  Mood was still low and affect was

still flat.  Energy was still decreased.  The patient is still not actively

contributing on the unit and needed encouragement but significantly improved over

her admission.

 

DIAGNOSIS:

1.  Major depressive illness.  No change in medications at this time.

## 2017-05-02 VITALS — DIASTOLIC BLOOD PRESSURE: 73 MMHG | SYSTOLIC BLOOD PRESSURE: 159 MMHG

## 2017-05-02 VITALS — DIASTOLIC BLOOD PRESSURE: 57 MMHG | SYSTOLIC BLOOD PRESSURE: 113 MMHG

## 2017-05-02 RX ADMIN — OXYCODONE HYDROCHLORIDE AND ACETAMINOPHEN SCH MG: 500 TABLET ORAL at 15:50

## 2017-05-02 RX ADMIN — LABETALOL HCL SCH MG: 200 TABLET, FILM COATED ORAL at 08:38

## 2017-05-02 RX ADMIN — FERROUS SULFATE TAB 325 MG (65 MG ELEMENTAL FE) SCH MG: 325 (65 FE) TAB at 08:38

## 2017-05-02 RX ADMIN — MONTELUKAST SODIUM SCH MG: 10 TABLET, FILM COATED ORAL at 20:38

## 2017-05-02 RX ADMIN — LABETALOL HCL SCH MG: 200 TABLET, FILM COATED ORAL at 20:40

## 2017-05-02 RX ADMIN — DOCUSATE SODIUM SCH MG: 100 CAPSULE, LIQUID FILLED ORAL at 08:38

## 2017-05-02 RX ADMIN — OXYCODONE HYDROCHLORIDE AND ACETAMINOPHEN SCH MG: 500 TABLET ORAL at 21:24

## 2017-05-02 RX ADMIN — METFORMIN HYDROCHLORIDE SCH MG: 500 TABLET, EXTENDED RELEASE ORAL at 17:05

## 2017-05-02 RX ADMIN — ROSUVASTATIN CALCIUM SCH MG: 10 TABLET, FILM COATED ORAL at 20:39

## 2017-05-02 RX ADMIN — LEVOTHYROXINE SODIUM SCH MG: 25 TABLET ORAL at 06:25

## 2017-05-02 RX ADMIN — OXYCODONE HYDROCHLORIDE AND ACETAMINOPHEN SCH MG: 500 TABLET ORAL at 08:39

## 2017-05-02 RX ADMIN — DULOXETINE HYDROCHLORIDE SCH MG: 30 CAPSULE, DELAYED RELEASE ORAL at 08:44

## 2017-05-02 RX ADMIN — METHYLPHENIDATE HYDROCHLORIDE SCH MG: 5 TABLET ORAL at 13:19

## 2017-05-02 RX ADMIN — ALLOPURINOL SCH MG: 300 TABLET ORAL at 08:39

## 2017-05-02 RX ADMIN — METFORMIN HYDROCHLORIDE SCH MG: 500 TABLET, EXTENDED RELEASE ORAL at 08:38

## 2017-05-02 RX ADMIN — PANTOPRAZOLE SODIUM SCH MG: 40 TABLET, DELAYED RELEASE ORAL at 08:38

## 2017-05-02 RX ADMIN — METHYLPHENIDATE HYDROCHLORIDE SCH MG: 5 TABLET ORAL at 08:38

## 2017-05-02 RX ADMIN — DOCUSATE SODIUM SCH MG: 100 CAPSULE, LIQUID FILLED ORAL at 20:38

## 2017-05-02 RX ADMIN — LISINOPRIL SCH MG: 5 TABLET ORAL at 08:37

## 2017-05-02 NOTE — IPN
DATE:  05/02/2017

 

I met with Cindy Mena and her daughter yesterday.  I observed Cindy Mena through the evening.  Although she has made maybe a 60 to 70%

improvement, we are still concerned she will not be able to take care of herself

at home in her present condition.  We are beginning to look using long-term care

as a backup option.  She has tolerated her medicine well and may make some

increases since she is seeming to have no significant side effects.  We will

increase duloxetine to 120 mg.  Literature is questionable on whether that is a

more effective treatment, but I am concerned that increasing Wellbutrin in a

geriatric patient may cause us further difficulties.  Speech is slow.  Thought

process is some poverty.  No loose associations.  No abnormal psychotic thoughts.

Judgment and insight are good.  Fully oriented.  Recent and remote memory intact.

Attention and concentration are good.  Language is good.  Full fund of knowledge.

Mood is still slightly low and affect is still flat.

## 2017-05-03 VITALS — DIASTOLIC BLOOD PRESSURE: 61 MMHG | SYSTOLIC BLOOD PRESSURE: 114 MMHG

## 2017-05-03 VITALS — SYSTOLIC BLOOD PRESSURE: 102 MMHG | DIASTOLIC BLOOD PRESSURE: 55 MMHG

## 2017-05-03 RX ADMIN — METFORMIN HYDROCHLORIDE SCH MG: 500 TABLET, EXTENDED RELEASE ORAL at 08:51

## 2017-05-03 RX ADMIN — OXYCODONE HYDROCHLORIDE AND ACETAMINOPHEN SCH MG: 500 TABLET ORAL at 08:51

## 2017-05-03 RX ADMIN — PANTOPRAZOLE SODIUM SCH MG: 40 TABLET, DELAYED RELEASE ORAL at 08:50

## 2017-05-03 RX ADMIN — LISINOPRIL SCH MG: 5 TABLET ORAL at 08:50

## 2017-05-03 RX ADMIN — LABETALOL HCL SCH MG: 200 TABLET, FILM COATED ORAL at 08:51

## 2017-05-03 RX ADMIN — DOCUSATE SODIUM SCH MG: 100 CAPSULE, LIQUID FILLED ORAL at 20:15

## 2017-05-03 RX ADMIN — LABETALOL HCL SCH MG: 200 TABLET, FILM COATED ORAL at 20:16

## 2017-05-03 RX ADMIN — METHYLPHENIDATE HYDROCHLORIDE SCH MG: 5 TABLET ORAL at 13:06

## 2017-05-03 RX ADMIN — DOCUSATE SODIUM SCH MG: 100 CAPSULE, LIQUID FILLED ORAL at 08:50

## 2017-05-03 RX ADMIN — METHYLPHENIDATE HYDROCHLORIDE SCH MG: 5 TABLET ORAL at 08:50

## 2017-05-03 RX ADMIN — MONTELUKAST SODIUM SCH MG: 10 TABLET, FILM COATED ORAL at 20:16

## 2017-05-03 RX ADMIN — DULOXETINE HYDROCHLORIDE SCH MG: 30 CAPSULE, DELAYED RELEASE ORAL at 08:49

## 2017-05-03 RX ADMIN — FERROUS SULFATE TAB 325 MG (65 MG ELEMENTAL FE) SCH MG: 325 (65 FE) TAB at 08:51

## 2017-05-03 RX ADMIN — OXYCODONE HYDROCHLORIDE AND ACETAMINOPHEN SCH MG: 500 TABLET ORAL at 20:15

## 2017-05-03 RX ADMIN — ROSUVASTATIN CALCIUM SCH MG: 10 TABLET, FILM COATED ORAL at 20:16

## 2017-05-03 RX ADMIN — LEVOTHYROXINE SODIUM SCH MG: 25 TABLET ORAL at 05:34

## 2017-05-03 RX ADMIN — ALLOPURINOL SCH MG: 300 TABLET ORAL at 08:50

## 2017-05-03 RX ADMIN — METFORMIN HYDROCHLORIDE SCH MG: 500 TABLET, EXTENDED RELEASE ORAL at 17:16

## 2017-05-03 RX ADMIN — OXYCODONE HYDROCHLORIDE AND ACETAMINOPHEN SCH MG: 500 TABLET ORAL at 16:06

## 2017-05-04 VITALS — SYSTOLIC BLOOD PRESSURE: 145 MMHG | DIASTOLIC BLOOD PRESSURE: 67 MMHG

## 2017-05-04 VITALS — SYSTOLIC BLOOD PRESSURE: 140 MMHG | DIASTOLIC BLOOD PRESSURE: 70 MMHG

## 2017-05-04 RX ADMIN — LISINOPRIL SCH MG: 5 TABLET ORAL at 08:45

## 2017-05-04 RX ADMIN — LABETALOL HCL SCH MG: 200 TABLET, FILM COATED ORAL at 20:39

## 2017-05-04 RX ADMIN — LABETALOL HCL SCH MG: 200 TABLET, FILM COATED ORAL at 08:44

## 2017-05-04 RX ADMIN — DOCUSATE SODIUM SCH MG: 100 CAPSULE, LIQUID FILLED ORAL at 20:39

## 2017-05-04 RX ADMIN — DOCUSATE SODIUM SCH MG: 100 CAPSULE, LIQUID FILLED ORAL at 08:44

## 2017-05-04 RX ADMIN — PANTOPRAZOLE SODIUM SCH MG: 40 TABLET, DELAYED RELEASE ORAL at 08:45

## 2017-05-04 RX ADMIN — METFORMIN HYDROCHLORIDE SCH MG: 500 TABLET, EXTENDED RELEASE ORAL at 17:28

## 2017-05-04 RX ADMIN — METFORMIN HYDROCHLORIDE SCH MG: 500 TABLET, EXTENDED RELEASE ORAL at 08:45

## 2017-05-04 RX ADMIN — FERROUS SULFATE TAB 325 MG (65 MG ELEMENTAL FE) SCH MG: 325 (65 FE) TAB at 08:44

## 2017-05-04 RX ADMIN — MONTELUKAST SODIUM SCH MG: 10 TABLET, FILM COATED ORAL at 20:39

## 2017-05-04 RX ADMIN — OXYCODONE HYDROCHLORIDE AND ACETAMINOPHEN SCH MG: 500 TABLET ORAL at 08:44

## 2017-05-04 RX ADMIN — LEVOTHYROXINE SODIUM SCH MG: 25 TABLET ORAL at 05:43

## 2017-05-04 RX ADMIN — METHYLPHENIDATE HYDROCHLORIDE SCH MG: 5 TABLET ORAL at 12:54

## 2017-05-04 RX ADMIN — METHYLPHENIDATE HYDROCHLORIDE SCH MG: 5 TABLET ORAL at 08:45

## 2017-05-04 RX ADMIN — ALLOPURINOL SCH MG: 300 TABLET ORAL at 08:44

## 2017-05-04 RX ADMIN — ROSUVASTATIN CALCIUM SCH MG: 10 TABLET, FILM COATED ORAL at 20:39

## 2017-05-04 RX ADMIN — OXYCODONE HYDROCHLORIDE AND ACETAMINOPHEN SCH MG: 500 TABLET ORAL at 15:50

## 2017-05-04 RX ADMIN — DULOXETINE HYDROCHLORIDE SCH MG: 30 CAPSULE, DELAYED RELEASE ORAL at 08:44

## 2017-05-04 RX ADMIN — OXYCODONE HYDROCHLORIDE AND ACETAMINOPHEN SCH MG: 500 TABLET ORAL at 20:38

## 2017-05-04 NOTE — IPN
DATE:  05/04/2017

 

I met with Cindy Mena this morning.  She continues as she does every day to

say she is a little better.  Her sense of humor seems to be improving.  We

consider that she has made a 60-70% improvement, but the main issue is whether

she will be able to take care of herself at home because she lives alone.  Her

daughter works and Ms. Mena does not have 24-hour care.  She has history of

not being compliant with her medications recently and telling her daughter that

she has taken them when she hasn't. Assisted living may be the one option.  
Long term care process has been initiated.

She tolerates her medications well with no significant side effects.

MENTAL STATUS:  Speech is mildly slow.  Thought process still shows some poverty
,

but when patient is engaged in a significant conversation she actually is able 
to

discuss things in more detail.  No loose associations.  No abnormal psychotic

thoughts.  Judgment and insight are good.  Fully oriented.  Recent and remote

memory intact.  Attention and concentration are good.  Language is good.  Full

fund of knowledge.  Mood is still slightly low.  Affect is still slightly flat.

 

IMPRESSION:  Major depressive illness.

MTDD

## 2017-05-04 NOTE — IPN
DATE:    05/03/2017

 

Cindy Mena continues to still have medium to low grade depression.  She has

certainly shown some improvement.  I have increased her Cymbalta to 120 mg 
daily.

She seems to be tolerating it well.  I have added that dose to her bupropion 75

mg daily and to her methylphenidate 10 mg twice a day.  Her appetite is good.

 

Long-term planning has been initiated, but hopefully may be avoided if staff and

her daughter feel that she will be able to function at home or if discharge

planning can get associated services to make sure that the patient takes her

medication and is safe at home. Assisted Living should be considered. This was 
discussed in detail with the staff

yesterday.  Speech is normal. Thought processes still slightly slow.  No loose

associations.  No abnormal or psychotic thoughts.  Judgment and insight are 
good.

She is fully oriented.  Recent and remote memory intact.  Attention and

concentration are good.  No disturbance of language.  She has a full fund of

knowledge.  Mood is improving.  Affect is bright.

 

The patient appears to be more active.  It is a question of her safety at home

where she lives alone.

EMIL

## 2017-05-05 VITALS — SYSTOLIC BLOOD PRESSURE: 124 MMHG | DIASTOLIC BLOOD PRESSURE: 70 MMHG

## 2017-05-05 VITALS — DIASTOLIC BLOOD PRESSURE: 63 MMHG | SYSTOLIC BLOOD PRESSURE: 141 MMHG

## 2017-05-05 RX ADMIN — LISINOPRIL SCH MG: 5 TABLET ORAL at 08:50

## 2017-05-05 RX ADMIN — ALLOPURINOL SCH MG: 300 TABLET ORAL at 08:51

## 2017-05-05 RX ADMIN — METHYLPHENIDATE HYDROCHLORIDE SCH MG: 5 TABLET ORAL at 08:50

## 2017-05-05 RX ADMIN — MONTELUKAST SODIUM SCH MG: 10 TABLET, FILM COATED ORAL at 20:32

## 2017-05-05 RX ADMIN — DOCUSATE SODIUM SCH MG: 100 CAPSULE, LIQUID FILLED ORAL at 20:31

## 2017-05-05 RX ADMIN — METFORMIN HYDROCHLORIDE SCH MG: 500 TABLET, EXTENDED RELEASE ORAL at 17:06

## 2017-05-05 RX ADMIN — FERROUS SULFATE TAB 325 MG (65 MG ELEMENTAL FE) SCH MG: 325 (65 FE) TAB at 08:51

## 2017-05-05 RX ADMIN — OXYCODONE HYDROCHLORIDE AND ACETAMINOPHEN SCH MG: 500 TABLET ORAL at 20:32

## 2017-05-05 RX ADMIN — METHYLPHENIDATE HYDROCHLORIDE SCH MG: 5 TABLET ORAL at 12:47

## 2017-05-05 RX ADMIN — LABETALOL HCL SCH MG: 200 TABLET, FILM COATED ORAL at 08:51

## 2017-05-05 RX ADMIN — DULOXETINE HYDROCHLORIDE SCH MG: 30 CAPSULE, DELAYED RELEASE ORAL at 08:51

## 2017-05-05 RX ADMIN — ROSUVASTATIN CALCIUM SCH MG: 10 TABLET, FILM COATED ORAL at 20:32

## 2017-05-05 RX ADMIN — OXYCODONE HYDROCHLORIDE AND ACETAMINOPHEN SCH MG: 500 TABLET ORAL at 15:55

## 2017-05-05 RX ADMIN — OXYCODONE HYDROCHLORIDE AND ACETAMINOPHEN SCH MG: 500 TABLET ORAL at 08:51

## 2017-05-05 RX ADMIN — METFORMIN HYDROCHLORIDE SCH MG: 500 TABLET, EXTENDED RELEASE ORAL at 08:51

## 2017-05-05 RX ADMIN — LEVOTHYROXINE SODIUM SCH MG: 25 TABLET ORAL at 05:50

## 2017-05-05 RX ADMIN — DOCUSATE SODIUM SCH MG: 100 CAPSULE, LIQUID FILLED ORAL at 08:50

## 2017-05-05 RX ADMIN — PANTOPRAZOLE SODIUM SCH MG: 40 TABLET, DELAYED RELEASE ORAL at 08:50

## 2017-05-05 RX ADMIN — LABETALOL HCL SCH MG: 200 TABLET, FILM COATED ORAL at 20:32

## 2017-05-05 NOTE — IPN
DATE:  05/05/2017

 

Cindy Mena continues to have improvement in small amounts.  She certainly

has shown improvement.  Her medication has been unchanged at 120 mg Cymbalta,

bupropion 75 mg, and methylphenidate 10 mg twice a day.  Her appetite continues

to be good.  She continues to do her word puzzles.  She claims she is able to

take care of her activities of daily living, but some nursing staff and even her

roommates say they have to encourage her.  We are having an administrative

meeting today as part of our initiation of long-term planning if that should be

necessary.  If discharge planning can get associated services or assisted living

that may be an alternative.  Her speech is normal.  Thought processes are

slightly slow.  No loose associations.  No abnormal or psychotic thoughts.

Judgment and insight are good.  Patient is fully oriented.  Recent and remote

memory intact.  Attention and concentration are good.  No disturbance of

language.  She has a full fund of knowledge.  Mood is improving.  Affect is

brighter.  Denying hallucinations, delusions, obsessions, compulsions, and

phobias.  The question continues of her safety at home.

 

DIAGNOSIS:

Major depression.

## 2017-05-05 NOTE — IPN
DATE:  05/05/2017

 

A transfer hearing was held with Cindy Rajesh.  A decision was made that she

could not be transferred to [facility].  Treatment will continue as planned.

Please refer to progress notes.

## 2017-05-06 VITALS — SYSTOLIC BLOOD PRESSURE: 127 MMHG | DIASTOLIC BLOOD PRESSURE: 61 MMHG

## 2017-05-06 VITALS — SYSTOLIC BLOOD PRESSURE: 139 MMHG | DIASTOLIC BLOOD PRESSURE: 69 MMHG

## 2017-05-06 RX ADMIN — DOCUSATE SODIUM SCH MG: 100 CAPSULE, LIQUID FILLED ORAL at 20:15

## 2017-05-06 RX ADMIN — METFORMIN HYDROCHLORIDE SCH MG: 500 TABLET, EXTENDED RELEASE ORAL at 17:03

## 2017-05-06 RX ADMIN — MONTELUKAST SODIUM SCH MG: 10 TABLET, FILM COATED ORAL at 20:14

## 2017-05-06 RX ADMIN — ROSUVASTATIN CALCIUM SCH MG: 10 TABLET, FILM COATED ORAL at 20:15

## 2017-05-06 RX ADMIN — ALLOPURINOL SCH MG: 300 TABLET ORAL at 08:41

## 2017-05-06 RX ADMIN — LISINOPRIL SCH MG: 5 TABLET ORAL at 08:40

## 2017-05-06 RX ADMIN — PANTOPRAZOLE SODIUM SCH MG: 40 TABLET, DELAYED RELEASE ORAL at 08:39

## 2017-05-06 RX ADMIN — OXYCODONE HYDROCHLORIDE AND ACETAMINOPHEN SCH MG: 500 TABLET ORAL at 20:14

## 2017-05-06 RX ADMIN — METFORMIN HYDROCHLORIDE SCH MG: 500 TABLET, EXTENDED RELEASE ORAL at 08:41

## 2017-05-06 RX ADMIN — METHYLPHENIDATE HYDROCHLORIDE SCH MG: 5 TABLET ORAL at 12:34

## 2017-05-06 RX ADMIN — LABETALOL HCL SCH MG: 200 TABLET, FILM COATED ORAL at 20:14

## 2017-05-06 RX ADMIN — OXYCODONE HYDROCHLORIDE AND ACETAMINOPHEN SCH MG: 500 TABLET ORAL at 08:39

## 2017-05-06 RX ADMIN — DULOXETINE HYDROCHLORIDE SCH MG: 30 CAPSULE, DELAYED RELEASE ORAL at 08:39

## 2017-05-06 RX ADMIN — FERROUS SULFATE TAB 325 MG (65 MG ELEMENTAL FE) SCH MG: 325 (65 FE) TAB at 08:39

## 2017-05-06 RX ADMIN — METHYLPHENIDATE HYDROCHLORIDE SCH MG: 5 TABLET ORAL at 08:41

## 2017-05-06 RX ADMIN — OXYCODONE HYDROCHLORIDE AND ACETAMINOPHEN SCH MG: 500 TABLET ORAL at 15:30

## 2017-05-06 RX ADMIN — LEVOTHYROXINE SODIUM SCH MG: 25 TABLET ORAL at 06:01

## 2017-05-06 RX ADMIN — DOCUSATE SODIUM SCH MG: 100 CAPSULE, LIQUID FILLED ORAL at 08:39

## 2017-05-06 RX ADMIN — LABETALOL HCL SCH MG: 200 TABLET, FILM COATED ORAL at 08:40

## 2017-05-07 VITALS — DIASTOLIC BLOOD PRESSURE: 58 MMHG | SYSTOLIC BLOOD PRESSURE: 111 MMHG

## 2017-05-07 VITALS — SYSTOLIC BLOOD PRESSURE: 153 MMHG | DIASTOLIC BLOOD PRESSURE: 68 MMHG

## 2017-05-07 RX ADMIN — FERROUS SULFATE TAB 325 MG (65 MG ELEMENTAL FE) SCH MG: 325 (65 FE) TAB at 08:17

## 2017-05-07 RX ADMIN — MONTELUKAST SODIUM SCH MG: 10 TABLET, FILM COATED ORAL at 20:21

## 2017-05-07 RX ADMIN — ROSUVASTATIN CALCIUM SCH MG: 10 TABLET, FILM COATED ORAL at 20:22

## 2017-05-07 RX ADMIN — LABETALOL HCL SCH MG: 200 TABLET, FILM COATED ORAL at 08:18

## 2017-05-07 RX ADMIN — METHYLPHENIDATE HYDROCHLORIDE SCH MG: 5 TABLET ORAL at 13:16

## 2017-05-07 RX ADMIN — METFORMIN HYDROCHLORIDE SCH MG: 500 TABLET, EXTENDED RELEASE ORAL at 08:17

## 2017-05-07 RX ADMIN — OXYCODONE HYDROCHLORIDE AND ACETAMINOPHEN SCH MG: 500 TABLET ORAL at 20:21

## 2017-05-07 RX ADMIN — OXYCODONE HYDROCHLORIDE AND ACETAMINOPHEN SCH MG: 500 TABLET ORAL at 15:37

## 2017-05-07 RX ADMIN — OXYCODONE HYDROCHLORIDE AND ACETAMINOPHEN SCH MG: 500 TABLET ORAL at 08:16

## 2017-05-07 RX ADMIN — PANTOPRAZOLE SODIUM SCH MG: 40 TABLET, DELAYED RELEASE ORAL at 08:17

## 2017-05-07 RX ADMIN — DULOXETINE HYDROCHLORIDE SCH MG: 30 CAPSULE, DELAYED RELEASE ORAL at 08:17

## 2017-05-07 RX ADMIN — DOCUSATE SODIUM SCH MG: 100 CAPSULE, LIQUID FILLED ORAL at 20:21

## 2017-05-07 RX ADMIN — LABETALOL HCL SCH MG: 200 TABLET, FILM COATED ORAL at 20:24

## 2017-05-07 RX ADMIN — METHYLPHENIDATE HYDROCHLORIDE SCH MG: 5 TABLET ORAL at 08:17

## 2017-05-07 RX ADMIN — ALLOPURINOL SCH MG: 300 TABLET ORAL at 08:18

## 2017-05-07 RX ADMIN — LEVOTHYROXINE SODIUM SCH MG: 25 TABLET ORAL at 05:58

## 2017-05-07 RX ADMIN — DOCUSATE SODIUM SCH MG: 100 CAPSULE, LIQUID FILLED ORAL at 08:16

## 2017-05-07 RX ADMIN — METFORMIN HYDROCHLORIDE SCH MG: 500 TABLET, EXTENDED RELEASE ORAL at 17:01

## 2017-05-07 RX ADMIN — LISINOPRIL SCH MG: 5 TABLET ORAL at 08:17

## 2017-05-08 VITALS — DIASTOLIC BLOOD PRESSURE: 72 MMHG | SYSTOLIC BLOOD PRESSURE: 150 MMHG

## 2017-05-08 VITALS — DIASTOLIC BLOOD PRESSURE: 67 MMHG | SYSTOLIC BLOOD PRESSURE: 130 MMHG

## 2017-05-08 RX ADMIN — PANTOPRAZOLE SODIUM SCH MG: 40 TABLET, DELAYED RELEASE ORAL at 09:26

## 2017-05-08 RX ADMIN — DOCUSATE SODIUM SCH MG: 100 CAPSULE, LIQUID FILLED ORAL at 09:26

## 2017-05-08 RX ADMIN — METFORMIN HYDROCHLORIDE SCH MG: 500 TABLET, EXTENDED RELEASE ORAL at 16:46

## 2017-05-08 RX ADMIN — METFORMIN HYDROCHLORIDE SCH MG: 500 TABLET, EXTENDED RELEASE ORAL at 09:27

## 2017-05-08 RX ADMIN — METHYLPHENIDATE HYDROCHLORIDE SCH MG: 5 TABLET ORAL at 16:17

## 2017-05-08 RX ADMIN — DULOXETINE HYDROCHLORIDE SCH MG: 30 CAPSULE, DELAYED RELEASE ORAL at 09:27

## 2017-05-08 RX ADMIN — MONTELUKAST SODIUM SCH MG: 10 TABLET, FILM COATED ORAL at 20:14

## 2017-05-08 RX ADMIN — OXYCODONE HYDROCHLORIDE AND ACETAMINOPHEN SCH MG: 500 TABLET ORAL at 16:17

## 2017-05-08 RX ADMIN — METHYLPHENIDATE HYDROCHLORIDE SCH MG: 5 TABLET ORAL at 09:26

## 2017-05-08 RX ADMIN — ALLOPURINOL SCH MG: 300 TABLET ORAL at 09:27

## 2017-05-08 RX ADMIN — DOCUSATE SODIUM SCH MG: 100 CAPSULE, LIQUID FILLED ORAL at 20:14

## 2017-05-08 RX ADMIN — LABETALOL HCL SCH MG: 200 TABLET, FILM COATED ORAL at 20:15

## 2017-05-08 RX ADMIN — OXYCODONE HYDROCHLORIDE AND ACETAMINOPHEN SCH MG: 500 TABLET ORAL at 20:14

## 2017-05-08 RX ADMIN — OXYCODONE HYDROCHLORIDE AND ACETAMINOPHEN SCH MG: 500 TABLET ORAL at 09:27

## 2017-05-08 RX ADMIN — LEVOTHYROXINE SODIUM SCH MG: 25 TABLET ORAL at 05:51

## 2017-05-08 RX ADMIN — FERROUS SULFATE TAB 325 MG (65 MG ELEMENTAL FE) SCH MG: 325 (65 FE) TAB at 09:26

## 2017-05-08 RX ADMIN — ROSUVASTATIN CALCIUM SCH MG: 10 TABLET, FILM COATED ORAL at 20:14

## 2017-05-08 RX ADMIN — LISINOPRIL SCH MG: 5 TABLET ORAL at 09:27

## 2017-05-08 RX ADMIN — LABETALOL HCL SCH MG: 200 TABLET, FILM COATED ORAL at 09:27

## 2017-05-08 NOTE — IPN
DATE:   05/08/2017

 

81-year-old female with a history of depression, severe symptoms of anxiety and

depression, making statements such as "I want to die."  The patient was also

showing symptoms of catatonia and she stopped eating and drinking and also taking

her medications.

 

SUBJECTIVE:  "I am feeling a little better."

 

OBJECTIVE:  The patient is improving slowly.  She continues depressed with poor

eye contact, psychomotor retardation.  She is able to contract for safety.  There

is no evidence of psychotic symptoms.  The patient is denying side effects from

the medications.

 

MENTAL STATUS EXAMINATION:

The patient is dressed in [facility].  The patient is cooperative, has fair

eye contact.  Speech is slow and monotone. Mood is depressed but improving.

Affect is restricted.  No evidence of delusions or hallucinations.  Memory is

fair.  The patient is fully oriented.  Associations are intact.  Thinking is

logical.  Thought content is appropriate.  The patient is able to contract for

safety and denies suicidal or homicidal ideation during the interview.  Insight

and judgment are limited.

 

ASSESSMENT:

1.  Major depressive disorder.

 

PLAN:

1.  Continue with Cymbalta 120 mg daily.

2.  Continue Ritalin 10 mg by mouth twice a day.

3.  Continue bupropion 75 mg by mouth in the morning.

4.  Continue trazodone 75 mg by mouth at night as needed for insomnia.

5.  Continue individual and group therapy as tolerated.

## 2017-05-09 VITALS — SYSTOLIC BLOOD PRESSURE: 119 MMHG | DIASTOLIC BLOOD PRESSURE: 66 MMHG

## 2017-05-09 VITALS — DIASTOLIC BLOOD PRESSURE: 69 MMHG | SYSTOLIC BLOOD PRESSURE: 147 MMHG

## 2017-05-09 RX ADMIN — DOCUSATE SODIUM SCH MG: 100 CAPSULE, LIQUID FILLED ORAL at 20:17

## 2017-05-09 RX ADMIN — METHYLPHENIDATE HYDROCHLORIDE SCH MG: 5 TABLET ORAL at 09:39

## 2017-05-09 RX ADMIN — OXYCODONE HYDROCHLORIDE AND ACETAMINOPHEN SCH MG: 500 TABLET ORAL at 20:17

## 2017-05-09 RX ADMIN — FERROUS SULFATE TAB 325 MG (65 MG ELEMENTAL FE) SCH MG: 325 (65 FE) TAB at 09:39

## 2017-05-09 RX ADMIN — METFORMIN HYDROCHLORIDE SCH MG: 500 TABLET, EXTENDED RELEASE ORAL at 17:32

## 2017-05-09 RX ADMIN — ROSUVASTATIN CALCIUM SCH MG: 10 TABLET, FILM COATED ORAL at 20:18

## 2017-05-09 RX ADMIN — PANTOPRAZOLE SODIUM SCH MG: 40 TABLET, DELAYED RELEASE ORAL at 09:40

## 2017-05-09 RX ADMIN — OXYCODONE HYDROCHLORIDE AND ACETAMINOPHEN SCH MG: 500 TABLET ORAL at 15:57

## 2017-05-09 RX ADMIN — LEVOTHYROXINE SODIUM SCH MG: 25 TABLET ORAL at 05:38

## 2017-05-09 RX ADMIN — LABETALOL HCL SCH MG: 200 TABLET, FILM COATED ORAL at 20:18

## 2017-05-09 RX ADMIN — DOCUSATE SODIUM SCH MG: 100 CAPSULE, LIQUID FILLED ORAL at 09:40

## 2017-05-09 RX ADMIN — MONTELUKAST SODIUM SCH MG: 10 TABLET, FILM COATED ORAL at 20:18

## 2017-05-09 RX ADMIN — LABETALOL HCL SCH MG: 200 TABLET, FILM COATED ORAL at 09:40

## 2017-05-09 RX ADMIN — OXYCODONE HYDROCHLORIDE AND ACETAMINOPHEN SCH MG: 500 TABLET ORAL at 09:39

## 2017-05-09 RX ADMIN — LISINOPRIL SCH MG: 5 TABLET ORAL at 09:40

## 2017-05-09 RX ADMIN — METFORMIN HYDROCHLORIDE SCH MG: 500 TABLET, EXTENDED RELEASE ORAL at 09:39

## 2017-05-09 RX ADMIN — DULOXETINE HYDROCHLORIDE SCH MG: 30 CAPSULE, DELAYED RELEASE ORAL at 09:39

## 2017-05-09 RX ADMIN — METHYLPHENIDATE HYDROCHLORIDE SCH MG: 5 TABLET ORAL at 12:42

## 2017-05-09 RX ADMIN — ALLOPURINOL SCH MG: 300 TABLET ORAL at 09:40

## 2017-05-09 NOTE — IPN
DATE:  05/09/2017

 

An 81-year-old female with a history of depression and severe symptoms of

anxiety, admitted for suicidal ideation and making statements such as "I want to

die." The patient was also showing signs and symptoms of catatonia. She stopped

eating and drinking and also taking her medications.

 

SUBJECTIVE: "I'm feeling a little better.

 

OBJECTIVE: The patient continues improving. The patient reports poor sleep, but

she does not want to take more medication because "it's enough with the

medication I'm taking now." I discussed this with the patient.

 

The patient is improving. The patient is less depressed, although she stays in

bed most of the time. Has little interaction with other patients

and staff; however, she can contract for safety and denies suicidal ideation

during the interview. No evidence of psychotic symptoms.

 

MENTAL STATUS EXAMINATION: Patient dressed in [facility]. The patient is

cooperative during the exam, has fair eye contact. Rate, volume are improving. 
Affect is congruent with mood. No evidence of

delusions or hallucinations. Memory, attention and concentration are fair. The

patient is denying suicidal or homicidal ideation during the interview. Insight

and judgment is fair.

 

ASSESSMENT:

1. Major depressive disorder.

 

PLAN:

1. Continue Cymbalta 120 mg by mouth daily.

2. Continue with bupropion 75 mg by mouth every morning.

3. Start trazodone 25 mg by mouth every morning.

4. Continue Ritalin 10 mg by mouth twice a day.

5. Continue medication management, individual and group therapy.

6. Discharge process has been started.

[facility]

## 2017-05-10 VITALS — SYSTOLIC BLOOD PRESSURE: 104 MMHG | DIASTOLIC BLOOD PRESSURE: 59 MMHG

## 2017-05-10 VITALS — SYSTOLIC BLOOD PRESSURE: 127 MMHG | DIASTOLIC BLOOD PRESSURE: 63 MMHG

## 2017-05-10 RX ADMIN — OXYCODONE HYDROCHLORIDE AND ACETAMINOPHEN SCH MG: 500 TABLET ORAL at 09:22

## 2017-05-10 RX ADMIN — FERROUS SULFATE TAB 325 MG (65 MG ELEMENTAL FE) SCH MG: 325 (65 FE) TAB at 09:22

## 2017-05-10 RX ADMIN — METFORMIN HYDROCHLORIDE SCH MG: 500 TABLET, EXTENDED RELEASE ORAL at 09:23

## 2017-05-10 RX ADMIN — LISINOPRIL SCH MG: 5 TABLET ORAL at 09:23

## 2017-05-10 RX ADMIN — METHYLPHENIDATE HYDROCHLORIDE SCH MG: 5 TABLET ORAL at 12:27

## 2017-05-10 RX ADMIN — ALLOPURINOL SCH MG: 300 TABLET ORAL at 09:22

## 2017-05-10 RX ADMIN — DULOXETINE HYDROCHLORIDE SCH MG: 30 CAPSULE, DELAYED RELEASE ORAL at 09:23

## 2017-05-10 RX ADMIN — LABETALOL HCL SCH MG: 200 TABLET, FILM COATED ORAL at 09:24

## 2017-05-10 RX ADMIN — METHYLPHENIDATE HYDROCHLORIDE SCH MG: 5 TABLET ORAL at 09:23

## 2017-05-10 RX ADMIN — MONTELUKAST SODIUM SCH MG: 10 TABLET, FILM COATED ORAL at 20:16

## 2017-05-10 RX ADMIN — DOCUSATE SODIUM SCH MG: 100 CAPSULE, LIQUID FILLED ORAL at 20:16

## 2017-05-10 RX ADMIN — DOCUSATE SODIUM SCH MG: 100 CAPSULE, LIQUID FILLED ORAL at 09:22

## 2017-05-10 RX ADMIN — LEVOTHYROXINE SODIUM SCH MG: 25 TABLET ORAL at 06:11

## 2017-05-10 RX ADMIN — PANTOPRAZOLE SODIUM SCH MG: 40 TABLET, DELAYED RELEASE ORAL at 09:22

## 2017-05-10 RX ADMIN — OXYCODONE HYDROCHLORIDE AND ACETAMINOPHEN SCH MG: 500 TABLET ORAL at 16:30

## 2017-05-10 RX ADMIN — LABETALOL HCL SCH MG: 200 TABLET, FILM COATED ORAL at 20:16

## 2017-05-10 RX ADMIN — ROSUVASTATIN CALCIUM SCH MG: 10 TABLET, FILM COATED ORAL at 20:15

## 2017-05-10 RX ADMIN — OXYCODONE HYDROCHLORIDE AND ACETAMINOPHEN SCH MG: 500 TABLET ORAL at 20:16

## 2017-05-10 RX ADMIN — METFORMIN HYDROCHLORIDE SCH MG: 500 TABLET, EXTENDED RELEASE ORAL at 17:18

## 2017-05-11 VITALS — SYSTOLIC BLOOD PRESSURE: 105 MMHG | DIASTOLIC BLOOD PRESSURE: 56 MMHG

## 2017-05-11 VITALS — SYSTOLIC BLOOD PRESSURE: 140 MMHG | DIASTOLIC BLOOD PRESSURE: 76 MMHG

## 2017-05-11 RX ADMIN — LISINOPRIL SCH MG: 5 TABLET ORAL at 08:45

## 2017-05-11 RX ADMIN — ROSUVASTATIN CALCIUM SCH MG: 10 TABLET, FILM COATED ORAL at 20:19

## 2017-05-11 RX ADMIN — LEVOTHYROXINE SODIUM SCH MG: 25 TABLET ORAL at 05:57

## 2017-05-11 RX ADMIN — DOCUSATE SODIUM SCH MG: 100 CAPSULE, LIQUID FILLED ORAL at 20:19

## 2017-05-11 RX ADMIN — OXYCODONE HYDROCHLORIDE AND ACETAMINOPHEN SCH MG: 500 TABLET ORAL at 15:31

## 2017-05-11 RX ADMIN — DULOXETINE HYDROCHLORIDE SCH MG: 30 CAPSULE, DELAYED RELEASE ORAL at 08:48

## 2017-05-11 RX ADMIN — FERROUS SULFATE TAB 325 MG (65 MG ELEMENTAL FE) SCH MG: 325 (65 FE) TAB at 08:45

## 2017-05-11 RX ADMIN — MONTELUKAST SODIUM SCH MG: 10 TABLET, FILM COATED ORAL at 20:19

## 2017-05-11 RX ADMIN — OXYCODONE HYDROCHLORIDE AND ACETAMINOPHEN SCH MG: 500 TABLET ORAL at 08:49

## 2017-05-11 RX ADMIN — METHYLPHENIDATE HYDROCHLORIDE SCH MG: 5 TABLET ORAL at 12:30

## 2017-05-11 RX ADMIN — PANTOPRAZOLE SODIUM SCH MG: 40 TABLET, DELAYED RELEASE ORAL at 08:52

## 2017-05-11 RX ADMIN — DOCUSATE SODIUM SCH MG: 100 CAPSULE, LIQUID FILLED ORAL at 08:43

## 2017-05-11 RX ADMIN — METFORMIN HYDROCHLORIDE SCH MG: 500 TABLET, EXTENDED RELEASE ORAL at 08:44

## 2017-05-11 RX ADMIN — LABETALOL HCL SCH MG: 200 TABLET, FILM COATED ORAL at 08:44

## 2017-05-11 RX ADMIN — METFORMIN HYDROCHLORIDE SCH MG: 500 TABLET, EXTENDED RELEASE ORAL at 17:47

## 2017-05-11 RX ADMIN — OXYCODONE HYDROCHLORIDE AND ACETAMINOPHEN SCH MG: 500 TABLET ORAL at 20:19

## 2017-05-11 RX ADMIN — LABETALOL HCL SCH MG: 200 TABLET, FILM COATED ORAL at 20:19

## 2017-05-11 RX ADMIN — ALLOPURINOL SCH MG: 300 TABLET ORAL at 08:49

## 2017-05-11 RX ADMIN — METHYLPHENIDATE HYDROCHLORIDE SCH MG: 5 TABLET ORAL at 08:45

## 2017-05-11 NOTE — IPN
DATE:  05/11/2017

 

81-year-old female with history of depression, catatonia, anxiety, and suicidal

ideation.  Patient was making statements such as "I want to die."  She stopped

eating, drinking, and taking her medication.

 

SUBJECTIVE:  "I feel improvement."

 

OBJECTIVE:  Patient continues improving.  She was able to smile this morning.

She is sleeping better with the help of trazodone.  She is denying side effects

from the medication.  Patient stays mostly in her room but says that that is the

way she feels and does not want to go to the lounge and interact with other

patients.  The psychomotor retardation has significantly improved.  Patient is

denying suicidal ideation during the interview.  There is no evidence of

psychotic symptoms.

 

MENTAL STATUS EXAMINATION:  Patient is dressed in [facility].  Patient is

cooperative, able to smile today, has good eye contact.  Speech is normal in

rate, volume, and articulation.  Affect is congruent with mood.  No evidence of

delusions or hallucinations.  Memory, attention, and concentration are fair for

her age.  Patient denies suicidal or homicidal ideation during the interview.

Insight and judgment is fair.

 

DIAGNOSIS:  Major depressive disorder.

 

PLAN:

1.  Cymbalta 120 mg by mouth daily.

2.  Wellbutrin 75 mg by mouth every morning.

3.  Trazodone 50 mg by mouth nightly.

4.  Ritalin 10 mg by mouth twice a day.

5.  Continue medication management, individual and group therapy.

## 2017-05-11 NOTE — IPN
DATE:   05/10/2017

 

This is an 81-year-old female with history of depression catatonia, anxiety and

suicidal ideation. The patient was making statements such as "I want to die".

She stopped eating and drinking and also taking her medication.

 

SUBJECTIVE:  "I slept a little better".

 

OBJECTIVE: The patient continues improving. The patient reported better sleep

with 25 mg of trazodone. The patient is still depressed with psychomotor

retardation and restricted facial expression but is improving.  The patient has

very low interaction with other patients.  The patient is able to contract for

safety.  No evidence of psychotic symptoms.

 

MENTAL STATUS EXAMINATION: The patient is dressed in [facility]. The

patient is cooperative. Had fair eye contact.  Speech is normal in rate, volume

and articular.  Affect is congruent with mood.  No evidence of delusional or

hallucinations. Memory, attention and concentration are fair. The patient is

denying suicidal or homicidal ideation during the interview. Insight and judgment

is fair.

 

DIAGNOSIS:

Major depressive disorder.

 

PLAN:

1. Continue Cymbalta 120 mg by mouth daily.

2. Continue with Wellbutrin 75 mg by mouth every morning.

3. Trazodone 50 mg by mouth every morning.

4. Ritalin 10 mg by mouth twice a day.

5. Continue medication management, individual and group therapy.

## 2017-05-12 VITALS — SYSTOLIC BLOOD PRESSURE: 108 MMHG | DIASTOLIC BLOOD PRESSURE: 55 MMHG

## 2017-05-12 RX ADMIN — LEVOTHYROXINE SODIUM SCH MG: 25 TABLET ORAL at 05:58

## 2017-05-12 RX ADMIN — LABETALOL HCL SCH MG: 200 TABLET, FILM COATED ORAL at 08:37

## 2017-05-12 RX ADMIN — OXYCODONE HYDROCHLORIDE AND ACETAMINOPHEN SCH MG: 500 TABLET ORAL at 08:37

## 2017-05-12 RX ADMIN — METHYLPHENIDATE HYDROCHLORIDE SCH MG: 5 TABLET ORAL at 08:37

## 2017-05-12 RX ADMIN — FERROUS SULFATE TAB 325 MG (65 MG ELEMENTAL FE) SCH MG: 325 (65 FE) TAB at 08:36

## 2017-05-12 RX ADMIN — PANTOPRAZOLE SODIUM SCH MG: 40 TABLET, DELAYED RELEASE ORAL at 08:36

## 2017-05-12 RX ADMIN — ALLOPURINOL SCH MG: 300 TABLET ORAL at 08:37

## 2017-05-12 RX ADMIN — DULOXETINE HYDROCHLORIDE SCH MG: 30 CAPSULE, DELAYED RELEASE ORAL at 08:36

## 2017-05-12 RX ADMIN — LISINOPRIL SCH MG: 5 TABLET ORAL at 08:37

## 2017-05-12 RX ADMIN — METFORMIN HYDROCHLORIDE SCH MG: 500 TABLET, EXTENDED RELEASE ORAL at 08:37

## 2017-05-12 RX ADMIN — DOCUSATE SODIUM SCH MG: 100 CAPSULE, LIQUID FILLED ORAL at 08:36

## 2017-05-13 NOTE — MHDS
DATE OF ADMISSION:  03/22/2017

DATE OF DISCHARGE: 05/12/2017

 

HISTORY OF PRESENT ILLNESS:

The following information is according to the initial evaluation of Dr. Hughes,

his progress notes, and my own progress notes. On 03/22, Dr. Hughes stated that

this 81-year-old female was admitted for increased anxiety and depression and

stating to her family that she was "wanting to die." This patient apparently for

an unknown amount of time, has stopped taking her medications at home. She has

had poor sleep, has had a history of depression an catatonia, and was treated

last April. She has stopped eating and drinking and taking her medications. She

had a fall and broke her hip and was recently discharged from rehabilitation.

About three days ago it was noticed that she had stopped sleeping, that she would

not leave her house, she would not use her dentures. She stopped using lipstick,

she stopped smiling and laughing. The patient had been on Effexor. She was not

sure how long she has been on it, 150 mg.

 

Medical history has been positive for back surgery, hand surgery, thyroid

abnormalities, high blood pressure, and three coronary artery grafts. She does

not remember her psychiatric history. She has had in the past electroshock

therapy for her depression. She saw a psychiatrist in [place] but does not

remember when.

 

Alcohol history is negative. Drug history is negative. She has been here from

[place] since June. Her daughter, Dina, visits her on-and-off. Her appetite

is down. Her sleep is poor. She has had initial insomnia. She denying suicidal

ideation, has not had psychiatric followup for financial reasons, and gets her

medicine from her primary care physician. She reported some chest heaviness on

admission and was seen by Sylvia De Santiago. She complains of some gas.

 

LABORATORIES ON ADMISSION:

CBC was unremarkable. CMP showed a sodium of ____________________ , her total

protein was 6.3.

 

HOSPITAL COURSE:

The patient was initially evaluated by Dr. Hughes, and was started on Cymbalta

increasing dosage up to 120 mg by mouth daily. Was also started on bupropion up

to 75 mg by mouth daily. Dr. Hughes started Ritalin up to 10 mg by mouth twice a

day. Her recovery has been very slow. I started seeing the patient on 05/08/2017.

At that point, the patient was significantly better from admission. She has

continued to improve during the last few days. By the end of the hospitalization,

the patient denies feelings of depression. She is able to smile. Her facial

expression has normalized. She no longer has psychomotor retardation. She prefers

to stay in her room because she does not want to go to the lounge and talk to

other patients; however, she is denying any suicidal or homicidal ideations.

There is no evidence of psychotic symptoms. Social work has helped her to get

back on her medications and also she is going to go to [facility] for

the day treatment program on Monday, Wednesday, and Friday at discharge. Before

discharge, a meeting was held with the patient, her daughter, and the discharge

planner, to talk about her outpatient care. She has set appointments for

psychiatric medication management, individual psychotherapy and primary care

physician. She also will have home healthcare come to her house for monitoring,

so she is discharged on 05/12 in stable condition.

 

MENTAL STATUS EXAMINATION AT DISCHARGE:

The patient is dressed in \[facility]\"". The patient is calm and cooperative.

Speech is clear, coherent, with normal rate and is spontaneous. The patient has

good eye contact. Mood is slightly depressed but significantly improved from

admission. Affect is appropriate and congruent with mood. The patient is oriented

to time, place, person and situation. Maintains attention and concentration

correctly. Instant recall, recent and remote memory are intact. Thought processes

are coherent, logical and goal directed. The patient does not have auditory or

visual hallucinations. The patient does not have paranoid, persecutory, somatic,

grandiose or Mandaen delusions. The patient is denying suicidal or homicidal

ideations. Judgment and insight are fair.

 

DISCHARGE MEDICATIONS:

- trazodone 50 mg by mouth at bedtime

- Cymbalta 120 mg by mouth daily

- bupropion 75 mg by mouth every morning

- Ritalin 10 mg by mouth at 8 a.m. and 1300 p.m.

 

DISCHARGE DIAGNOSES:

Axis I:  Major depressive disorder.

Axis II: Deferred.

Axis III: Hypertension, gout, hyperlipidemia, hypothyroidism,

non-insulin-dependent diabetes mellitus, coronary artery disease, iron

deficiency, history of left hip fracture.

 

INSTRUCTIONS TO THE PATIENT:

The patient is to continue taking the medication as prescribed. Followup

appointment. She is advised to maintain absolute sobriety from drugs and alcohol.

She has a scheduled appointment for psychotropic medication management,

individual psychotherapy, primary care physician, and home healthcare.

## 2018-03-03 ENCOUNTER — HOSPITAL ENCOUNTER (OUTPATIENT)
Dept: HOSPITAL 53 - M ED | Age: 83
Setting detail: OBSERVATION
LOS: 1 days | Discharge: HOME | End: 2018-03-04
Attending: INTERNAL MEDICINE | Admitting: HOSPITALIST
Payer: MEDICARE

## 2018-03-03 DIAGNOSIS — Z79.899: ICD-10-CM

## 2018-03-03 DIAGNOSIS — I25.10: ICD-10-CM

## 2018-03-03 DIAGNOSIS — D50.9: ICD-10-CM

## 2018-03-03 DIAGNOSIS — I10: ICD-10-CM

## 2018-03-03 DIAGNOSIS — E11.9: ICD-10-CM

## 2018-03-03 DIAGNOSIS — M10.9: ICD-10-CM

## 2018-03-03 DIAGNOSIS — E78.5: ICD-10-CM

## 2018-03-03 DIAGNOSIS — K52.9: Primary | ICD-10-CM

## 2018-03-03 DIAGNOSIS — E03.9: ICD-10-CM

## 2018-03-03 LAB
ALBUMIN/GLOBULIN RATIO: 1.38 (ref 1–1.93)
ALBUMIN: 3.6 GM/DL (ref 3.2–5.2)
ALKALINE PHOSPHATASE: 48 U/L (ref 45–117)
ALT SERPL W P-5'-P-CCNC: 18 U/L (ref 12–78)
ANION GAP: 9 MEQ/L (ref 8–16)
AST SERPL-CCNC: 15 U/L (ref 7–37)
BASO #: 0 10^3/UL (ref 0–0.2)
BASO %: 0.7 % (ref 0–1)
BILIRUB CONJ SERPL-MCNC: 0.2 MG/DL (ref 0–0.2)
BILIRUBIN,TOTAL: 0.7 MG/DL (ref 0.2–1)
BLOOD UREA NITROGEN: 7 MG/DL (ref 7–18)
CALCIUM LEVEL: 8.8 MG/DL (ref 8.8–10.2)
CARBON DIOXIDE LEVEL: 26 MEQ/L (ref 21–32)
CHLORIDE LEVEL: 101 MEQ/L (ref 98–107)
CK MB CFR.DF SERPL CALC: 3.55
CK SERPL-CCNC: 59 U/L (ref 26–192)
CK-MB VALUE MASS: 2.1 NG/ML (ref 0–3.6)
CREATININE FOR GFR: 0.55 MG/DL (ref 0.55–1.3)
EOS #: 0 10^3/UL (ref 0–0.5)
EOSINOPHIL NFR BLD AUTO: 0 % (ref 0–3)
FLUAV RNA UPPER RESP QL NAA+PROBE: NEGATIVE
GFR SERPL CREATININE-BSD FRML MDRD: > 60 ML/MIN/{1.73_M2} (ref 32–?)
GLUCOSE BLDC GLUCOMTR-MCNC: 112 MG/DL (ref 83–110)
GLUCOSE BLDC GLUCOMTR-MCNC: 122 MG/DL (ref 83–110)
GLUCOSE, FASTING: 133 MG/DL (ref 70–100)
HEMATOCRIT: 36.4 % (ref 36–47)
HEMOGLOBIN: 12.9 G/DL (ref 12–16)
IMMATURE GRANULOCYTE %: 0.4 % (ref 0–3)
INFLUENZA B AMPLIFICATION: NEGATIVE
INR: 1.12
LACTIC ACID SEPSIS PROTOCOL: 1.1 MMOL/L (ref 0.4–2)
LEUKOCYTE ESTERASE UR AUTO RFX: NEGATIVE
LIPASE: 156 U/L (ref 73–393)
LYMPH #: 0.9 10^3/UL (ref 1.5–4.5)
LYMPH %: 17.1 % (ref 24–44)
MEAN CORPUSCULAR HEMOGLOBIN: 31.9 PG (ref 27–33)
MEAN CORPUSCULAR HGB CONC: 35.4 G/DL (ref 32–36.5)
MEAN CORPUSCULAR VOLUME: 89.9 FL (ref 80–96)
MONO #: 0.4 10^3/UL (ref 0–0.8)
MONO %: 6.6 % (ref 0–5)
NEUTROPHILS #: 4.1 10^3/UL (ref 1.8–7.7)
NEUTROPHILS %: 75.2 % (ref 36–66)
NRBC BLD AUTO-RTO: 0 % (ref 0–0)
PLATELET COUNT, AUTOMATED: 258 10^3/UL (ref 150–450)
POTASSIUM SERUM: 3.6 MEQ/L (ref 3.5–5.1)
PROTHROMBIN TIME: 14.6 SECONDS (ref 12.4–14.5)
RED BLOOD COUNT: 4.05 10^6/UL (ref 4–5.4)
RED CELL DISTRIBUTION WIDTH: 12.5 % (ref 11.5–14.5)
RSV AMPLIFICATION: NEGATIVE
SODIUM LEVEL: 136 MEQ/L (ref 136–145)
SPECIFIC GRAVITY UR AUTO RFX: 1.02 (ref 1–1.03)
SQUAM EPITHELIAL CELL UR AURFX: 0 /HPF (ref 0–6)
THYROID STIMULATING HORMONE: 1.62 UIU/ML (ref 0.36–3.74)
TOTAL PROTEIN: 6.2 GM/DL (ref 6.4–8.2)
TROPONIN I: < 0.02 NG/ML (ref ?–0.1)
WHITE BLOOD COUNT: 5.4 10^3/UL (ref 4–10)

## 2018-03-03 RX ADMIN — SODIUM CHLORIDE 1 MLS/HR: 9 INJECTION, SOLUTION INTRAVENOUS at 16:55

## 2018-03-03 RX ADMIN — OXYCODONE HYDROCHLORIDE AND ACETAMINOPHEN 1 MG: 500 TABLET ORAL at 22:00

## 2018-03-03 RX ADMIN — OXYCODONE HYDROCHLORIDE AND ACETAMINOPHEN 1 MG: 500 TABLET ORAL at 18:28

## 2018-03-03 RX ADMIN — PANTOPRAZOLE SODIUM 1 MG: 40 TABLET, DELAYED RELEASE ORAL at 16:54

## 2018-03-03 RX ADMIN — LABETALOL HYDROCHLORIDE 1 MG: 100 TABLET, FILM COATED ORAL at 16:54

## 2018-03-03 RX ADMIN — INSULIN LISPRO 2 UNITS: 100 INJECTION, SOLUTION INTRAVENOUS; SUBCUTANEOUS at 18:29

## 2018-03-03 RX ADMIN — INSULIN LISPRO 1 UNITS: 100 INJECTION, SOLUTION INTRAVENOUS; SUBCUTANEOUS at 21:00

## 2018-03-03 RX ADMIN — DULOXETINE HYDROCHLORIDE 1 MG: 30 CAPSULE, DELAYED RELEASE ORAL at 16:54

## 2018-03-03 RX ADMIN — LEVOTHYROXINE SODIUM 1 MCG: 25 TABLET ORAL at 16:54

## 2018-03-03 RX ADMIN — MONTELUKAST SODIUM 1 MG: 10 TABLET, FILM COATED ORAL at 22:02

## 2018-03-03 RX ADMIN — LABETALOL HCL 1 MG: 200 TABLET, FILM COATED ORAL at 22:02

## 2018-03-03 RX ADMIN — ROSUVASTATIN CALCIUM 1 MG: 10 TABLET, FILM COATED ORAL at 22:00

## 2018-03-03 RX ADMIN — LISINOPRIL 1 MG: 5 TABLET ORAL at 16:55

## 2018-03-04 LAB
ANION GAP: 7 MEQ/L (ref 8–16)
BLOOD UREA NITROGEN: 7 MG/DL (ref 7–18)
CALCIUM LEVEL: 8.7 MG/DL (ref 8.8–10.2)
CARBON DIOXIDE LEVEL: 27 MEQ/L (ref 21–32)
CHLORIDE LEVEL: 103 MEQ/L (ref 98–107)
CREATININE FOR GFR: 0.65 MG/DL (ref 0.55–1.3)
GFR SERPL CREATININE-BSD FRML MDRD: > 60 ML/MIN/{1.73_M2} (ref 32–?)
GLUCOSE BLDC GLUCOMTR-MCNC: 104 MG/DL (ref 83–110)
GLUCOSE BLDC GLUCOMTR-MCNC: 121 MG/DL (ref 83–110)
GLUCOSE, FASTING: 117 MG/DL (ref 70–100)
HEMATOCRIT: 35.5 % (ref 36–47)
HEMOGLOBIN: 12.1 G/DL (ref 12–16)
MEAN CORPUSCULAR HEMOGLOBIN: 31.3 PG (ref 27–33)
MEAN CORPUSCULAR HGB CONC: 34.1 G/DL (ref 32–36.5)
MEAN CORPUSCULAR VOLUME: 91.7 FL (ref 80–96)
NRBC BLD AUTO-RTO: 0 % (ref 0–0)
PLATELET COUNT, AUTOMATED: 240 10^3/UL (ref 150–450)
POTASSIUM SERUM: 3.5 MEQ/L (ref 3.5–5.1)
RED BLOOD COUNT: 3.87 10^6/UL (ref 4–5.4)
RED CELL DISTRIBUTION WIDTH: 12.9 % (ref 11.5–14.5)
SODIUM LEVEL: 137 MEQ/L (ref 136–145)
WHITE BLOOD COUNT: 4.8 10^3/UL (ref 4–10)

## 2018-03-04 RX ADMIN — DULOXETINE HYDROCHLORIDE 1 MG: 30 CAPSULE, DELAYED RELEASE ORAL at 08:54

## 2018-03-04 RX ADMIN — LABETALOL HCL 1 MG: 200 TABLET, FILM COATED ORAL at 08:55

## 2018-03-04 RX ADMIN — LEVOTHYROXINE SODIUM 1 MCG: 25 TABLET ORAL at 05:40

## 2018-03-04 RX ADMIN — MULTIPLE VITAMINS W/ MINERALS TAB 1 TAB: TAB at 08:55

## 2018-03-04 RX ADMIN — INSULIN LISPRO 1 UNITS: 100 INJECTION, SOLUTION INTRAVENOUS; SUBCUTANEOUS at 11:46

## 2018-03-04 RX ADMIN — LISINOPRIL 1 MG: 5 TABLET ORAL at 08:54

## 2018-03-04 RX ADMIN — PANTOPRAZOLE SODIUM 1 MG: 40 TABLET, DELAYED RELEASE ORAL at 08:55

## 2018-03-04 RX ADMIN — INSULIN LISPRO 1 UNITS: 100 INJECTION, SOLUTION INTRAVENOUS; SUBCUTANEOUS at 07:30

## 2018-03-04 RX ADMIN — OXYCODONE HYDROCHLORIDE AND ACETAMINOPHEN 1 MG: 500 TABLET ORAL at 08:54

## 2018-03-04 RX ADMIN — ENOXAPARIN SODIUM 1 MG: 40 INJECTION SUBCUTANEOUS at 08:56

## 2018-03-04 RX ADMIN — METHYLPHENIDATE HYDROCHLORIDE 1 MG: 5 TABLET ORAL at 08:54

## 2018-03-04 RX ADMIN — ALLOPURINOL 1 MG: 300 TABLET ORAL at 08:55

## 2021-03-26 ENCOUNTER — HOSPITAL ENCOUNTER (INPATIENT)
Dept: HOSPITAL 53 - M ED | Age: 86
LOS: 7 days | Discharge: NURSING, REHAB OR LONG-TERM CARE | DRG: 522 | End: 2021-04-02
Attending: INTERNAL MEDICINE | Admitting: INTERNAL MEDICINE
Payer: MEDICARE

## 2021-03-26 VITALS — BODY MASS INDEX: 26.43 KG/M2 | HEIGHT: 61 IN | WEIGHT: 139.99 LBS
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
## 2021-03-26 DIAGNOSIS — D50.9: ICD-10-CM
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
## 2021-03-26 DIAGNOSIS — Z95.1: ICD-10-CM

## 2021-03-26 DIAGNOSIS — I25.10: ICD-10-CM

## 2021-03-26 DIAGNOSIS — R33.9: ICD-10-CM

## 2021-03-26 DIAGNOSIS — I10: ICD-10-CM

## 2021-03-26 DIAGNOSIS — Z20.828: ICD-10-CM

## 2021-03-26 DIAGNOSIS — Z79.84: ICD-10-CM
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
## 2021-03-26 DIAGNOSIS — E11.9: ICD-10-CM

## 2021-03-26 DIAGNOSIS — E03.9: ICD-10-CM

## 2021-03-26 DIAGNOSIS — F33.1: ICD-10-CM

## 2021-03-26 DIAGNOSIS — Z88.5: ICD-10-CM

## 2021-03-26 DIAGNOSIS — Z79.899: ICD-10-CM

## 2021-03-26 DIAGNOSIS — M10.9: ICD-10-CM

## 2021-03-26 DIAGNOSIS — E78.5: ICD-10-CM

## 2021-03-26 DIAGNOSIS — M81.0: ICD-10-CM

## 2021-03-26 DIAGNOSIS — S72.011A: Primary | ICD-10-CM

## 2021-03-26 DIAGNOSIS — Z90.49: ICD-10-CM

## 2021-03-26 LAB
BASOPHILS # BLD AUTO: 0.1 10^3/UL (ref 0–0.2)
BASOPHILS NFR BLD AUTO: 0.7 % (ref 0–1)
EOSINOPHIL # BLD AUTO: 0.1 10^3/UL (ref 0–0.5)
EOSINOPHIL NFR BLD AUTO: 0.6 % (ref 0–3)
FERRITIN SERPL-MCNC: 191 NG/ML (ref 8–252)
FOLATE SERPL-MCNC: 10 NG/ML (ref 5.4–?)
HCT VFR BLD AUTO: 32.6 % (ref 36–47)
HGB BLD-MCNC: 11 G/DL (ref 12–15.5)
IRON SATN MFR SERPL: 8.3 % (ref 13.2–45)
IRON SERPL-MCNC: 18 UG/DL (ref 50–170)
LYMPHOCYTES # BLD AUTO: 1 10^3/UL (ref 1.5–5)
LYMPHOCYTES NFR BLD AUTO: 12.1 % (ref 24–44)
MCH RBC QN AUTO: 31.9 PG (ref 27–33)
MCHC RBC AUTO-ENTMCNC: 33.7 G/DL (ref 32–36.5)
MCV RBC AUTO: 94.5 FL (ref 80–96)
MONOCYTES # BLD AUTO: 0.6 10^3/UL (ref 0–0.8)
MONOCYTES NFR BLD AUTO: 7.4 % (ref 2–8)
NEUTROPHILS # BLD AUTO: 6.4 10^3/UL (ref 1.5–8.5)
NEUTROPHILS NFR BLD AUTO: 78.6 % (ref 36–66)
PLATELET # BLD AUTO: 299 10^3/UL (ref 150–450)
RBC # BLD AUTO: 3.45 10^6/UL (ref 4–5.4)
TIBC SERPL-MCNC: 218 UG/DL (ref 250–450)
VIT B12 SERPL-MCNC: 674 PG/ML (ref 247–911)
WBC # BLD AUTO: 8.1 10^3/UL (ref 4–10)

## 2021-03-26 NOTE — REPVR
PROCEDURE INFORMATION: 

Exam: CT Abdomen And Pelvis With Contrast 

Exam date and time: 3/26/2021 7:50 PM 

Age: 85 years old 

Clinical indication: Injury or trauma; Fall; Blunt; Generalized; Additional 

info: Fall, unable to ambulate, R flank pain, R back pain 



TECHNIQUE: 

Imaging protocol: Computed tomography of the abdomen and pelvis with contrast. 

Radiation optimization: All CT scans at this facility use at least one of these 

dose optimization techniques: automated exposure control; mA and/or kV 

adjustment per patient size (includes targeted exams where dose is matched to 

clinical indication); or iterative reconstruction. 

Contrast material: ISOVUE 370; Contrast volume: 100 ml; Contrast route: 

INTRAVENOUS (IV);  



COMPARISON: 

1. CT ABD PELVIS WITH CONTRAST 3/3/2018 11:55 AM 

2. CR HIPS BILAT W-AP PELVIS 3/26/2021 5:15:25 PM 



FINDINGS: 

Lungs: The imaged portions of the lung bases are clear. The lungs were not 

fully imaged. 

Heart: No cardiomegaly or pericardial effusion is noted. There are coronary 

artery calcifications. 



Liver: Intact. There is an 8 mm cyst in the left hepatic lobe, which is 

unchanged compared to the prior CT abdomen and pelvis on 03/03/2018 and for 

which further imaging follow-up is not necessary. The contour of the liver is 

smooth. No hepatomegaly is noted. 

Gallbladder and bile ducts: There has been a cholecystectomy. There is no fluid 

collection in the gallbladder fossa. No dilation of the bile ducts is noted. No 

calcified stones are seen in the common bile duct. 

Pancreas: Normal. No dilation of the main pancreatic duct is noted. There is no 

inflammatory fat stranding around the pancreas to suggest acute pancreatitis. 

Spleen: Normal. No splenomegaly is noted. 

Adrenal glands: Normal. No adrenal mass is noted. 

Kidneys and ureters: The kidneys are intact. No renal lesion is identified. No 

stones are noted in the kidneys or ureters. There is no hydronephrosis or 

hydroureter. Incidental note is made of a mildly dilated right extrarenal 

pelvis. 

Stomach and bowel: There is thickening of the wall of the stomach, which may be 

secondary to its decompressed state versus gastritis. The small bowel is 

unremarkable. There is no evidence for a bowel obstruction, diverticulosis, 

diverticulitis, colitis, perforated viscus, pneumatosis intestinalis, 

intussusception, or volvulus. 

Appendix: The appendix is not identified and may have been removed. No dilated 

blind ending tubular structure, inflammatory fat stranding, or fluid is noted 

in the expected location of the appendix. 



Intraperitoneal space: No free air. No hemorrhage. 

Retroperitoneal space: No retroperitoneal hemorrhage. 

Vasculature: No abdominal aortic aneurysm. There are extensive atherosclerotic 

calcifications. No occlusion of the celiac artery, superior mesenteric artery, 

inferior mesenteric artery, renal arteries, or iliac arteries is noted. 

Lymph nodes: No enlarged lymph nodes. 

Urinary bladder: The urinary bladder is intact. No stones or masses are seen in 

the bladder. 

Reproductive: There has been a hysterectomy. The left ovary is unremarkable and 

contains a punctate calcification. The right ovary is not identified and may 

have been removed. 

Bones/joints: There is an acute, complete, impacted, displaced Garden type 4 

right subcapital femur fracture. There is an old healed fracture deformity 

involving the left intertrochanteric femur, which is fixated by a long 

intramedullary nail and interlocking femoral neck lag screw. There is extensive 

bony callus arising from the left lesser femoral trochanter that is similar 

compared to the prior CT abdomen and pelvis on 03/03/2018. There is an old 

healed fracture deformity of the left pubic bone that is similar compared to 

the prior CT abdomen and pelvis on 03/03/2018 and there are degenerative 

changes and chondrocalcinosis involving the pubic symphysis. There is mild 

osteoarthritis and chondrocalcinosis involving both hip joints. There is a 

defect in the right iliac bone secondary to a bone graft harvest site. 

Postoperative changes are noted from a posterior instrumented spinal fusion, 

solid bilateral posterolateral fusion, and right hemilaminectomy at the L4-L5 

levels, with bilateral vertical posterior rods and transpedicular screws. There 

is a chronic severe anterior wedge compression fracture of L1 and a chronic 

mild inferior endplate compression fracture of L2, which are stable compared to 

the prior CT abdomen and pelvis on 03/03/2018. The bones have a demineralized 

appearance. There are degenerative changes in the lumbar spine. There is a 

grade 1 anterolisthesis of L3 on L4 secondary to severe osteoarthritis of the 

L3-L4 facet joints. There are old healed fracture deformities of the right 

anterior 6th, 7th, 8th, and 9th ribs. The ribs were not fully imaged. 

Soft tissues: There are calcified granulomas in the bilateral gluteal 

subcutaneous tissues. There is a small intraosseous lipoma in the right 

external oblique muscle that is similar in appearance compared to the prior CT 

abdomen and pelvis on 03/03/2018. 



IMPRESSION: 

1. Acute, complete, impacted, displaced Garden type 4 right subcapital femur 

fracture. 

2. Intact abdominal and pelvic viscera. 

3. Thickening of the wall of the stomach, which may be secondary to its 

decompressed state versus gastritis. 



Electronically signed by: Jose Angel Lemus On 03/26/2021  21:03:38 PM

## 2021-03-26 NOTE — REP
INDICATION:

fall.



COMPARISON:

Comparison chest x-ray April 9, 2016..



TECHNIQUE:

Single AP chest radiograph.



FINDINGS:

Patient is status post median sternotomy and midthoracic vertebroplasty.  Heart is

mildly enlarged unchanged from the comparison study April 9, 2016.  The lungs are well

inflated and clear.  Pleural angles are sharp.  Pulmonary vasculature is not

increased.  No rib fracture or other skeletal fracture is appreciated.



IMPRESSION:

Mild cardiomegaly.  Prior sternotomy and thoracic vertebral plasty.  No acute

abnormality seen.





<Electronically signed by Edinson Morales > 03/26/21 [id number]

## 2021-03-26 NOTE — REP
INDICATION:

fall, unable to ambulate.



COMPARISON:

Comparison left knee radiographs are from May 18, 2016..



TECHNIQUE:

Two views of each knee are obtained as requested.



FINDINGS:

AP and lateral views of the left and right demonstrate no fracture or subluxation on

either side..  On the right there is chondrocalcinosis and diffuse osteoporosis.

Medial compartment osteoarthritic spurring is seen.  Vascular calcifications noted.



On the left there is an intramedullary jamila in the distal femur.  Chondrocalcinosis is

noted and medial and lateral compartment spurring is seen.  There is patellofemoral

spurring.  Multiple surgical clips are seen in the medial and posterior soft tissues

of the left leg.  .





IMPRESSION:

Osteoarthritis and diffuse osteoporosis.  No fracture or other acute abnormality..







<Electronically signed by Edinson Morales > 03/26/21 [id number]

## 2021-03-26 NOTE — HPEPDOC
[facility] Medical History & Physical


Date of Admission


Mar 26, 2021


Date of Service:  Mar 26, 2021


Primary Care Physician:  Jr Guillen Collins


Attending Physician:  LORNA LACY MD





History and Physical


TIME OF SERVICE: 1150pm


   


CHIEF COMPLAINT:  fall





HISTORY OF PRESENT ILLNESS: 


This 85 yr old F tripped and fell over her PJ pants while using her cane about 1

week ago. She has been having right leg pain but has been using her walker, 

unfortunately she also developed left leg pain therefore she decided to come to 

the hospital for evaluation. At its worst the pain is 8/10 in severity. She 

doesnt' want morphine because of a hx of adverse reactions to it. 





REVIEW OF SYSTEMS: 12-point review of systems negative except as listed in HPI





PAST MEDICAL/ SURGICAL HISTORY:


Hx of MDD was hospitalized in March 2017 / hx of electroshock therapy 


History of nonadherence to treatment recommendations/medical regimen


Hypertension


Gout


Dyslipidemia


Coronary artery disease


Iron deficiency anemia


Diabetes mellitus type 2


Hypothyroidism


Surgical History


Coronary artery bypass surgery in 2004


Hysterectomy


Tonsillectomy


Cholecystectomy


Appendectomy


Nailing of the left hip


Vertebroplasty





SOCIAL HISTORY:


Smoker:  Denies


Alcohol:  Denies


Drugs:  denies








FAMILY HISTORY: CVA, CAD


   


ALLERGIES: Please see below.





HOME MEDICATIONS: Please see below. 





PHYSICAL EXAMINATION:


Vital Signs








  Date Time  Temp Pulse Resp B/P (MAP) Pulse Ox O2 Delivery O2 Flow Rate FiO2


 


3/26/21 16:09 99.5 62 16 147/69 97 Room Air  





GENERAL APPEARANCE: well nourished and developed


HEENT: EOMI


CARDIOVASCULAR: RRR/NMRG


LUNGS: CTAB on RA


ABDOMEN: flat/ soft


MUSCULOSKELETAL: NCAT


INTEGUMENT: not flushed or pale


NEUROLOGICAL: CN 2-12 except for hearing grossly intact /speech not dysarthric


PSYCHIATRIC: A&O / able to understand and follow commands / flat affect





LABORATORY DATA:


3/26/21 18:42





3/26/21 18:41: 


POC Glucose (Misc Panel) 111H, POC Sodium (Misc Panel) 134L, POC Potassium (Misc

Panel) 4.2, POC Chloride (Misc Panel) 99, POC Total CO2 (Misc Panel) 24.0, POC 

Blood Urea Nitrogen (Misc Panel 21, POC Ionized Calcium (Misc Panel) 4.9, POC 

Creatinine (Misc Panel) 0.8, POC Hematocrit (Misc Panel) 33.0L


3/26/21 18:42: 


Immature Granulocyte % (Auto) 0.6, Neutrophils (%) (Auto) 78.6H, Lymphocytes (%)

(Auto) 12.1L, Monocytes (%) (Auto) 7.4, Eosinophils (%) (Auto) 0.6, Basophils 

(%) (Auto) 0.7, Neutrophils # (Auto) 6.4, Lymphocytes # (Auto) 1.0L, Monocytes #

(Auto) 0.6, Eosinophils # (Auto) 0.1, Basophils # (Auto) 0.1, Nucleated Red 

Blood Cells % (auto) 0.0





IMAGING: 


XRAY knee IMPRESSION: Osteoarthritis and diffuse osteoporosis.  No fracture or 

other acute abnormality.


XRAY hip IMPRESSION: Acute impacted cervical neck fracture right hip.  Old 

pending left hip.  No pelvic fracture seen.


CT abd/pelvis IMPRESSION:  1. Acute, complete, impacted, displaced Garden type 

4 right subcapital femur fracture. 2. Intact abdominal and pelvic viscera. 3. 

Thickening of the wall of the stomach, which may be secondary to its 

decompressed state versus gastritis. 


Chest xray IMPRESSION: Mild cardiomegaly.  Prior sternotomy and thoracic 

vertebral plasty.  No acute abnormality seen.





MICROBIOLOGY: COVID neg





ASSESSMENT: 


 is an 85 yr old w a hx of MDD, HTN, gout, DLP, CAD, DM2, hypo

thyroidism, and multiple surgeries who will be admitted for right femur fx.





PLAN:


1 Mechanical fall 2/2 debility resulting in right femur fx


Plan: admit to [facility] / NPO after midnight w IVF for surgery possibly tomorrow / 

Ortho consult / PT consult / pain control w tyelnol & Morphine PRN which I offer

ed her in case she changes her mind / based on revised cardiac risk index scor 

of 1 check BNP 





2 Osteoporosis 


Plan: f/u calcium with vitamin D / pt can f/u w her PCP for osteoporosis work up





3 Uncontrolled HTN


Plan: treat pain





4 Iron deficiency anemia


Plan: iron panel & type and screen





5 Hx of MDD 


Plan: Escitalopram





6  Hypertension


Plan: Labetalol / hold Lisinopril to avoid periop hypotension and resume on POD 

#2





7 Gout


Plan: Allopurinol





8 Dyslipidemia / Coronary artery disease /CABG


Plan: Labetalol Rosuvastatin





9 Diabetes mellitus type 2


Plan:  f/u accuchecks / hypoglycemia protocol / sliding scale insulin / hold 

oral anti-glycemic / f/u A1C 





10 Hypothyroidism


Plan: Levothyroxine





DVT px w SCDs





Dispo: home after at least 2 midnights stay





Home Medications


Scheduled


Allopurinol (Zyloprim) 300 Mg Tab, 300 MG PO DAILY


Ascorbic Acid (Vitamin C) 500 Mg Capsule, 500 MG PO DAILY


Calcium Carbonate/Vitamin D3 (Calcium 500-Vit D3 200 Caplet) 1 Tab Tab, 1 TAB PO

DAILY


   TAKES AT LUNCHTIME 


Docusate Sodium (Stool Softener) 100 Mg Capsule, 100 MG PO BID


Escitalopram Oxalate (Lexapro) 20 Mg Tablet, 20 MG PO QPM


Ferrous Sulfate (Ferrous Sulfate) 325 Mg Tab, 325 MG PO DAILY


Labetalol HCl (Labetalol HCl) 200 Mg Tab, 200 MG PO BID


Levothyroxine Sodium (Levothyroxine Sodium) 25 Mcg Tab, 25 MCG PO DAILY


Lisinopril (Lisinopril) 5 Mg Tab, 5 MG PO QPM


Magnesium Oxide (Magnesium Oxide) 400 Mg Tablet, 400 MG PO DAILY


   NOON 


Metformin HCl (Metformin ER Osmotic) 500 Mg Tab, 500 MG PO BID


Montelukast Sodium (Singulair) 10 Mg Tab, 10 MG PO QPM


Multivitamins (Thera M Plus Tablet) 1 Tab Tab, 1 TAB PO DAILY


   TAKES AT LUNCHTIME 


Pantoprazole Sodium (Pantoprazole Sodium) 40 Mg Tab, 40 MG PO DAILY


Rosuvastatin Calcium (Crestor) 5 Mg Tab, 5 MG PO QPM


Trazodone HCl (Trazodone HCl) 50 Mg Tab, 50 MG PO QPM





Allergies


Coded Allergies:  


     tramadol (Unverified  Allergy, Unknown, 3/26/21)





A-FIB/CHADSVASC


A-FIB History


Current/History of A-Fib/PAF?:  No


Current PO Anticoag Therapy:  No











LORNA LACY MD                Mar 26, 2021 21:52

## 2021-03-26 NOTE — REP
INDICATION:

fall, unable to ambulate.



COMPARISON:

Comparison study May 18, 2016..



TECHNIQUE:

AP view of the pelvis and AP and frogleg views of the left hip are obtained.  AP and

cross-table lateral views of the right hip are presented..



FINDINGS:

Patient is status post IM jamila and femoral neck pinning for old healed fracture of the

left proximal femur.  There is heterotopic bone formation adjacent the medial

trochanter.  The bony pelvic ring is intact.  No acute pelvic fracture is seen.  There

is osteoarthritis at the symphysis pubis and some sclerosis is seen in the SI joints.

Patient is status post L4-5 fusion.



There is an impacted cervical neck fracture of the right hip which appears acute.

Vascular calcification is noted.  No bony destructive lesion is appreciated.



IMPRESSION:

Acute impacted cervical neck fracture right hip.  Old pending left hip.  No pelvic

fracture seen.





<Electronically signed by Edinson Morales > 03/26/21 [id number]

## 2021-03-27 VITALS — DIASTOLIC BLOOD PRESSURE: 70 MMHG | SYSTOLIC BLOOD PRESSURE: 170 MMHG

## 2021-03-27 VITALS — DIASTOLIC BLOOD PRESSURE: 63 MMHG | SYSTOLIC BLOOD PRESSURE: 157 MMHG

## 2021-03-27 VITALS — SYSTOLIC BLOOD PRESSURE: 146 MMHG | DIASTOLIC BLOOD PRESSURE: 68 MMHG

## 2021-03-27 LAB
BUN SERPL-MCNC: 16 MG/DL (ref 7–18)
CALCIUM SERPL-MCNC: 8.8 MG/DL (ref 8.8–10.2)
CHLORIDE SERPL-SCNC: 102 MEQ/L (ref 98–107)
CO2 SERPL-SCNC: 27 MEQ/L (ref 21–32)
CREAT SERPL-MCNC: 0.62 MG/DL (ref 0.55–1.3)
EST. AVERAGE GLUCOSE BLD GHB EST-MCNC: 128 MG/DL (ref 60–110)
FERRITIN SERPL-MCNC: 205 NG/ML (ref 8–252)
GFR SERPL CREATININE-BSD FRML MDRD: > 60 ML/MIN/{1.73_M2} (ref 32–?)
GLUCOSE SERPL-MCNC: 104 MG/DL (ref 70–100)
HCT VFR BLD AUTO: 31.2 % (ref 36–47)
HGB BLD-MCNC: 10.7 G/DL (ref 12–15.5)
IRON SATN MFR SERPL: 15.5 % (ref 13.2–45)
IRON SERPL-MCNC: 33 UG/DL (ref 50–170)
MCH RBC QN AUTO: 32.3 PG (ref 27–33)
MCHC RBC AUTO-ENTMCNC: 34.3 G/DL (ref 32–36.5)
MCV RBC AUTO: 94.3 FL (ref 80–96)
PLATELET # BLD AUTO: 298 10^3/UL (ref 150–450)
POTASSIUM SERPL-SCNC: 3.8 MEQ/L (ref 3.5–5.1)
RBC # BLD AUTO: 3.31 10^6/UL (ref 4–5.4)
SODIUM SERPL-SCNC: 136 MEQ/L (ref 136–145)
TIBC SERPL-MCNC: 213 UG/DL (ref 250–450)
WBC # BLD AUTO: 6.2 10^3/UL (ref 4–10)

## 2021-03-27 RX ADMIN — MULTIPLE VITAMINS W/ MINERALS TAB SCH TAB: TAB at 12:00

## 2021-03-27 RX ADMIN — LABETALOL HCL SCH MG: 200 TABLET, FILM COATED ORAL at 20:07

## 2021-03-27 RX ADMIN — ESCITALOPRAM OXALATE SCH MG: 10 TABLET, FILM COATED ORAL at 17:29

## 2021-03-27 RX ADMIN — INSULIN LISPRO SCH UNITS: 100 INJECTION, SOLUTION INTRAVENOUS; SUBCUTANEOUS at 17:30

## 2021-03-27 RX ADMIN — ROSUVASTATIN CALCIUM SCH MG: 10 TABLET, FILM COATED ORAL at 01:21

## 2021-03-27 RX ADMIN — LABETALOL HCL SCH MG: 200 TABLET, FILM COATED ORAL at 09:22

## 2021-03-27 RX ADMIN — ESCITALOPRAM OXALATE SCH MG: 10 TABLET, FILM COATED ORAL at 01:22

## 2021-03-27 RX ADMIN — MAGNESIUM OXIDE SCH MG: 400 TABLET ORAL at 12:00

## 2021-03-27 RX ADMIN — INSULIN LISPRO SCH UNITS: 100 INJECTION, SOLUTION INTRAVENOUS; SUBCUTANEOUS at 00:00

## 2021-03-27 RX ADMIN — INSULIN LISPRO SCH UNITS: 100 INJECTION, SOLUTION INTRAVENOUS; SUBCUTANEOUS at 12:00

## 2021-03-27 RX ADMIN — Medication SCH MG: at 12:00

## 2021-03-27 RX ADMIN — MONTELUKAST SODIUM SCH MG: 10 TABLET, FILM COATED ORAL at 01:20

## 2021-03-27 RX ADMIN — DOCUSATE SODIUM SCH MG: 100 CAPSULE, LIQUID FILLED ORAL at 20:07

## 2021-03-27 RX ADMIN — ROSUVASTATIN CALCIUM SCH MG: 10 TABLET, FILM COATED ORAL at 17:29

## 2021-03-27 RX ADMIN — DOCUSATE SODIUM SCH MG: 100 CAPSULE, LIQUID FILLED ORAL at 09:22

## 2021-03-27 RX ADMIN — MONTELUKAST SODIUM SCH MG: 10 TABLET, FILM COATED ORAL at 17:29

## 2021-03-27 RX ADMIN — LABETALOL HCL SCH MG: 200 TABLET, FILM COATED ORAL at 01:26

## 2021-03-27 RX ADMIN — INSULIN LISPRO SCH UNITS: 100 INJECTION, SOLUTION INTRAVENOUS; SUBCUTANEOUS at 05:59

## 2021-03-27 RX ADMIN — DOCUSATE SODIUM SCH MG: 100 CAPSULE, LIQUID FILLED ORAL at 01:20

## 2021-03-27 NOTE — IPNPDOC
Subjective


Date Seen


The patient was seen on 3/27/21.





Subjective


Chief Complaint/HPI


Pain controlled mostly except when moves her right leg by mistake then has 

severe pain and muscle spasm.





Objective


Physical Examination


General Exam:  Positive: Alert, Cooperative, No Acute Distress


Eye Exam:  Positive: PERRLA, Conjunctiva & lids normal, EOMI; 


   Negative: Sclera icteric


ENT Exam:  Positive: Atraumatic, Mucous membr. moist/pink, Pharynx Normal


Neck Exam:  Positive: Supple; 


   Negative: JVD, thyromegaly


Chest Exam:  Positive: Clear to auscultation, Normal air movement


Heart Exam:  Positive: Rate Normal, Regular Rhythm, Normal S1, Normal S2; 


   Negative: Murmurs, Rubs


Abdomen Exam:  Positive: Normal bowel sounds, Soft; 


   Negative: Tenderness, Hepatospenomegaly


Extremity Exam:  Negative: Clubbing, Cyanosis, Edema





Assessment /Plan


Assessment


 is an 85 yr old with  a hx of MDD, HTN, gout, DLP, CAD/ CABG 2004, 

DM2, hypothyroidism, Hx of MDD was hospitalized in March 2017 / hx of electro

shock therapy, Iron deficiency anemia, left hip Fracture in 2016, Vertebroplasty

and multiple other surgeries had a mechanical fall about a week ago. On that day

it did not seem too bad and she was able to ambulate a bit but then it got worse

over the past few days and then she could no longer mobilize so called EMS and 

was brought to the ED. She was found to have right femur fx.





Mechanical fall resulting in right femur fx


planned for OR on 3/28/21 at 11:30 am. 


NPO midnight


pain control with ketorolac, morphine





Hypertension


continue labetelol and will give amlodipine. will hold lisinopril preop. 





Hx of MDD 


Escitalopram





Gout


Allopurinol





Dyslipidemia / Coronary artery disease /CABG


Labetalol, Rosuvastatin





Diabetes mellitus type 2


A1c 6.1


hypoglycemia protocol 


sliding scale insulin 





Hypothyroidism


Levothyroxine





Plan/VTE


VTE Prophylaxis Ordered?:  Yes





VS, I&O, 24H, Fishbonzulay


Vital Signs/I&O





Vital Signs








  Date Time  Temp Pulse Resp B/P (MAP) Pulse Ox O2 Delivery O2 Flow Rate FiO2


 


3/27/21 16:11 98.3 54 20 146/68 (94) 94 Room Air  











Laboratory Data


24H LABS


Laboratory Tests 2


3/26/21 18:25: Coronavirus (COVID-19)(PCR) NEGATIVE


3/26/21 18:41: 


POC Glucose (Misc Panel) 111H, POC Sodium (Misc Panel) 134L, POC Potassium (Misc

Panel) 4.2, POC Chloride (Misc Panel) 99, POC Total CO2 (Misc Panel) 24.0, POC 

Blood Urea Nitrogen (Misc Panel 21, POC Ionized Calcium (Misc Panel) 4.9, POC 

Creatinine (Misc Panel) 0.8, POC Hematocrit (Misc Panel) 33.0L


3/26/21 18:42: 


Immature Granulocyte % (Auto) 0.6, Neutrophils (%) (Auto) 78.6H, Lymphocytes (%)

(Auto) 12.1L, Monocytes (%) (Auto) 7.4, Eosinophils (%) (Auto) 0.6, Basophils 

(%) (Auto) 0.7, Neutrophils # (Auto) 6.4, Lymphocytes # (Auto) 1.0L, Monocytes #

(Auto) 0.6, Eosinophils # (Auto) 0.1, Basophils # (Auto) 0.1, Nucleated Red 

Blood Cells % (auto) 0.0, Iron Level 18L, Total Iron Binding Capacity 218L, 

Transferrin % Saturation 8.3L, Ferritin 191, NT-Pro-B-Type Natriuretic Peptide 

3385H, Vitamin B12 Level 674, Folate 10.0


3/26/21 21:48: 


Urine Color YELLOW, Urine Appearance CLEAR, Urine pH 7.0, Urine Specific Gravity

1.027, Urine Protein NEGATIVE, Urine Glucose (UA) NEGATIVE, Urine Ketones 

TRACEH, Urine Blood NEGATIVE, Urine Nitrite NEGATIVE, Urine Bilirubin NEGATIVE, 

Urine Urobilinogen 4.0H, Urine Leukocyte Esterase NEGATIVE, Urine WBC (Auto) 0, 

Urine RBC (Auto) 1, Urine Hyaline Casts (Auto) 0, Urine Bacteria (Auto) 

NEGATIVE, Urine Squamous Epithelial Cells 0, Urine Mucus (Auto) SMALL, Urine 

Sperm (Auto) 


3/27/21 01:33: Bedside Glucose (Misc Panel) 112H


3/27/21 05:58: Bedside Glucose (Misc Panel) 95


3/27/21 06:26: 


Nucleated Red Blood Cells % (auto) 0.0, Anion Gap 7L, Glomerular Filtration Rate

> 60.0, Estimated Mean Plasma Glucose 128H, Hemoglobin A1c 6.1, Calcium Level 

8.8, Iron Level 33L, Total Iron Binding Capacity 213L, Transferrin % Saturation 

15.5, Ferritin 205


3/27/21 12:45: Bedside Glucose (Misc Panel) 107


3/27/21 16:17: Bedside Glucose (Misc Panel) 90


CBC/BMP


Laboratory Tests


3/26/21 18:42








3/27/21 06:26

















LESLEY MANNING MD                   Mar 27, 2021 17:53

## 2021-03-27 NOTE — IPNPDOC
Text Note


Date of Service


The patient was seen on 3/27/21.





NOTE


Patient has h/o cad /CABG but no chronic chest pain or sob, Has h/o DM, BNP is 

elevated but no clinical features of CHF at present  or H/o CHF. Patient is at 

moderate cardiac risk for the proposed procedure. At present she is medically 

optimized to go ahead with orthopedic surgery.





VS,Fishbone, I+O


VS, Fishbone, I+O


Laboratory Tests


3/26/21 18:42








3/27/21 06:26











Vital Signs








  Date Time  Temp Pulse Resp B/P (MAP) Pulse Ox O2 Delivery O2 Flow Rate FiO2


 


3/27/21 09:22  62  173/112    


 


3/27/21 06:00   16  95 Room Air  


 


3/27/21 01:23 98.5       

















LESLEY MANNING MD                   Mar 27, 2021 09:30

## 2021-03-28 VITALS — SYSTOLIC BLOOD PRESSURE: 138 MMHG | DIASTOLIC BLOOD PRESSURE: 62 MMHG

## 2021-03-28 VITALS — DIASTOLIC BLOOD PRESSURE: 64 MMHG | SYSTOLIC BLOOD PRESSURE: 127 MMHG

## 2021-03-28 VITALS — DIASTOLIC BLOOD PRESSURE: 58 MMHG | SYSTOLIC BLOOD PRESSURE: 124 MMHG

## 2021-03-28 VITALS — SYSTOLIC BLOOD PRESSURE: 114 MMHG | DIASTOLIC BLOOD PRESSURE: 63 MMHG

## 2021-03-28 VITALS — DIASTOLIC BLOOD PRESSURE: 70 MMHG | SYSTOLIC BLOOD PRESSURE: 156 MMHG

## 2021-03-28 VITALS — DIASTOLIC BLOOD PRESSURE: 66 MMHG | SYSTOLIC BLOOD PRESSURE: 152 MMHG

## 2021-03-28 VITALS — SYSTOLIC BLOOD PRESSURE: 140 MMHG | DIASTOLIC BLOOD PRESSURE: 67 MMHG

## 2021-03-28 LAB
BASOPHILS # BLD AUTO: 0.1 10^3/UL (ref 0–0.2)
BASOPHILS NFR BLD AUTO: 1.3 % (ref 0–1)
BUN SERPL-MCNC: 18 MG/DL (ref 7–18)
CALCIUM SERPL-MCNC: 8.8 MG/DL (ref 8.8–10.2)
CHLORIDE SERPL-SCNC: 103 MEQ/L (ref 98–107)
CO2 SERPL-SCNC: 27 MEQ/L (ref 21–32)
CREAT SERPL-MCNC: 0.65 MG/DL (ref 0.55–1.3)
EOSINOPHIL # BLD AUTO: 0.2 10^3/UL (ref 0–0.5)
EOSINOPHIL NFR BLD AUTO: 2.8 % (ref 0–3)
GFR SERPL CREATININE-BSD FRML MDRD: > 60 ML/MIN/{1.73_M2} (ref 32–?)
GLUCOSE SERPL-MCNC: 114 MG/DL (ref 70–100)
HCT VFR BLD AUTO: 32.8 % (ref 36–47)
HGB BLD-MCNC: 10.9 G/DL (ref 12–15.5)
LYMPHOCYTES # BLD AUTO: 1 10^3/UL (ref 1.5–5)
LYMPHOCYTES NFR BLD AUTO: 19 % (ref 24–44)
MCH RBC QN AUTO: 31.4 PG (ref 27–33)
MCHC RBC AUTO-ENTMCNC: 33.2 G/DL (ref 32–36.5)
MCV RBC AUTO: 94.5 FL (ref 80–96)
MONOCYTES # BLD AUTO: 0.5 10^3/UL (ref 0–0.8)
MONOCYTES NFR BLD AUTO: 9.2 % (ref 2–8)
NEUTROPHILS # BLD AUTO: 3.6 10^3/UL (ref 1.5–8.5)
NEUTROPHILS NFR BLD AUTO: 67 % (ref 36–66)
PLATELET # BLD AUTO: 313 10^3/UL (ref 150–450)
POTASSIUM SERPL-SCNC: 4 MEQ/L (ref 3.5–5.1)
RBC # BLD AUTO: 3.47 10^6/UL (ref 4–5.4)
SODIUM SERPL-SCNC: 136 MEQ/L (ref 136–145)
WBC # BLD AUTO: 5.4 10^3/UL (ref 4–10)

## 2021-03-28 PROCEDURE — 0SRR0J9 REPLACEMENT OF RIGHT HIP JOINT, FEMORAL SURFACE WITH SYNTHETIC SUBSTITUTE, CEMENTED, OPEN APPROACH: ICD-10-PCS | Performed by: STUDENT IN AN ORGANIZED HEALTH CARE EDUCATION/TRAINING PROGRAM

## 2021-03-28 RX ADMIN — DEXTROSE AND SODIUM CHLORIDE SCH MLS/HR: 5; 900 INJECTION, SOLUTION INTRAVENOUS at 20:43

## 2021-03-28 RX ADMIN — INSULIN LISPRO SCH UNITS: 100 INJECTION, SOLUTION INTRAVENOUS; SUBCUTANEOUS at 00:00

## 2021-03-28 RX ADMIN — KETOROLAC TROMETHAMINE PRN MG: 30 INJECTION, SOLUTION INTRAMUSCULAR at 21:56

## 2021-03-28 RX ADMIN — KETOROLAC TROMETHAMINE PRN MG: 30 INJECTION, SOLUTION INTRAMUSCULAR at 00:08

## 2021-03-28 RX ADMIN — MONTELUKAST SODIUM SCH MG: 10 TABLET, FILM COATED ORAL at 18:00

## 2021-03-28 RX ADMIN — Medication SCH MG: at 12:00

## 2021-03-28 RX ADMIN — LABETALOL HCL SCH MG: 200 TABLET, FILM COATED ORAL at 12:20

## 2021-03-28 RX ADMIN — INSULIN LISPRO SCH UNITS: 100 INJECTION, SOLUTION INTRAVENOUS; SUBCUTANEOUS at 06:00

## 2021-03-28 RX ADMIN — PANTOPRAZOLE SODIUM SCH MG: 40 TABLET, DELAYED RELEASE ORAL at 09:00

## 2021-03-28 RX ADMIN — LABETALOL HCL SCH MG: 200 TABLET, FILM COATED ORAL at 09:00

## 2021-03-28 RX ADMIN — MULTIPLE VITAMINS W/ MINERALS TAB SCH TAB: TAB at 12:00

## 2021-03-28 RX ADMIN — ROSUVASTATIN CALCIUM SCH MG: 10 TABLET, FILM COATED ORAL at 18:00

## 2021-03-28 RX ADMIN — INSULIN LISPRO SCH UNITS: 100 INJECTION, SOLUTION INTRAVENOUS; SUBCUTANEOUS at 18:00

## 2021-03-28 RX ADMIN — INSULIN LISPRO SCH UNITS: 100 INJECTION, SOLUTION INTRAVENOUS; SUBCUTANEOUS at 21:00

## 2021-03-28 RX ADMIN — INSULIN LISPRO SCH UNITS: 100 INJECTION, SOLUTION INTRAVENOUS; SUBCUTANEOUS at 12:00

## 2021-03-28 RX ADMIN — KETOROLAC TROMETHAMINE PRN MG: 30 INJECTION, SOLUTION INTRAMUSCULAR at 08:16

## 2021-03-28 RX ADMIN — LABETALOL HCL SCH MG: 200 TABLET, FILM COATED ORAL at 09:26

## 2021-03-28 RX ADMIN — DOCUSATE SODIUM SCH MG: 100 CAPSULE, LIQUID FILLED ORAL at 08:11

## 2021-03-28 RX ADMIN — ESCITALOPRAM OXALATE SCH MG: 10 TABLET, FILM COATED ORAL at 18:00

## 2021-03-28 RX ADMIN — MAGNESIUM OXIDE SCH MG: 400 TABLET ORAL at 12:00

## 2021-03-28 RX ADMIN — DOCUSATE SODIUM SCH MG: 100 CAPSULE, LIQUID FILLED ORAL at 20:39

## 2021-03-28 RX ADMIN — DEXTROSE AND SODIUM CHLORIDE SCH MLS/HR: 5; 900 INJECTION, SOLUTION INTRAVENOUS at 00:08

## 2021-03-28 RX ADMIN — LABETALOL HCL SCH MG: 200 TABLET, FILM COATED ORAL at 20:39

## 2021-03-28 RX ADMIN — POLYETHYLENE GLYCOL 3350 SCH PKT: 17 POWDER, FOR SOLUTION ORAL at 09:00

## 2021-03-28 NOTE — REP
INDICATION:

RIGHT HIP HEMIARTHROPLASTY.



COMPARISON:

Comparison pelvic radiograph March 26, 2021..



TECHNIQUE:

Portably obtained supine AP view of the pelvis.  Two views.



FINDINGS:

Patient is status post right hip hemiarthroplasty.  The arthroplasty is seen in good

position.  Lateral skin staples are seen.  An old a intramedullary jamila and femoral

neck pin transfixes a healed left intertrochanteric hip fracture.  The patient is

status post L4-5 lumbar spine fusion.



IMPRESSION:

Status post right hip hemiarthroplasty.





<Electronically signed by Edinson Morales > 03/28/21 2022

## 2021-03-28 NOTE — IPNPDOC
Subjective


Date Seen


The patient was seen on 3/28/21.





Subjective


Chief Complaint/HPI


No complaints this morning. Sleeping comfortably when i woke her up she did not 

want to talk much. Pain is controlled when she is not moving.





Objective


Physical Examination


General Exam:  Positive: Cooperative, No Acute Distress


Eye Exam:  Positive: PERRLA, Conjunctiva & lids normal, EOMI; 


   Negative: Sclera icteric


Neck Exam:  Positive: Supple; 


   Negative: JVD, thyromegaly


Chest Exam:  Positive: Clear to auscultation, Normal air movement


Heart Exam:  Positive: Rate Normal, Regular Rhythm, Normal S1, Normal S2; 


   Negative: Murmurs, Rubs


Abdomen Exam:  Positive: Normal bowel sounds, Soft; 


   Negative: Tenderness, Hepatospenomegaly


Extremity Exam:  Positive: Tenderness (right hip and groin); 


   Negative: Clubbing, Cyanosis, Edema





Assessment /Plan


Assessment


 is an 85 yr old with  a hx of MDD, HTN, gout, DLP, CAD/ CABG 2004, 

DM2, hypothyroidism, Hx of MDD was hospitalized in March 2017 / hx of 

electroshock therapy, Iron deficiency anemia, left proximal femur Fracture in 

2016, Vertebroplasty and multiple other surgeries had a mechanical fall about a 

week ago. On that day it did not seem too bad and she was able to ambulate a bit

but then it got worse over the past few days and then she could no longer 

mobilize so called EMS and was brought to the ED. She was found to have right 

femur fx.





Mechanical fall resulting in right femur fx


planned for OR on 3/28/21 at 11:30 am. 


pain control with ketorolac, morphine


post op diet as per ortho





Hypertension


continue labetelol and will give amlodipine. will hold lisinopril preop. 





Hx of MDD 


Escitalopram





Gout


Allopurinol





Dyslipidemia / Coronary artery disease /CABG


Labetalol, Rosuvastatin





Diabetes mellitus type 2


A1c 6.1


on metformin at home 


Now NPO on Dex/ ns infusion


Hypoglycemia protocol 


Sliding scale insulin post op. 





Hypothyroidism


Levothyroxine





DVT prophylaxis as per ortho.





Plan/VTE


VTE Prophylaxis Ordered?:  Yes





VS, I&O, 24H, Fishbone


Vital Signs/I&O





Vital Signs








  Date Time  Temp Pulse Resp B/P (MAP) Pulse Ox O2 Delivery O2 Flow Rate FiO2


 


3/28/21 08:00 97.4 54 17 156/70 (98) 95 Room Air  














I&O- Last 24 Hours up to 6 AM 


 


 3/28/21





 06:00


 


Intake Total 1840 ml


 


Output Total 300 ml


 


Balance 1540 ml











Laboratory Data


24H LABS


Laboratory Tests 2


3/27/21 12:45: Bedside Glucose (Misc Panel) 107


3/27/21 16:17: Bedside Glucose (Misc Panel) 90


3/27/21 23:51: Bedside Glucose (Misc Panel) 113H


3/28/21 04:01: Bedside Glucose (Misc Panel) 111H


3/28/21 06:11: 


Immature Granulocyte % (Auto) 0.7, Neutrophils (%) (Auto) 67.0H, Lymphocytes (%)

(Auto) 19.0L, Monocytes (%) (Auto) 9.2H, Eosinophils (%) (Auto) 2.8, Basophils 

(%) (Auto) 1.3H, Neutrophils # (Auto) 3.6, Lymphocytes # (Auto) 1.0L, Monocytes 

# (Auto) 0.5, Eosinophils # (Auto) 0.2, Basophils # (Auto) 0.1, Nucleated Red 

Blood Cells % (auto) 0.0, Anion Gap 6L, Glomerular Filtration Rate > 60.0, 

Calcium Level 8.8


CBC/BMP


Laboratory Tests


3/28/21 06:11

















LESLEY MANNING MD                   Mar 28, 2021 11:00

## 2021-03-29 VITALS — SYSTOLIC BLOOD PRESSURE: 118 MMHG | DIASTOLIC BLOOD PRESSURE: 65 MMHG

## 2021-03-29 VITALS — SYSTOLIC BLOOD PRESSURE: 120 MMHG | DIASTOLIC BLOOD PRESSURE: 57 MMHG

## 2021-03-29 VITALS — SYSTOLIC BLOOD PRESSURE: 139 MMHG | DIASTOLIC BLOOD PRESSURE: 63 MMHG

## 2021-03-29 VITALS — SYSTOLIC BLOOD PRESSURE: 137 MMHG | DIASTOLIC BLOOD PRESSURE: 91 MMHG

## 2021-03-29 VITALS — DIASTOLIC BLOOD PRESSURE: 55 MMHG | SYSTOLIC BLOOD PRESSURE: 110 MMHG

## 2021-03-29 VITALS — DIASTOLIC BLOOD PRESSURE: 63 MMHG | SYSTOLIC BLOOD PRESSURE: 136 MMHG

## 2021-03-29 VITALS — DIASTOLIC BLOOD PRESSURE: 63 MMHG | SYSTOLIC BLOOD PRESSURE: 122 MMHG

## 2021-03-29 LAB
BASOPHILS # BLD AUTO: 0 10^3/UL (ref 0–0.2)
BASOPHILS NFR BLD AUTO: 0.5 % (ref 0–1)
BUN SERPL-MCNC: 16 MG/DL (ref 7–18)
CALCIUM SERPL-MCNC: 8.1 MG/DL (ref 8.8–10.2)
CHLORIDE SERPL-SCNC: 102 MEQ/L (ref 98–107)
CO2 SERPL-SCNC: 28 MEQ/L (ref 21–32)
CREAT SERPL-MCNC: 0.55 MG/DL (ref 0.55–1.3)
EOSINOPHIL # BLD AUTO: 0 10^3/UL (ref 0–0.5)
EOSINOPHIL NFR BLD AUTO: 0.5 % (ref 0–3)
GFR SERPL CREATININE-BSD FRML MDRD: > 60 ML/MIN/{1.73_M2} (ref 32–?)
GLUCOSE SERPL-MCNC: 127 MG/DL (ref 70–100)
HCT VFR BLD AUTO: 27.8 % (ref 36–47)
HGB BLD-MCNC: 9.2 G/DL (ref 12–15.5)
LYMPHOCYTES # BLD AUTO: 0.9 10^3/UL (ref 1.5–5)
LYMPHOCYTES NFR BLD AUTO: 9.9 % (ref 24–44)
MCH RBC QN AUTO: 32.1 PG (ref 27–33)
MCHC RBC AUTO-ENTMCNC: 33.1 G/DL (ref 32–36.5)
MCV RBC AUTO: 96.9 FL (ref 80–96)
MONOCYTES # BLD AUTO: 0.6 10^3/UL (ref 0–0.8)
MONOCYTES NFR BLD AUTO: 6.6 % (ref 2–8)
NEUTROPHILS # BLD AUTO: 7.1 10^3/UL (ref 1.5–8.5)
NEUTROPHILS NFR BLD AUTO: 81.9 % (ref 36–66)
PLATELET # BLD AUTO: 336 10^3/UL (ref 150–450)
POTASSIUM SERPL-SCNC: 4.3 MEQ/L (ref 3.5–5.1)
RBC # BLD AUTO: 2.87 10^6/UL (ref 4–5.4)
SODIUM SERPL-SCNC: 135 MEQ/L (ref 136–145)
WBC # BLD AUTO: 8.7 10^3/UL (ref 4–10)

## 2021-03-29 RX ADMIN — INSULIN LISPRO SCH UNITS: 100 INJECTION, SOLUTION INTRAVENOUS; SUBCUTANEOUS at 20:44

## 2021-03-29 RX ADMIN — INSULIN LISPRO SCH UNITS: 100 INJECTION, SOLUTION INTRAVENOUS; SUBCUTANEOUS at 16:45

## 2021-03-29 RX ADMIN — SODIUM CHLORIDE, PRESERVATIVE FREE SCH ML: 5 INJECTION INTRAVENOUS at 20:45

## 2021-03-29 RX ADMIN — ACETAMINOPHEN SCH MG: 500 TABLET ORAL at 18:00

## 2021-03-29 RX ADMIN — LABETALOL HCL SCH MG: 200 TABLET, FILM COATED ORAL at 08:53

## 2021-03-29 RX ADMIN — LEVOTHYROXINE SODIUM SCH MCG: 25 TABLET ORAL at 05:50

## 2021-03-29 RX ADMIN — INSULIN LISPRO SCH UNITS: 100 INJECTION, SOLUTION INTRAVENOUS; SUBCUTANEOUS at 07:30

## 2021-03-29 RX ADMIN — PANTOPRAZOLE SODIUM SCH MG: 40 TABLET, DELAYED RELEASE ORAL at 08:53

## 2021-03-29 RX ADMIN — SODIUM CHLORIDE SCH MLS/HR: 9 INJECTION, SOLUTION INTRAVENOUS at 14:37

## 2021-03-29 RX ADMIN — Medication SCH MG: at 11:47

## 2021-03-29 RX ADMIN — MONTELUKAST SODIUM SCH MG: 10 TABLET, FILM COATED ORAL at 18:00

## 2021-03-29 RX ADMIN — DOCUSATE SODIUM SCH MG: 100 CAPSULE, LIQUID FILLED ORAL at 20:47

## 2021-03-29 RX ADMIN — ACETAMINOPHEN SCH MG: 500 TABLET ORAL at 14:37

## 2021-03-29 RX ADMIN — MULTIPLE VITAMINS W/ MINERALS TAB SCH TAB: TAB at 11:47

## 2021-03-29 RX ADMIN — ESCITALOPRAM OXALATE SCH MG: 10 TABLET, FILM COATED ORAL at 18:00

## 2021-03-29 RX ADMIN — DOCUSATE SODIUM SCH MG: 100 CAPSULE, LIQUID FILLED ORAL at 08:52

## 2021-03-29 RX ADMIN — POLYETHYLENE GLYCOL 3350 SCH PKT: 17 POWDER, FOR SOLUTION ORAL at 08:50

## 2021-03-29 RX ADMIN — SODIUM CHLORIDE SCH MLS/HR: 9 INJECTION, SOLUTION INTRAVENOUS at 01:00

## 2021-03-29 RX ADMIN — KETOROLAC TROMETHAMINE PRN MG: 30 INJECTION, SOLUTION INTRAMUSCULAR at 04:18

## 2021-03-29 RX ADMIN — SODIUM CHLORIDE, PRESERVATIVE FREE SCH ML: 5 INJECTION INTRAVENOUS at 13:04

## 2021-03-29 RX ADMIN — ROSUVASTATIN CALCIUM SCH MG: 10 TABLET, FILM COATED ORAL at 18:00

## 2021-03-29 RX ADMIN — LABETALOL HYDROCHLORIDE SCH MG: 100 TABLET, FILM COATED ORAL at 20:49

## 2021-03-29 RX ADMIN — ASPIRIN 81 MG CHEWABLE TABLET SCH MG: 81 TABLET CHEWABLE at 08:52

## 2021-03-29 RX ADMIN — MAGNESIUM OXIDE SCH MG: 400 TABLET ORAL at 11:47

## 2021-03-29 RX ADMIN — INSULIN LISPRO SCH UNITS: 100 INJECTION, SOLUTION INTRAVENOUS; SUBCUTANEOUS at 11:47

## 2021-03-29 NOTE — IPNPDOC
Text Note


Date of Service


The patient was seen on 3/29/21.





NOTE


SUBJECTIVE:


-No complaints this morning, pain is sufficiently controlled with current 

regimen


-Will start PT/OT this morning





OBJECTIVE:


General: Cooperative, No Acute Distress


Eye: PERRLA, Conjunctiva & lids normal, EOMI, anicteric sclerae


Neck: Supple, no JVD, no thyromegaly


Chest: Clear to auscultation, Normal air movement, no crackles or wheezing, 

breathing comfortably on room air


Heart: Rate Normal, Regular Rhythm, Normal S1, Normal S2, no m/r/g


Abdomen: Normal bowel sounds, soft, NTND


Extremities: Tender right hip with movement, dressing in place, otherwise 

without peripheral edema and warm well perfused extremities








Labs: Reviewed


WBC 8.7


Hgb 9.2


Platelets 336


Na 135


K 4.3


Cr 0.55





Assessment


85 yr old W with a hx of CAD s/p CABG in 2004, DM2, hypothyroidism, HTN, gout, 

HLD, SUNG, left proximal femur Fracture in 2016, Vertebroplasty and multiple 

other surgeries had a mechanical fall with R hip fracture now s/p R hip 

hemiarthroplasty on 3/28. 





Mechanical fall resulting in right femur fx


-s/p R hip hemiarthroplasty on 3/28, POD # 1


-pain control with scheduled acetaminophen 1gQ6H, ketorolac IV PRN, refuses 

morphine


-DVT ppx with ASA 81 per ortho recs





Hypertension


-continue labetelol at 100mg BID, increase amlodipine to 10mg QD





Hx of MDD 


-continue Escitalopram





Gout


-continue Allopurinol





Dyslipidemia / Coronary artery disease /CABG


-continue Rosuvastatin





Diabetes mellitus type 2


-Hypoglycemia protocol 


-Sliding scale insulin AC/HS


-FSBG AC/HS


-holding home metformin





Hypothyroidism


-continue Levothyroxine





DVT prophylaxis: daily ASA 81 per orthopedics rec.


Dispo: pending PT/OT for safe discharge planning





VSMonty, I+O


VSMonty, I+O


Laboratory Tests


3/29/21 05:39











Vital Signs








  Date Time  Temp Pulse Resp B/P (MAP) Pulse Ox O2 Delivery O2 Flow Rate FiO2


 


3/29/21 07:43 95.9 60 17 120/57 (78) 98 Room Air  


 


3/28/21 20:00       2.0 


 


3/28/21 19:19        100














I&O- Last 24 Hours up to 6 AM 


 


 3/29/21





 06:00


 


Intake Total 2890 ml


 


Output Total 1075 ml


 


Balance 1815 ml

















EDWARD MEDRANO MD   Mar 29, 2021 08:22

## 2021-03-29 NOTE — RO
OPERATIVE NOTE



DATE OF OPERATION: 03/28/2021



TIME: 4 p.m.



PREOPERATIVE DIAGNOSIS:  Garden type IV femoral neck fracture.



POSTOPERATIVE DIAGNOSIS: Garden type IV femoral neck fracture.



NAME OF OPERATION:  Right hip hemiarthroplasty.



SURGEON: Steve Hassan MD



ASSISTANT:  Unknown



SUPERVISING ATTENDING: Steve Hassan MD



FINDINGS: Fractured femoral neck, Garden type IV.



INDICATIONS: This is an 85-year-old who tripped and fell over PJ pants using

her cane about one week prior. The patient was a community ambulator with cane

use. She was admitted to the [facility] on the 26th of March, 2021 for right hip pain and inability to ambulate. The patient was having

severe right leg pain which caused her to transition to use of her walker. She

then exacerbated this pain with the occurrence of fall. She describes the pain

as 8/10 in severity when patient presented to [facility] for

further evaluation and treatment. 



PAST MEDICAL HISTORY:  Includes history of nonadherence to treatment

recommendations, hypertension, gout, dyslipidemia, coronary artery disease,

iron deficiency anemia, diabetes and hypothyroidism.



SURGICAL HISTORY: Coronary artery bypass surgery in 2004, hysterectomy,

tonsillectomy, cholecystectomy, appendectomy, nailing of the left hip and

vertebroplasty. 



SOCIAL HISTORY: Nonsmoker, nondrinker, non-IV drug user. 



MEDICATION ALLERGIES: Please see internal medicine note.



The patient was indicated for a right hip hemiarthroplasty given her inability

to ambulate. 



ANESTHESIA: GETA.



TOURNIQUET TIME: None used.



ESTIMATED BLOOD LOSS: 300 mL.



IV FLUIDS: Please see anesthesia report.



IV ANTIBIOTICS:  3 grams of Ancef.



IMPLANTS: Synthes.



CULTURES: None.



SPECIMENS: None.



DESCRIPTION OF PROCEDURE: The patient was met in the preoperative holding area

where the patient's operative extremity was signed, the patient's consent was

confirmed to be correct, and the patient's identity was confirmed to be

correct. The patient was then transported to the operating theater where she

was placed in the lateral decubitus position using the Bieber hip set. The

safety straps secured the patient to the bed. All bony prominences were well

padded. The contralateral lower extremity had an SCD placed. A timeout was

called to confirm the correct patient, correct operative extremity and correct

consent. All staff was in agreement. The patient was then draped in the usual

sterile fashion. We then began the procedure.



The procedure began by marking out the anatomical landmarks overlying the

greater trochanter. The skin was sharply incised using a scalpel. We then used

electrocautery to make our way to the iliotibial band. This was sharply

incised, extended distally and proximally approximately 3 inches proximal and

distal to the greater trochanter. We then placed a Charnley retractor inside

the iliotibial band. This exposed the greater trochanter bursa which was

removed using electrocautery and then identified the patient's greater

trochanter, the insertion of the gluteus medius as well as the proximal aspect

of the vastus lateralis. Using the greater trochanter as my guide, I incised

the gluteus medius insertion in a longitudinal fashion centrally located on the

greater trochanter. This was extended in line with the gluteus medius

striations of the musculature proximally approximately 3 cm, then extended this

incision involving the proximal aspect of the vastus lateralis. The gluteus

medius and proximal aspect of the vastus lateralis was elevated from the

anterior femoral neck using electrocautery. This was elevated in order to

expose the femoral neck fracture. This was then tagged using #2 braided

polyethylene suture. I then incised the capsule sharply using a scalpel which

then exposed the fractured femoral head. To this point in time, I was able to

visualize both the fracture of the femoral neck as well as the free-floating

femoral head which was still remaining within the acetabulum. I performed a

femoral neck cut 12 mm proximal from the lesser trochanter. I then removed the

residual osseous fragments using a joker. I then removed the femoral head using

a corkscrew drill bit.  The femoral head was sized to a 46 mm which was then

trialed with appropriate suction seal fit. At this point in time, I adducted,

extended and externally rotated the patient's right femur in order to expose

the calcar region of the previous proximal femur cut. This allowed me to

approach appropriately. I placed a mother-in-law retractor proximally to the

gluteus medius insertion along the greater trochanter in order to protect the

gluteus medius abductor insertion on the greater trochanter. I used a box

cutter to remove osseous fragmentation from the greater trochanter as well as

to gain entry into the proximal intramedullary canal. I used a lateralizer

reamer to widen the proximal aspect of the intramedullary canal and then

broached sequentially from a 1 to a 5 which had appropriate fit. I then trialed

a #5 femoral implant, standard offset, 0 mm femoral neck spacer and a 46 mm

femoral head. This had stability throughout the super physiologic range of

motion, had a good suction seal and had restoration of anatomic contralateral

limb length. We then dislocated the hip and prepared the proximal femur for

cementation. I cleaned the proximal aspect of the femoral shaft using a

specialized suction device. I trialed a cement restrictor to the appropriate

size. We then prepared the cement and introduced the cement into the proximal

femur canal using fourth generation cementation technique. We then placed our

size 5 Synthes femoral implant, allowed the cement to dry and placed a 46 mm

femoral head on the implant with a 0 mm femoral neck spacer. We then reduced

the hip, ranged it through super physiologic range of motion of which it was

stable. It also had achieved equal limb length to the contralateral side. At

this point in time, I introduced one liter of Betadine soaked normal saline

into the patient's surgical site for three minutes. The patient was then

suctioned. We copiously irrigated the surgical site. I reapproximated the

patient's capsule using a #2 FiberWire suture and then reapproximated the

gluteus medius and vastus lateralis using a Stratafix suture. I reapproximated

the iliotibial band using a Stratafix suture in addition to #2 braided

polyethylene suture. Once again we copiously irrigated the surgical site,

closed the dermal layer using 2-0 Vicryl and closed the skin using metallic

staples. We then obtained the AP pelvis radiograph, demonstrating appropriate

limb length, appropriate cement mantle and a reduced straight hip. I placed

Xeroform over the surgical incision followed by 4x4s, ABD pads and Medipore

tape. The patient was extubated without complication, transported to the

postanesthesia care unit.



The patient will be weightbearing as tolerated to the right lower extremity.

She will follow up in a week and a half for a postoperative wound check on the

6th of April, 2021 with myself, Dr. Hassan at [facility]

orthopedic group. She will follow the right hemiarthroplasty rehabilitation

protocol. She will be given appropriate DVT chemoprophylaxis per hospitalist's

recommendations.  However, orthopedics recommends 81 mg of aspirin if she is

not already on previously prescribed DVT chemoprophylaxis. We also recommend

appropriate pain management per the hospitalist's recommendations. The

patient's daughter will be counseled on the aforementioned findings.

## 2021-03-30 VITALS — DIASTOLIC BLOOD PRESSURE: 70 MMHG | SYSTOLIC BLOOD PRESSURE: 164 MMHG

## 2021-03-30 VITALS — DIASTOLIC BLOOD PRESSURE: 75 MMHG | SYSTOLIC BLOOD PRESSURE: 171 MMHG

## 2021-03-30 VITALS — DIASTOLIC BLOOD PRESSURE: 67 MMHG | SYSTOLIC BLOOD PRESSURE: 151 MMHG

## 2021-03-30 VITALS — DIASTOLIC BLOOD PRESSURE: 70 MMHG | SYSTOLIC BLOOD PRESSURE: 150 MMHG

## 2021-03-30 VITALS — DIASTOLIC BLOOD PRESSURE: 68 MMHG | SYSTOLIC BLOOD PRESSURE: 160 MMHG

## 2021-03-30 VITALS — DIASTOLIC BLOOD PRESSURE: 78 MMHG | SYSTOLIC BLOOD PRESSURE: 170 MMHG

## 2021-03-30 VITALS — DIASTOLIC BLOOD PRESSURE: 84 MMHG | SYSTOLIC BLOOD PRESSURE: 159 MMHG

## 2021-03-30 LAB
BASOPHILS # BLD AUTO: 0 10^3/UL (ref 0–0.2)
BASOPHILS NFR BLD AUTO: 0.6 % (ref 0–1)
BUN SERPL-MCNC: 11 MG/DL (ref 7–18)
CALCIUM SERPL-MCNC: 8.2 MG/DL (ref 8.8–10.2)
CHLORIDE SERPL-SCNC: 106 MEQ/L (ref 98–107)
CO2 SERPL-SCNC: 28 MEQ/L (ref 21–32)
CREAT SERPL-MCNC: 0.53 MG/DL (ref 0.55–1.3)
EOSINOPHIL # BLD AUTO: 0.1 10^3/UL (ref 0–0.5)
EOSINOPHIL NFR BLD AUTO: 1.5 % (ref 0–3)
GFR SERPL CREATININE-BSD FRML MDRD: > 60 ML/MIN/{1.73_M2} (ref 32–?)
GLUCOSE SERPL-MCNC: 120 MG/DL (ref 70–100)
HCT VFR BLD AUTO: 26 % (ref 36–47)
HGB BLD-MCNC: 8.6 G/DL (ref 12–15.5)
LYMPHOCYTES # BLD AUTO: 1 10^3/UL (ref 1.5–5)
LYMPHOCYTES NFR BLD AUTO: 15.2 % (ref 24–44)
MCH RBC QN AUTO: 31.6 PG (ref 27–33)
MCHC RBC AUTO-ENTMCNC: 33.1 G/DL (ref 32–36.5)
MCV RBC AUTO: 95.6 FL (ref 80–96)
MONOCYTES # BLD AUTO: 0.5 10^3/UL (ref 0–0.8)
MONOCYTES NFR BLD AUTO: 7.3 % (ref 2–8)
NEUTROPHILS # BLD AUTO: 5.1 10^3/UL (ref 1.5–8.5)
NEUTROPHILS NFR BLD AUTO: 74.8 % (ref 36–66)
PLATELET # BLD AUTO: 332 10^3/UL (ref 150–450)
POTASSIUM SERPL-SCNC: 3.9 MEQ/L (ref 3.5–5.1)
RBC # BLD AUTO: 2.72 10^6/UL (ref 4–5.4)
SODIUM SERPL-SCNC: 139 MEQ/L (ref 136–145)
WBC # BLD AUTO: 6.8 10^3/UL (ref 4–10)

## 2021-03-30 RX ADMIN — INSULIN LISPRO SCH UNITS: 100 INJECTION, SOLUTION INTRAVENOUS; SUBCUTANEOUS at 07:30

## 2021-03-30 RX ADMIN — INSULIN LISPRO SCH UNITS: 100 INJECTION, SOLUTION INTRAVENOUS; SUBCUTANEOUS at 12:00

## 2021-03-30 RX ADMIN — PANTOPRAZOLE SODIUM SCH MG: 40 TABLET, DELAYED RELEASE ORAL at 08:23

## 2021-03-30 RX ADMIN — SODIUM CHLORIDE, PRESERVATIVE FREE SCH ML: 5 INJECTION INTRAVENOUS at 05:08

## 2021-03-30 RX ADMIN — ESCITALOPRAM OXALATE SCH MG: 10 TABLET, FILM COATED ORAL at 18:00

## 2021-03-30 RX ADMIN — ASPIRIN 81 MG CHEWABLE TABLET SCH MG: 81 TABLET CHEWABLE at 09:00

## 2021-03-30 RX ADMIN — ACETAMINOPHEN SCH MG: 500 TABLET ORAL at 00:19

## 2021-03-30 RX ADMIN — LABETALOL HYDROCHLORIDE SCH MG: 100 TABLET, FILM COATED ORAL at 09:00

## 2021-03-30 RX ADMIN — DOCUSATE SODIUM SCH MG: 100 CAPSULE, LIQUID FILLED ORAL at 08:23

## 2021-03-30 RX ADMIN — INSULIN LISPRO SCH UNITS: 100 INJECTION, SOLUTION INTRAVENOUS; SUBCUTANEOUS at 20:35

## 2021-03-30 RX ADMIN — ACETAMINOPHEN SCH MG: 500 TABLET ORAL at 12:40

## 2021-03-30 RX ADMIN — INSULIN LISPRO SCH UNITS: 100 INJECTION, SOLUTION INTRAVENOUS; SUBCUTANEOUS at 17:30

## 2021-03-30 RX ADMIN — KETOROLAC TROMETHAMINE PRN MG: 30 INJECTION, SOLUTION INTRAMUSCULAR at 05:21

## 2021-03-30 RX ADMIN — SODIUM CHLORIDE, PRESERVATIVE FREE SCH ML: 5 INJECTION INTRAVENOUS at 21:31

## 2021-03-30 RX ADMIN — ACETAMINOPHEN SCH MG: 500 TABLET ORAL at 23:12

## 2021-03-30 RX ADMIN — MULTIPLE VITAMINS W/ MINERALS TAB SCH TAB: TAB at 12:00

## 2021-03-30 RX ADMIN — SODIUM CHLORIDE, PRESERVATIVE FREE SCH ML: 5 INJECTION INTRAVENOUS at 13:26

## 2021-03-30 RX ADMIN — ACETAMINOPHEN SCH MG: 500 TABLET ORAL at 18:00

## 2021-03-30 RX ADMIN — LABETALOL HYDROCHLORIDE SCH MG: 100 TABLET, FILM COATED ORAL at 21:30

## 2021-03-30 RX ADMIN — ROSUVASTATIN CALCIUM SCH MG: 10 TABLET, FILM COATED ORAL at 18:00

## 2021-03-30 RX ADMIN — MAGNESIUM OXIDE SCH MG: 400 TABLET ORAL at 12:00

## 2021-03-30 RX ADMIN — POLYETHYLENE GLYCOL 3350 SCH PKT: 17 POWDER, FOR SOLUTION ORAL at 08:23

## 2021-03-30 RX ADMIN — KETOROLAC TROMETHAMINE PRN MG: 30 INJECTION, SOLUTION INTRAMUSCULAR at 21:31

## 2021-03-30 RX ADMIN — MONTELUKAST SODIUM SCH MG: 10 TABLET, FILM COATED ORAL at 18:00

## 2021-03-30 RX ADMIN — ACETAMINOPHEN SCH MG: 500 TABLET ORAL at 05:08

## 2021-03-30 RX ADMIN — LEVOTHYROXINE SODIUM SCH MCG: 25 TABLET ORAL at 05:07

## 2021-03-30 RX ADMIN — Medication SCH MG: at 12:00

## 2021-03-30 RX ADMIN — DOCUSATE SODIUM SCH MG: 100 CAPSULE, LIQUID FILLED ORAL at 21:31

## 2021-03-30 NOTE — IPNPDOC
Text Note


Date of Service


The patient was seen on 3/30/21.





NOTE


SUBJECTIVE:


-Depressed, refusing meds and barely participating with PT


-Pain is sometimes sufficiently treated





OBJECTIVE:


General: Cooperative, No Acute Distress


Eye: PERRLA, Conjunctiva & lids normal, EOMI, anicteric sclerae


Neck: Supple, no JVD, no thyromegaly


Chest: Clear to auscultation, Normal air movement, no crackles or wheezing, 

breathing comfortably on room air


Heart: Rate Normal, Regular Rhythm, Normal S1, Normal S2, no m/r/g


Abdomen: Normal bowel sounds, soft, NTND


Extremities: Tender right hip with movement, dressing in place, otherwise 

without peripheral edema and warm well perfused extremities


Psych: flat affect, depressed mood, refusing all care, alert and oriented x 3, 

wants to go home but cannot chart path on how we will achieve that shared goal





Labs: Reviewed


WBC 8.7


Hgb 9.2


Platelets 336


Na 135


K 4.3


Cr 0.55





Assessment


85 yr old W with a hx of CAD s/p CABG in 2004, DM2, hypothyroidism, HTN, gout, 

HLD, SUNG, left proximal femur Fracture in 2016, Vertebroplasty and multiple 

other surgeries had a mechanical fall with R hip fracture now s/p R hip 

hemiarthroplasty on 3/28. 





Mechanical fall resulting in right femur fx


-s/p R hip hemiarthroplasty on 3/28, POD # 2


-pain control with scheduled acetaminophen 1gQ6H, ketorolac IV PRN, refuses morp

souleymane


-DVT ppx with ASA 81 per ortho recs





Hypertension


-continue labetelol at 100mg BID, amlodipine 10mg QD





Hx of MDD 


-continue Escitalopram


-consult psych for depression, flat affect, refusal of care





Gout


-continue Allopurinol





Dyslipidemia / Coronary artery disease /CABG


-continue Rosuvastatin





Diabetes mellitus type 2


-Hypoglycemia protocol 


-Sliding scale insulin AC/HS


-FSBG AC/HS


-holding home metformin





Hypothyroidism


-continue Levothyroxine





DVT prophylaxis: daily ASA 81 per orthopedics rec.


Dispo: pending psych consult, refusing PT/OT for safe discharge planning





VS,Fishbone, I+O


VS, Fishbone, I+O


Laboratory Tests


3/30/21 07:45











Vital Signs








  Date Time  Temp Pulse Resp B/P (MAP) Pulse Ox O2 Delivery O2 Flow Rate FiO2


 


3/30/21 08:11    164/70 (101)    


 


3/30/21 07:34 97.4 61 18  97 Room Air  


 


3/28/21 20:00       2.0 


 


3/28/21 19:19        100














I&O- Last 24 Hours up to 6 AM 


 


 3/30/21





 06:00


 


Intake Total 760 ml


 


Output Total 900 ml


 


Balance -140 ml

















EDWARD MEDRANO MD   Mar 30, 2021 08:46

## 2021-03-31 ENCOUNTER — HOSPITAL ENCOUNTER (INPATIENT)
Dept: HOSPITAL 53 - M PM&R | Age: 86
LOS: 19 days | Discharge: HOME HEALTH | DRG: 561 | End: 2021-04-19
Attending: PHYSICAL MEDICINE & REHABILITATION | Admitting: PHYSICAL MEDICINE & REHABILITATION
Payer: MEDICARE

## 2021-03-31 VITALS — DIASTOLIC BLOOD PRESSURE: 70 MMHG | SYSTOLIC BLOOD PRESSURE: 155 MMHG

## 2021-03-31 VITALS — SYSTOLIC BLOOD PRESSURE: 170 MMHG | DIASTOLIC BLOOD PRESSURE: 75 MMHG

## 2021-03-31 VITALS — HEIGHT: 61 IN | BODY MASS INDEX: 26.76 KG/M2 | WEIGHT: 141.76 LBS

## 2021-03-31 VITALS — SYSTOLIC BLOOD PRESSURE: 140 MMHG | DIASTOLIC BLOOD PRESSURE: 63 MMHG

## 2021-03-31 VITALS — DIASTOLIC BLOOD PRESSURE: 68 MMHG | SYSTOLIC BLOOD PRESSURE: 151 MMHG
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
## 2021-03-31 DIAGNOSIS — Z79.899: ICD-10-CM

## 2021-03-31 DIAGNOSIS — E11.9: ICD-10-CM

## 2021-03-31 DIAGNOSIS — Z79.82: ICD-10-CM

## 2021-03-31 DIAGNOSIS — E78.5: ICD-10-CM

## 2021-03-31 DIAGNOSIS — Z88.5: ICD-10-CM

## 2021-03-31 DIAGNOSIS — Z90.49: ICD-10-CM

## 2021-03-31 DIAGNOSIS — D50.9: ICD-10-CM

## 2021-03-31 DIAGNOSIS — Z95.1: ICD-10-CM

## 2021-03-31 DIAGNOSIS — I10: ICD-10-CM

## 2021-03-31 DIAGNOSIS — M10.9: ICD-10-CM

## 2021-03-31 DIAGNOSIS — E03.9: ICD-10-CM
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
## 2021-03-31 DIAGNOSIS — G24.01: ICD-10-CM

## 2021-03-31 DIAGNOSIS — I25.10: ICD-10-CM

## 2021-03-31 DIAGNOSIS — S72.091D: Primary | ICD-10-CM

## 2021-03-31 DIAGNOSIS — F32.9: ICD-10-CM

## 2021-03-31 LAB
BASOPHILS # BLD AUTO: 0.1 10^3/UL (ref 0–0.2)
BASOPHILS NFR BLD AUTO: 0.8 % (ref 0–1)
BUN SERPL-MCNC: 10 MG/DL (ref 7–18)
CALCIUM SERPL-MCNC: 8.3 MG/DL (ref 8.8–10.2)
CHLORIDE SERPL-SCNC: 102 MEQ/L (ref 98–107)
CO2 SERPL-SCNC: 30 MEQ/L (ref 21–32)
CREAT SERPL-MCNC: 0.45 MG/DL (ref 0.55–1.3)
EOSINOPHIL # BLD AUTO: 0.1 10^3/UL (ref 0–0.5)
EOSINOPHIL NFR BLD AUTO: 1.3 % (ref 0–3)
GFR SERPL CREATININE-BSD FRML MDRD: > 60 ML/MIN/{1.73_M2} (ref 32–?)
GLUCOSE SERPL-MCNC: 116 MG/DL (ref 70–100)
HCT VFR BLD AUTO: 26.4 % (ref 36–47)
HGB BLD-MCNC: 8.8 G/DL (ref 12–15.5)
LYMPHOCYTES # BLD AUTO: 1.1 10^3/UL (ref 1.5–5)
LYMPHOCYTES NFR BLD AUTO: 14.2 % (ref 24–44)
MCH RBC QN AUTO: 31.7 PG (ref 27–33)
MCHC RBC AUTO-ENTMCNC: 33.3 G/DL (ref 32–36.5)
MCV RBC AUTO: 95 FL (ref 80–96)
MONOCYTES # BLD AUTO: 0.4 10^3/UL (ref 0–0.8)
MONOCYTES NFR BLD AUTO: 5.1 % (ref 2–8)
NEUTROPHILS # BLD AUTO: 5.9 10^3/UL (ref 1.5–8.5)
NEUTROPHILS NFR BLD AUTO: 78.1 % (ref 36–66)
PLATELET # BLD AUTO: 339 10^3/UL (ref 150–450)
POTASSIUM SERPL-SCNC: 3.6 MEQ/L (ref 3.5–5.1)
RBC # BLD AUTO: 2.78 10^6/UL (ref 4–5.4)
SODIUM SERPL-SCNC: 135 MEQ/L (ref 136–145)
WBC # BLD AUTO: 7.6 10^3/UL (ref 4–10)

## 2021-03-31 RX ADMIN — INSULIN LISPRO SCH UNITS: 100 INJECTION, SOLUTION INTRAVENOUS; SUBCUTANEOUS at 20:34

## 2021-03-31 RX ADMIN — HYDRALAZINE HYDROCHLORIDE SCH MG: 20 INJECTION INTRAMUSCULAR; INTRAVENOUS at 20:37

## 2021-03-31 RX ADMIN — POLYETHYLENE GLYCOL 3350 SCH PKT: 17 POWDER, FOR SOLUTION ORAL at 08:59

## 2021-03-31 RX ADMIN — INSULIN LISPRO SCH UNITS: 100 INJECTION, SOLUTION INTRAVENOUS; SUBCUTANEOUS at 12:20

## 2021-03-31 RX ADMIN — INSULIN LISPRO SCH UNITS: 100 INJECTION, SOLUTION INTRAVENOUS; SUBCUTANEOUS at 17:47

## 2021-03-31 RX ADMIN — MULTIPLE VITAMINS W/ MINERALS TAB SCH TAB: TAB at 11:11

## 2021-03-31 RX ADMIN — ACETAMINOPHEN SCH MG: 500 TABLET ORAL at 18:00

## 2021-03-31 RX ADMIN — HYDRALAZINE HYDROCHLORIDE SCH MG: 20 INJECTION INTRAMUSCULAR; INTRAVENOUS at 08:59

## 2021-03-31 RX ADMIN — SODIUM CHLORIDE, PRESERVATIVE FREE SCH ML: 5 INJECTION INTRAVENOUS at 05:45

## 2021-03-31 RX ADMIN — GABAPENTIN SCH MG: 100 CAPSULE ORAL at 20:38

## 2021-03-31 RX ADMIN — LEVOTHYROXINE SODIUM SCH MCG: 25 TABLET ORAL at 05:45

## 2021-03-31 RX ADMIN — MONTELUKAST SODIUM SCH MG: 10 TABLET, FILM COATED ORAL at 18:00

## 2021-03-31 RX ADMIN — GABAPENTIN SCH MG: 100 CAPSULE ORAL at 14:35

## 2021-03-31 RX ADMIN — ESCITALOPRAM OXALATE SCH MG: 10 TABLET, FILM COATED ORAL at 18:00

## 2021-03-31 RX ADMIN — SODIUM CHLORIDE, PRESERVATIVE FREE SCH ML: 5 INJECTION INTRAVENOUS at 12:20

## 2021-03-31 RX ADMIN — MAGNESIUM OXIDE SCH MG: 400 TABLET ORAL at 11:10

## 2021-03-31 RX ADMIN — SODIUM CHLORIDE, PRESERVATIVE FREE SCH ML: 5 INJECTION INTRAVENOUS at 20:38

## 2021-03-31 RX ADMIN — INSULIN LISPRO SCH UNITS: 100 INJECTION, SOLUTION INTRAVENOUS; SUBCUTANEOUS at 07:30

## 2021-03-31 RX ADMIN — KETOROLAC TROMETHAMINE PRN MG: 30 INJECTION, SOLUTION INTRAMUSCULAR at 04:19

## 2021-03-31 RX ADMIN — Medication SCH MG: at 11:11

## 2021-03-31 RX ADMIN — ROSUVASTATIN CALCIUM SCH MG: 10 TABLET, FILM COATED ORAL at 17:47

## 2021-03-31 RX ADMIN — DEXTROSE MONOHYDRATE SCH MG: 50 INJECTION, SOLUTION INTRAVENOUS at 09:00

## 2021-03-31 RX ADMIN — ACETAMINOPHEN SCH MG: 500 TABLET ORAL at 05:44

## 2021-03-31 RX ADMIN — DOCUSATE SODIUM SCH MG: 100 CAPSULE, LIQUID FILLED ORAL at 08:59

## 2021-03-31 RX ADMIN — KETOROLAC TROMETHAMINE PRN MG: 30 INJECTION, SOLUTION INTRAMUSCULAR at 11:09

## 2021-03-31 RX ADMIN — HYDRALAZINE HYDROCHLORIDE SCH MG: 20 INJECTION INTRAMUSCULAR; INTRAVENOUS at 15:18

## 2021-03-31 RX ADMIN — ASPIRIN 81 MG CHEWABLE TABLET SCH MG: 81 TABLET CHEWABLE at 08:59

## 2021-03-31 RX ADMIN — ACETAMINOPHEN SCH MG: 500 TABLET ORAL at 11:11

## 2021-03-31 RX ADMIN — DOCUSATE SODIUM SCH MG: 100 CAPSULE, LIQUID FILLED ORAL at 20:38

## 2021-03-31 NOTE — IPNPDOC
Text Note


Date of Service


The patient was seen on 3/31/21.





NOTE


SUBJECTIVE:


-Mood is much improved. Sitting up in chair, eating breakfast, talkative. 

Pleasant





OBJECTIVE:


General: Cooperative, No Acute Distress


Eye: PERRLA, Conjunctiva & lids normal, EOMI, anicteric sclerae


Neck: Supple, no JVD, no thyromegaly


Chest: Clear to auscultation, Normal air movement, no crackles or wheezing, 

breathing comfortably on room air


Heart: Rate Normal, Regular Rhythm, Normal S1, Normal S2, no m/r/g


Abdomen: Normal bowel sounds, soft, NTND


Extremities: Tender right hip with movement, dressing in place, otherwise 

without peripheral edema and warm well perfused extremities


Psych: AOx3, affect has normalized, pleasant, cooperative





Labs: Reviewed, stable





Assessment


85 yr old W with a hx of CAD s/p CABG in 2004, DM2, hypothyroidism, HTN, gout, 

HLD, SUNG, left proximal femur Fracture in 2016, Vertebroplasty and multiple 

other surgeries had a mechanical fall with R hip fracture now s/p R hip 

hemiarthroplasty on 3/28. 





Mechanical fall resulting in right femur fx


-s/p R hip hemiarthroplasty on 3/28, POD # 3


-pain control with scheduled acetaminophen 1gQ6H, ketorolac IV PRN


-DVT ppx with ASA 81 per ortho recs 





Hypertension


-Continue labetelol at 100mg BID, amlodipine 10mg QD


-will not need the hydral IV that I had ordered i/s/o of refusal of all PO meds





Hx of MDD 


-continue Escitalopram


-consulted psych for depression, improved without further intervention this 

morning.





Gout


-continue Allopurinol





Dyslipidemia / Coronary artery disease /CABG


-continue Rosuvastatin





Diabetes mellitus type 2


-Hypoglycemia protocol 


-Sliding scale insulin AC/HS


-FSBG AC/HS


-holding home metformin while inpatient





Hypothyroidism


-continue Levothyroxine





DVT prophylaxis: daily ASA 81 per orthopedics rec


Dispo: ARU





VS,Monty, I+O


VSMonty, I+O


Laboratory Tests


3/31/21 05:51











Vital Signs








  Date Time  Temp Pulse Resp B/P (MAP) Pulse Ox O2 Delivery O2 Flow Rate FiO2


 


3/31/21 07:27 98.9 65 18 170/75 (106) 96 Room Air  


 


3/28/21 20:00       2.0 


 


3/28/21 19:19        100














I&O- Last 24 Hours up to 6 AM 


 


 3/31/21





 06:00


 


Intake Total 450 ml


 


Output Total 825 ml


 


Balance -375 ml

















EDWARD MEDRANO MD   Mar 31, 2021 08:35

## 2021-03-31 NOTE — MHCR
[facility] CONSULTATION

DATE: 03/30/2021



This is a video assessment, patient is aware of it, we are doing this because

of the pandemic. She is seen in the presence of staff at the hospital,

Progressive Care Unit.



CHIEF COMPLAINT:   Says feels okay.



SUBJECTIVE: She is 85 years old, she just had hip surgery, as she had tripped

and was using a cane, surgery was done by Dr. Hassan, impacted femur neck,

right hip. This was done two days ago, March 28th. I have been asked to see her

by the Hospitalist, as the patient has a history of depression, and for the

last day or so has not been taking her medicines, apparently has not been

adhering to recommendations including treatments, with Physical Therapy. 



She has had a history of depression, and I saw her for a few visits at the

Outpatient Clinic, about three years or so ago, after she was discharged from

the inpatient Psychiatry Unit. She was treated for major depression, was at

that time on Lexapro and Ritalin. She was discharged from the clinic, to

follow-up with primary care, and she is currently on Lexapro, 20 mg daily. She

is not on Ritalin anymore, I am not sure when that was stopped, and neither

does the patient.



She is also on trazodone at 50 mg at night. She says she has been doing okay,

says there are times when she feels depressed, denies that she feels very

depressed at present, says she eats when she feels hungry, and that she ate her

evening meal. She does say she has pain at times. 



Denies any suicidal thoughts or intents. 



She recalls seeing me in the past, but could not give a very accurate timeframe

but thought it was a few years ago.



PAST PSYCHIATRIC HISTORY: Has had a history of depression which has been

recurrent, inpatient hospitalization as well, and apparently had ECT treatments

about 17 or 18 years ago. As far as I am aware, was admitted to inpatient

psychiatry the last time about 3 1/2 years ago or so. She saw me at the

outpatient clinic for a few visits, at that point she was on Lexapro. She was

also on Ritalin at 10 mg twice a day.



She had received the Ritalin at the inpatient unit, in addition to the Lexapro

at that time.



It had seemed to help with her mood. 



MEDICAL HISTORY:  History of hypertension, gout, coronary artery disease, iron

deficiency anemia, hypothyroidism, diabetes mellitus Type 2, cholecystectomy,

and appendectomy. 



MEDICATIONS: Please see the list. These include Norvasc 10 mg daily, Labetalol

100 mg twice a day, insulin, aspirin, magnesium oxide, Lexapro 20 mg daily,

which she refused for the last few days. 



She is also on trazodone at 50 mg at night, and she refused the last couple of

days. 



MENTAL STATUS EXAM: She is lying in bed, she is neat. She is cooperative

generally, otherwise no psychomotor retardation, answers questions briefly and

coherently. Affect is restricted but reactive. Denies any suicidal thoughts or

intents. No evidence of any thoughts of harming anyone else, no evidence of any

psychosis either. She is alert, oriented to time, place, place and person. No

fluctuation of consciousness. Judgment is good. Insight is fair. 



ASSESSMENT: Major depressive disorder, recurrent, also mild to moderate

currently.



Status post surgery. 



History of non-adherence to treatment and recommendations.



It is possible she may be depressed, but the extent of the depressed mood, in

terms of length, are unclear, I am not aware of her pre-morbid state,

preoperatively. She has been refusing medicines, suggests has taken something

to eat this evening. 



RECOMMENDATIONS: I would suggest continuing the Lexapro 20 mg daily. 



Unclear if trazodone has any use, as it is felt not to be useful for sleep,

would suggest discarding it.  At that dose, 50 mg at night, it is not an

antidepressant dose, and would not recommend it to be used as an

antidepressant, as it is quite sedating. 



I would suggest non-pharmacological interventions for now, persuading the

patient, consistently, including possibly having a family member assist with

that, in terms of persuading the patient. 



Should the current stay be prolonged, the next few days, I would suggest

considering using Ritalin at a low dose, 5 mg daily, unless there are medical

contraindications. Using a psychostimulant, if there are no contraindications,

off-label use, may help with a quicker more robust response in terms of mood. 



Non-pharmacological ways, as stated earlier, are preferable, however, at this

point as well.



Thank you for the consult. If you have any questions, please call.



The assessment took 30 minutes.

## 2021-03-31 NOTE — DS.PDOC
Discharge Summary


General


Date of Admission


Mar 26, 2021 at 21:57


Date of Discharge


3/31/2021


Attending Physician:  EDWARD MEDRANO MD





Discharge Summary


PROCEDURES PERFORMED DURING STAY: R hip hemiarthroplasty





ADMITTING DIAGNOSES: 


R hip fracture





DISCHARGE DIAGNOSES:


Acute impacted cervical neck fracture of right hip


Hypertensive urgency


Hx of MDD was hospitalized in March 2017 / hx of electroshock therapy 


History of nonadherence to treatment recommendations/medical regimen


Hypertension


Gout


Dyslipidemia


Coronary artery disease


Iron deficiency anemia


Diabetes mellitus type 2


Hypothyroidism





COMPLICATIONS/CHIEF COMPLAINT: Femur Fracture, Right, Hypertensive Urgency.





HISTORY OF PRESENT ILLNESS: 


85 yr old W who presented to the ED after she tripped and fell over her pajama 

pants while using her cane about 1 week prior to presentation. She began having 

right leg pain but had been using her walker, unfortunately she also developed 

left leg pain therefore she decided to come to the hospital for evaluation. At 

its worst the pain is 8/10 in severity. She doesnt' want morphine because of a 

hx of adverse reactions to it. 








HOSPITAL COURSE: 


In the ED she was found to have a R hip fracture and was admitted to medicine 

for pre-op evaluation and pain control with orthopedics consultation. She had a 

R hip hemiarthroplasty by orthopedics on 3/28 that went well. Her post-op course

was c/b severe episode of depression with refusal of all meds and PT and psych 

was consulted. She eventually improved on 3/31 and was cooperative with PT, 

nursing, diet and meds and is now being discharged to ARU for continued 

rehabilitation before final discharge home. 





DISCHARGE MEDICATIONS: Please see below.


 


ALLERGIES: Please see below.





PHYSICAL EXAMINATION ON DISCHARGE:


VITAL SIGNS: Please see below.


General: Cooperative, No Acute Distress


Eye: PERRLA, Conjunctiva & lids normal, EOMI, anicteric sclerae


Neck: Supple, no JVD, no thyromegaly


Chest: Clear to auscultation, Normal air movement, no crackles or wheezing, 

breathing comfortably on room air


Heart: Rate Normal, Regular Rhythm, Normal S1, Normal S2, no m/r/g


Abdomen: Normal bowel sounds, soft, NTND


Extremities: Tender right hip with movement, dressing in place, otherwise 

without peripheral edema and warm well perfused extremities


Psych: Alert and oriented x 3, pleasant, cooperative





LABORATORY DATA: Please see below.





IMAGING: 


XRAY knee IMPRESSION: Osteoarthritis and diffuse osteoporosis.  No fracture or 

other acute abnormality.


XRAY hip IMPRESSION: Acute impacted cervical neck fracture right hip.  Old 

pending left hip.  No pelvic fracture seen.


CT abd/pelvis IMPRESSION:  1. Acute, complete, impacted, displaced Garden type 

4 right subcapital femur fracture. 2. Intact abdominal and pelvic viscera. 3. 

Thickening of the wall of the stomach, which may be secondary to its 

decompressed state versus gastritis. 


Chest xray IMPRESSION: Mild cardiomegaly.  Prior sternotomy and thoracic 

vertebral plasty.  No acute abnormality seen.





PROGNOSIS: Good   





ACTIVITY: As tolerated, per PM&R





DIET: 2g sodium





DISCHARGE PLAN: ARU





DISPOSITION: ARU





DISCHARGE INSTRUCTIONS:


To ARU





ITEMS TO FOLLOWUP ON ON OUTPATIENT:


Orthopedics and PCP follow up. Psychiatry for depression





DISCHARGE CONDITION: Stable





TIME SPENT ON DISCHARGE: 34 minutes.





Vital Signs/I&Os





Vital Signs








  Date Time  Temp Pulse Resp B/P (MAP) Pulse Ox O2 Delivery O2 Flow Rate FiO2


 


3/31/21 08:59    170/75    


 


3/31/21 07:27 98.9 65 18  96 Room Air  


 


3/28/21 20:00       2.0 


 


3/28/21 19:19        100














I&O- Last 24 Hours up to 6 AM 


 


 3/31/21





 06:00


 


Intake Total 450 ml


 


Output Total 825 ml


 


Balance -375 ml











Laboratory Data


Labs 24H


Laboratory Tests 2


3/30/21 11:24: Bedside Glucose (Misc Panel) 148H


3/30/21 16:35: Bedside Glucose (Misc Panel) 146H


3/30/21 20:17: Bedside Glucose (Misc Panel) 132H


3/31/21 05:51: 


Immature Granulocyte % (Auto) 0.5, Neutrophils (%) (Auto) 78.1H, Lymphocytes (%)

(Auto) 14.2L, Monocytes (%) (Auto) 5.1, Eosinophils (%) (Auto) 1.3, Basophils 

(%) (Auto) 0.8, Neutrophils # (Auto) 5.9, Lymphocytes # (Auto) 1.1L, Monocytes #

(Auto) 0.4, Eosinophils # (Auto) 0.1, Basophils # (Auto) 0.1, Nucleated Red 

Blood Cells % (auto) 0.0, Anion Gap 3L, Glomerular Filtration Rate > 60.0, 

Calcium Level 8.3L


CBC/BMP


Laboratory Tests


3/31/21 05:51








FSBS





Laboratory Tests








Test


 3/30/21


11:24 3/30/21


16:35 3/30/21


20:17 Range/Units


 


 


Bedside Glucose (Misc Panel) 148 146 132   MG/DL











Discharge Medications


Scheduled


Acetaminophen (Acetaminophen) 500 Mg Tablet, 1,000 MG PO Q6H


Allopurinol (Zyloprim) 300 Mg Tab, 300 MG PO DAILY, (Reported)


Ascorbic Acid (Vitamin C) 500 Mg Capsule, 500 MG PO DAILY, (Reported)


Aspirin (Children's Aspirin) 81 Mg Tab.chew, 81 MG PO DAILY


Calcium Carbonate/Vitamin D3 (Calcium 500-Vit D3 200 Caplet) 1 Tab Tab, 1 TAB PO

DAILY, (Reported)


   TAKES AT LUNCHTIME 


Docusate Sodium (Stool Softener) 100 Mg Capsule, 100 MG PO BID, (Reported)


Escitalopram Oxalate (Lexapro) 20 Mg Tablet, 20 MG PO QPM, (Reported)


Ferrous Sulfate (Ferrous Sulfate) 325 Mg Tab, 325 MG PO DAILY, (Reported)


Labetalol HCl (Labetalol HCl) 200 Mg Tab, 200 MG PO BID, (Reported)


Levothyroxine Sodium (Levothyroxine Sodium) 25 Mcg Tab, 25 MCG PO DAILY, 

(Reported)


Lisinopril (Lisinopril) 5 Mg Tab, 5 MG PO QPM, (Reported)


Magnesium Oxide (Magnesium Oxide) 400 Mg Tablet, 400 MG PO DAILY, (Reported)


   NOON 


Metformin HCl (Metformin ER Osmotic) 500 Mg Tab, 500 MG PO BID, (Reported)


Montelukast Sodium (Singulair) 10 Mg Tab, 10 MG PO QPM, (Reported)


Multivitamins (Thera M Plus Tablet) 1 Tab Tab, 1 TAB PO DAILY, (Reported)


   TAKES AT LUNCHTIME 


Pantoprazole Sodium (Pantoprazole Sodium) 40 Mg Tab, 40 MG PO DAILY, (Reported)


Rosuvastatin Calcium (Crestor) 5 Mg Tab, 5 MG PO QPM, (Reported)


Trazodone HCl (Trazodone HCl) 50 Mg Tab, 50 MG PO QPM, (Reported)





Allergies


Coded Allergies:  


     tramadol (Unverified  Allergy, Unknown, 3/26/21)











EDWARD MEDRANO MD   Mar 31, 2021 11:07

## 2021-04-01 VITALS — SYSTOLIC BLOOD PRESSURE: 124 MMHG | DIASTOLIC BLOOD PRESSURE: 54 MMHG

## 2021-04-01 VITALS — SYSTOLIC BLOOD PRESSURE: 158 MMHG | DIASTOLIC BLOOD PRESSURE: 62 MMHG

## 2021-04-01 VITALS — DIASTOLIC BLOOD PRESSURE: 70 MMHG | SYSTOLIC BLOOD PRESSURE: 160 MMHG

## 2021-04-01 VITALS — SYSTOLIC BLOOD PRESSURE: 149 MMHG | DIASTOLIC BLOOD PRESSURE: 75 MMHG

## 2021-04-01 VITALS — DIASTOLIC BLOOD PRESSURE: 56 MMHG | SYSTOLIC BLOOD PRESSURE: 124 MMHG

## 2021-04-01 VITALS — SYSTOLIC BLOOD PRESSURE: 142 MMHG | DIASTOLIC BLOOD PRESSURE: 67 MMHG

## 2021-04-01 VITALS — DIASTOLIC BLOOD PRESSURE: 60 MMHG | SYSTOLIC BLOOD PRESSURE: 131 MMHG

## 2021-04-01 LAB
APPEARANCE UR: CLEAR
BACTERIA UR QL AUTO: NEGATIVE
BASOPHILS # BLD AUTO: 0.1 10^3/UL (ref 0–0.2)
BASOPHILS NFR BLD AUTO: 0.8 % (ref 0–1)
BILIRUB UR QL STRIP.AUTO: NEGATIVE
BUN SERPL-MCNC: 14 MG/DL (ref 7–18)
CALCIUM SERPL-MCNC: 8.5 MG/DL (ref 8.8–10.2)
CHLORIDE SERPL-SCNC: 100 MEQ/L (ref 98–107)
CO2 SERPL-SCNC: 25 MEQ/L (ref 21–32)
CREAT SERPL-MCNC: 0.44 MG/DL (ref 0.55–1.3)
EOSINOPHIL # BLD AUTO: 0.1 10^3/UL (ref 0–0.5)
EOSINOPHIL NFR BLD AUTO: 1.4 % (ref 0–3)
GFR SERPL CREATININE-BSD FRML MDRD: > 60 ML/MIN/{1.73_M2} (ref 32–?)
GLUCOSE SERPL-MCNC: 116 MG/DL (ref 70–100)
GLUCOSE UR QL STRIP.AUTO: NEGATIVE MG/DL
HCT VFR BLD AUTO: 29.4 % (ref 36–47)
HGB BLD-MCNC: 9.9 G/DL (ref 12–15.5)
HGB UR QL STRIP.AUTO: NEGATIVE
KETONES UR QL STRIP.AUTO: (no result) MG/DL
LEUKOCYTE ESTERASE UR QL STRIP.AUTO: NEGATIVE
LYMPHOCYTES # BLD AUTO: 1.1 10^3/UL (ref 1.5–5)
LYMPHOCYTES NFR BLD AUTO: 10.8 % (ref 24–44)
MCH RBC QN AUTO: 31.8 PG (ref 27–33)
MCHC RBC AUTO-ENTMCNC: 33.7 G/DL (ref 32–36.5)
MCV RBC AUTO: 94.5 FL (ref 80–96)
MONOCYTES # BLD AUTO: 0.4 10^3/UL (ref 0–0.8)
MONOCYTES NFR BLD AUTO: 4.1 % (ref 2–8)
MUCOUS THREADS URNS QL MICRO: (no result)
NEUTROPHILS # BLD AUTO: 8.2 10^3/UL (ref 1.5–8.5)
NEUTROPHILS NFR BLD AUTO: 82.3 % (ref 36–66)
NITRITE UR QL STRIP.AUTO: NEGATIVE
PH UR STRIP.AUTO: 7 UNITS (ref 5–9)
PLATELET # BLD AUTO: 475 10^3/UL (ref 150–450)
POTASSIUM SERPL-SCNC: 3.9 MEQ/L (ref 3.5–5.1)
PROT UR QL STRIP.AUTO: NEGATIVE MG/DL
RBC # BLD AUTO: 3.11 10^6/UL (ref 4–5.4)
RBC # UR AUTO: 2 /HPF (ref 0–3)
SODIUM SERPL-SCNC: 134 MEQ/L (ref 136–145)
SP GR UR STRIP.AUTO: 1.02 (ref 1–1.03)
SQUAMOUS #/AREA URNS AUTO: 0 /HPF (ref 0–6)
UROBILINOGEN UR QL STRIP.AUTO: 2 MG/DL (ref 0–2)
WBC # BLD AUTO: 10 10^3/UL (ref 4–10)
WBC #/AREA URNS AUTO: 0 /HPF (ref 0–3)

## 2021-04-01 RX ADMIN — SODIUM CHLORIDE, PRESERVATIVE FREE SCH ML: 5 INJECTION INTRAVENOUS at 13:33

## 2021-04-01 RX ADMIN — MAGNESIUM OXIDE SCH MG: 400 TABLET ORAL at 12:00

## 2021-04-01 RX ADMIN — LEVOTHYROXINE SODIUM SCH MCG: 25 TABLET ORAL at 05:49

## 2021-04-01 RX ADMIN — ESCITALOPRAM OXALATE SCH MG: 10 TABLET, FILM COATED ORAL at 16:45

## 2021-04-01 RX ADMIN — DOCUSATE SODIUM SCH MG: 100 CAPSULE, LIQUID FILLED ORAL at 09:00

## 2021-04-01 RX ADMIN — LABETALOL HYDROCHLORIDE SCH MG: 100 TABLET, FILM COATED ORAL at 20:47

## 2021-04-01 RX ADMIN — SODIUM CHLORIDE, PRESERVATIVE FREE SCH ML: 5 INJECTION INTRAVENOUS at 21:22

## 2021-04-01 RX ADMIN — ROSUVASTATIN CALCIUM SCH MG: 10 TABLET, FILM COATED ORAL at 16:45

## 2021-04-01 RX ADMIN — ACETAMINOPHEN SCH MG: 500 TABLET ORAL at 05:49

## 2021-04-01 RX ADMIN — POLYETHYLENE GLYCOL 3350 SCH PKT: 17 POWDER, FOR SOLUTION ORAL at 09:00

## 2021-04-01 RX ADMIN — HYDRALAZINE HYDROCHLORIDE SCH MG: 20 INJECTION INTRAMUSCULAR; INTRAVENOUS at 15:34

## 2021-04-01 RX ADMIN — GABAPENTIN SCH MG: 100 CAPSULE ORAL at 09:00

## 2021-04-01 RX ADMIN — ACETAMINOPHEN SCH MG: 500 TABLET ORAL at 00:00

## 2021-04-01 RX ADMIN — ASPIRIN 81 MG CHEWABLE TABLET SCH MG: 81 TABLET CHEWABLE at 09:00

## 2021-04-01 RX ADMIN — HYDRALAZINE HYDROCHLORIDE SCH MG: 20 INJECTION INTRAMUSCULAR; INTRAVENOUS at 10:02

## 2021-04-01 RX ADMIN — ACETAMINOPHEN SCH MG: 500 TABLET ORAL at 16:44

## 2021-04-01 RX ADMIN — KETOROLAC TROMETHAMINE PRN MG: 30 INJECTION, SOLUTION INTRAMUSCULAR at 10:10

## 2021-04-01 RX ADMIN — INSULIN LISPRO SCH UNITS: 100 INJECTION, SOLUTION INTRAVENOUS; SUBCUTANEOUS at 12:00

## 2021-04-01 RX ADMIN — GABAPENTIN SCH MG: 100 CAPSULE ORAL at 20:07

## 2021-04-01 RX ADMIN — INSULIN LISPRO SCH UNITS: 100 INJECTION, SOLUTION INTRAVENOUS; SUBCUTANEOUS at 07:30

## 2021-04-01 RX ADMIN — INSULIN LISPRO SCH UNITS: 100 INJECTION, SOLUTION INTRAVENOUS; SUBCUTANEOUS at 16:45

## 2021-04-01 RX ADMIN — ACETAMINOPHEN SCH MG: 500 TABLET ORAL at 23:39

## 2021-04-01 RX ADMIN — DEXTROSE MONOHYDRATE SCH MG: 50 INJECTION, SOLUTION INTRAVENOUS at 10:02

## 2021-04-01 RX ADMIN — MULTIPLE VITAMINS W/ MINERALS TAB SCH TAB: TAB at 12:00

## 2021-04-01 RX ADMIN — ACETAMINOPHEN SCH MG: 500 TABLET ORAL at 12:00

## 2021-04-01 RX ADMIN — HYDRALAZINE HYDROCHLORIDE SCH MG: 20 INJECTION INTRAMUSCULAR; INTRAVENOUS at 03:40

## 2021-04-01 RX ADMIN — DOCUSATE SODIUM SCH MG: 100 CAPSULE, LIQUID FILLED ORAL at 20:07

## 2021-04-01 RX ADMIN — INSULIN LISPRO SCH UNITS: 100 INJECTION, SOLUTION INTRAVENOUS; SUBCUTANEOUS at 20:00

## 2021-04-01 RX ADMIN — MONTELUKAST SODIUM SCH MG: 10 TABLET, FILM COATED ORAL at 16:45

## 2021-04-01 RX ADMIN — Medication SCH MG: at 12:00

## 2021-04-01 RX ADMIN — SODIUM CHLORIDE, PRESERVATIVE FREE SCH ML: 5 INJECTION INTRAVENOUS at 06:41

## 2021-04-01 NOTE — SMCUROLCON
Urology Consultation


General


Date of Consultation


4/1/21


Reason For Consultation


This patient is seen for request by hospitalist for bell catheter placement.





History of Present Illness


The patient is an 85 year old female post surgical repair of right femur 

fracture on 3-29-21. I was contacted this morning by the hospitalist  and asked 

to place a  bell catheter after the nursing staff was unable to do so. The trey

ent cannot answer any questions. She is disoriented.





Past Medical History


Medical History


unobtainable from the patient, please see the prior notes in the chart.





Medications


Current Medications





Current Medications








 Medications


  (Trade)  Dose


 Ordered  Sig/Lucrecia


 Route


 PRN Reason  Start Time


 Stop Time Status Last Admin


Dose Admin


 


 Acetaminophen


  (Tylenol Tab)  650 mg  Q4H  PRN


 PO


 PAIN OR FEVER  3/26/21 21:45


 3/29/21 12:24 DC 3/26/21 22:05





 


 Acetaminophen


  (Tylenol Tab)  1,000 mg  Q6H


 PO


   3/29/21 12:00


    3/31/21 11:11





 


 Al Hydrox/Mg


 Hydrox/Simethicone


  (Mylanta)  30 ml  DAILY  PRN


 PO


 DYSPEPSIA  3/26/21 21:45


     





 


 Allopurinol


  (Zyloprim)  300 mg  DAILY


 PO


   3/27/21 09:00


    3/31/21 08:59





 


 Amlodipine


 Besylate


  (Norvasc)  5 mg  DAILY


 PO


   3/28/21 09:00


 3/29/21 12:21 DC 3/29/21 08:52





 


 Amlodipine


 Besylate


  (Norvasc)  10 mg  DAILY


 PO


   3/30/21 09:00


 3/31/21 08:32 DC 3/30/21 12:34





 


 Aspirin


  (Aspirin


 Chewable)  81 mg  DAILY


 PO


   3/29/21 09:00


    3/31/21 08:59





 


 Bisacodyl


  (Dulcolax


 Suppository)  10 mg  DAILYPRN  PRN


 NM


 CONSTIPATION  3/31/21 14:30


     





 


 Calcium/Vitamin D


  (Oscal D)  500 mg  DAILY@1200


 PO


   3/27/21 12:00


    3/31/21 11:11





 


 Captopril


  (CAPOten)  6.25 mg  NOW


 PO


   3/26/21 21:45


 3/26/21 21:54 DC 3/26/21 22:06





 


 Dextrose


  (Dextrose 50%)  25 ml  ASDIRECTED  PRN


 IV


 SEE LABEL COMMENTS  3/26/21 21:45


     





 


 Dextrose/Sodium


 Chloride  1,000 ml @ 


 60 mls/hr  [id number]


 IV


   3/27/21 23:55


 3/29/21 04:45 DC 3/28/21 20:43





 


 Docusate Sodium


  (Colace)  100 mg  BID


 PO


   3/27/21 00:35


    3/31/21 08:59





 


 Escitalopram


 Oxalate


  (Lexapro)  20 mg  DAILY@1800


 PO


   3/27/21 00:35


    3/27/21 17:29





 


 Fentanyl Citrate


  (Sublimaze)  25 mcg  Q5MP  PRN


 IV


 PAIN LEVEL 5-10  3/28/21 19:35


 3/28/21 20:34 DC 3/28/21 19:35





 


 Gabapentin


  (Neurontin)  100 mg  BID


 PO


   3/31/21 14:35


     





 


 Glucagon


  (Glucagon)  1 mg  ASDIRECTED  PRN


 SC


 SEE LABEL COMMENTS  3/26/21 21:45


     





 


 Glucose


  (Glucose)  16 GM  ASDIRECTED  PRN


 PO


 SEE LABEL COMMENTS  3/26/21 21:45


     





 


 Home Med


  (Med Rec


 Complete!)    ASDIRECTED


 XX


   3/26/21 20:30


 3/26/21 20:31 DC  





 


 Hydralazine HCl


  (Apresoline)  10 mg  Q6H


 IV


   3/31/21 09:00


    4/1/21 03:40





 


 Hydromorphone HCl


  (Dilaudid)  0.2 mg  Q5MP  PRN


 IV


 PAIN LEVEL 4-7  3/28/21 19:35


 3/28/21 20:34 DC  





 


 Insulin Human


 Lispro


  (HumaLOG INSULIN)  SEE


 PROTOCOL


 TABLE  AC


 SC


   3/29/21 07:30


    3/31/21 17:47





 


 Insulin Human


 Lispro


  (HumaLOG INSULIN)  SEE


 PROTOCOL


 TABLE  QHS


 SC


   3/28/21 21:00


     





 


 Insulin Human


 Lispro


  (HumaLOG INSULIN)  See


 Protocol


 Table  ACHS


 SC


   3/28/21 22:25


 3/28/21 22:49 DC  





 


 Insulin Human


 Lispro


  (HumaLOG INSULIN)  See


 Protocol


 Table  Q6H


 SC


   3/27/21 00:00


 3/28/21 22:25 DC  





 


 Ketorolac


 Tromethamine


  (ToRADol)  15 mg  Q6H  PRN


 IV


 PAIN  3/27/21 16:30


 4/1/21 16:29  3/31/21 11:09





 


 Labetalol HCl


  (Normodyne,


 Trandate)  100 mg  BID


 PO


   3/29/21 21:00


 3/31/21 08:32 DC 3/30/21 21:30





 


 Labetalol HCl


  (Normodyne,


 Trandate)  200 mg  BID


 PO


   3/27/21 00:35


 3/29/21 12:21 DC 3/29/21 08:53





 


 Lactated Ringer's  1,000 ml @ 


 60 mls/hr  [id number]


 IV


   3/26/21 21:45


 3/27/21 11:26 DC 3/26/21 22:06





 


 Lactated Ringer's  1,000 ml @ 


 100 mls/hr  Q10H


 IV


   3/28/21 19:35


 3/28/21 20:34 DC  





 


 Levothyroxine


 Sodium


  (Synthroid)  25 mcg  DAILY


 PO


   3/27/21 09:00


 3/28/21 09:40 DC  





 


 Levothyroxine


 Sodium


  (Synthroid)  25 mcg  DAILY@0600


 PO


   3/29/21 06:00


    3/31/21 05:45





 


 Lorazepam


  (Ativan)  1 mg  STAT  STAT


 IV


   3/31/21 17:17


 3/31/21 17:22 DC 3/31/21 17:34





 


 Magnesium


 Hydroxide


  (Milk Of


 Magnesia)  30 ml  DAILY  PRN


 PO


 CONSTIPATION  3/26/21 21:45


    3/31/21 12:44





 


 Magnesium Oxide


  (Mag-Ox)  400 mg  DAILY@1200


 PO


   3/27/21 12:00


    3/31/21 11:10





 


 Montelukast Sodium


  (Singulair)  10 mg  DAILY@1800


 PO


   3/27/21 00:35


    3/27/21 17:29





 


 Morphine Sulfate


  (Morphine


 Sulfate Inj)  2 mg  Q2H  PRN


 IV


 pain  3/26/21 21:45


 3/30/21 16:39 DC  





 


 Multivitamins


  (Theragram-M)  1 tab  DAILY@1200


 PO


   3/27/21 12:00


    3/31/21 11:11





 


 Ondansetron HCl


  (ZOFRAN


 INJection)  4 mg  Q4HP  PRN


 IV


 NAUSEA OR VOMITING  3/28/21 19:35


 3/28/21 20:34 DC  





 


 Oxycodone HCl


  (Roxicodone,


 Oxyir)  5 mg  ASDIRECTED  PRN


 PO


 PAIN LEVEL 1-4  3/28/21 19:35


 3/28/21 20:34 DC  





 


 Pantoprazole


 Sodium


  (Protonix)  40 mg  DAILY


 PO


   3/27/21 09:00


 3/31/21 08:32 DC 3/29/21 08:53





 


 Pantoprazole


 Sodium


  (Protonix)  40 mg  Q24H


 IV


   3/31/21 09:00


    3/31/21 09:00





 


 Polyethylene


 Glycol


  (Miralax)  1 pkt  DAILY


 PO


   3/27/21 09:00


    3/31/21 08:59





 


 Rosuvastatin


 Calcium


  (Crestor)  5 mg  DAILY@1800


 PO


   3/27/21 00:35


    3/27/21 17:29





 


 Sodium Chloride  1,000 ml @ 


 100 mls/hr  Q10H


 IV


   3/29/21 12:20


 3/29/21 22:20 DC 3/29/21 01:00





 


 Sodium Chloride


  (Saline Lock


 Flush)  2 ml  ASDIRECTED  PRN


 IV


 SEE LABEL COMMENTS  3/29/21 09:05


     





 


 Sodium Chloride


  (Saline Lock


 Flush)  2 ml  SLF


 IV


   3/29/21 14:00


    4/1/21 06:41





 


 Trazodone HCl


  (Desyrel)  50 mg  DAILY@1800


 PO


   3/27/21 00:35


    3/28/21 20:39














Allergies


Allergies:  


Coded Allergies:  


     tramadol (Unverified  Allergy, Unknown, 3/26/21)





Review of Systems


General:  Reports: ROS Unobtainable





Physical Examination


General Exam:  Other (frail pale thin elderly female in bed, cannot answer 

questions)


Female  Exam


prepped the external genitalia with betadine, inserted a 16 [place] bell by 

palpation of the urethral meatus, clear urine returned, left in place to 

dependent drainage.





Vital Signs/I&O





Vital Signs








  Date Time  Temp Pulse Resp B/P (MAP) Pulse Ox O2 Delivery O2 Flow Rate FiO2


 


4/1/21 07:22 98.0 90 18 160/70 (100) 98 Room Air  


 


3/28/21 20:00       2.0 


 


3/28/21 19:19        100














I&O- Last 24 Hours up to 6 AM 


 


 4/1/21





 06:00


 


Intake Total 720 ml


 


Output Total 900 ml


 


Balance -180 ml











Laboratory Data


24H Labs


Laboratory Tests 2


3/31/21 12:15: Bedside Glucose (Misc Panel) 146H


3/31/21 17:43: Bedside Glucose (Misc Panel) 119H


3/31/21 20:34: Bedside Glucose (Misc Panel) 84


3/31/21 23:45: Bedside Glucose (Misc Panel) 88





Assessment


Urinary retention post op, difficult bell catheter placement





Plan


leave catheter in place until patient is ambulatory, then remove for a voiding 

trial and follow post void residuals by ultrasound











GHULAM STREETER MD              Apr 1, 2021 07:37

## 2021-04-01 NOTE — IPNPDOC
Text Note


Date of Service


The patient was seen on 4/1/21.





NOTE


SUBJECTIVE:


-Overnight had urinary retention and staff was unable to place bell, so urology

was consulted and placed bell


-Depressed, refusing meds and all treatment, wants to leave


-Sometimes seems confused





OBJECTIVE:


General: Cooperative, No Acute Distress


Eye: PERRLA, Conjunctiva & lids normal, EOMI, anicteric sclerae


Neck: Supple, no JVD, no thyromegaly


Chest: Clear to auscultation, Normal air movement, no crackles or wheezing, 

breathing comfortably on room air


Heart: Rate Normal, Regular Rhythm, Normal S1, Normal S2, no m/r/g


Abdomen: Normal bowel sounds, soft, NTND


Extremities: Tender right hip with movement, dressing in place, otherwise 

without peripheral edema and warm well perfused extremities


Psych: flat affect, depressed mood, refusing all care, wants to go home but 

cannot chart path on how we will achieve that shared goal





Labs: Reviewed





Assessment


85 yr old W with a hx of CAD s/p CABG in 2004, DM2, hypothyroidism, HTN, gout, 

HLD, SUNG, left proximal femur Fracture in 2016, Vertebroplasty and multiple 

other surgeries had a mechanical fall with R hip fracture now s/p R hip 

hemiarthroplasty on 3/28. 





Mechanical fall resulting in right femur fx


-s/p R hip hemiarthroplasty on 3/28, POD # 3


-pain control with scheduled acetaminophen 1gQ6H, ketorolac IV PRN, refuses 

morphine


-DVT ppx with ASA 81 per ortho recs





Hypertension


-Held labetelol at 100mg BID, amlodipine 10mg QD as she is refusing meds. Hydral

IV Q6H





Hx of MDD 


-continue Escitalopram, if she agrees to take it.


-consult psych for depression, flat affect, refusal of care





Gout


-continue Allopurinol,  if she agrees to take it.





Dyslipidemia / Coronary artery disease /CABG


-continue Rosuvastatin,  if she agrees to take it.





Diabetes mellitus type 2


-Hypoglycemia protocol 


-Sliding scale insulin AC/HS


-FSBG AC/HS


-holding home metformin





Hypothyroidism


-continue Levothyroxine,  if she agrees to take it.





DVT prophylaxis: daily ASA 81 per orthopedics rec.


Dispo: pending psych consult, refusing PT/OT for safe discharge planning





VS,Fishbone, I+O


VS, Fishbone, I+O


Laboratory Tests


4/1/21 07:56











Vital Signs








  Date Time  Temp Pulse Resp B/P (MAP) Pulse Ox O2 Delivery O2 Flow Rate FiO2


 


4/1/21 10:02    160/70    


 


4/1/21 07:22 98.0 90 18  98 Room Air  


 


3/28/21 20:00       2.0 


 


3/28/21 19:19        100














I&O- Last 24 Hours up to 6 AM 


 


 4/1/21





 06:00


 


Intake Total 720 ml


 


Output Total 900 ml


 


Balance -180 ml

















EDWARD MEDRANO MD    Apr 1, 2021 10:34

## 2021-04-02 VITALS — DIASTOLIC BLOOD PRESSURE: 60 MMHG | SYSTOLIC BLOOD PRESSURE: 127 MMHG

## 2021-04-02 VITALS — DIASTOLIC BLOOD PRESSURE: 68 MMHG | SYSTOLIC BLOOD PRESSURE: 156 MMHG

## 2021-04-02 VITALS — SYSTOLIC BLOOD PRESSURE: 156 MMHG | DIASTOLIC BLOOD PRESSURE: 64 MMHG

## 2021-04-02 VITALS — SYSTOLIC BLOOD PRESSURE: 133 MMHG | DIASTOLIC BLOOD PRESSURE: 68 MMHG

## 2021-04-02 VITALS — DIASTOLIC BLOOD PRESSURE: 56 MMHG | SYSTOLIC BLOOD PRESSURE: 128 MMHG

## 2021-04-02 VITALS — DIASTOLIC BLOOD PRESSURE: 71 MMHG | SYSTOLIC BLOOD PRESSURE: 136 MMHG

## 2021-04-02 LAB
BASOPHILS # BLD AUTO: 0.1 10^3/UL (ref 0–0.2)
BASOPHILS NFR BLD AUTO: 0.8 % (ref 0–1)
BUN SERPL-MCNC: 18 MG/DL (ref 7–18)
CALCIUM SERPL-MCNC: 7.8 MG/DL (ref 8.8–10.2)
CHLORIDE SERPL-SCNC: 101 MEQ/L (ref 98–107)
CO2 SERPL-SCNC: 23 MEQ/L (ref 21–32)
CREAT SERPL-MCNC: 0.78 MG/DL (ref 0.55–1.3)
EOSINOPHIL # BLD AUTO: 0.2 10^3/UL (ref 0–0.5)
EOSINOPHIL NFR BLD AUTO: 2 % (ref 0–3)
GFR SERPL CREATININE-BSD FRML MDRD: > 60 ML/MIN/{1.73_M2} (ref 32–?)
GLUCOSE SERPL-MCNC: 205 MG/DL (ref 70–100)
HCT VFR BLD AUTO: 25.7 % (ref 36–47)
HGB BLD-MCNC: 8.3 G/DL (ref 12–15.5)
LYMPHOCYTES # BLD AUTO: 1.4 10^3/UL (ref 1.5–5)
LYMPHOCYTES NFR BLD AUTO: 17.1 % (ref 24–44)
MCH RBC QN AUTO: 31.3 PG (ref 27–33)
MCHC RBC AUTO-ENTMCNC: 32.3 G/DL (ref 32–36.5)
MCV RBC AUTO: 97 FL (ref 80–96)
MONOCYTES # BLD AUTO: 0.6 10^3/UL (ref 0–0.8)
MONOCYTES NFR BLD AUTO: 6.7 % (ref 2–8)
NEUTROPHILS # BLD AUTO: 6.1 10^3/UL (ref 1.5–8.5)
NEUTROPHILS NFR BLD AUTO: 72.9 % (ref 36–66)
PLATELET # BLD AUTO: 415 10^3/UL (ref 150–450)
POTASSIUM SERPL-SCNC: 3.7 MEQ/L (ref 3.5–5.1)
RBC # BLD AUTO: 2.65 10^6/UL (ref 4–5.4)
SODIUM SERPL-SCNC: 134 MEQ/L (ref 136–145)
WBC # BLD AUTO: 8.4 10^3/UL (ref 4–10)

## 2021-04-02 RX ADMIN — ROSUVASTATIN CALCIUM SCH MG: 10 TABLET, FILM COATED ORAL at 17:27

## 2021-04-02 RX ADMIN — INSULIN LISPRO SCH UNITS: 100 INJECTION, SOLUTION INTRAVENOUS; SUBCUTANEOUS at 07:30

## 2021-04-02 RX ADMIN — MONTELUKAST SODIUM SCH MG: 10 TABLET, FILM COATED ORAL at 17:27

## 2021-04-02 RX ADMIN — ASPIRIN 81 MG CHEWABLE TABLET SCH MG: 81 TABLET CHEWABLE at 09:06

## 2021-04-02 RX ADMIN — POLYETHYLENE GLYCOL 3350 SCH PKT: 17 POWDER, FOR SOLUTION ORAL at 09:07

## 2021-04-02 RX ADMIN — MAGNESIUM OXIDE SCH MG: 400 TABLET ORAL at 12:08

## 2021-04-02 RX ADMIN — LEVOTHYROXINE SODIUM SCH MCG: 25 TABLET ORAL at 06:46

## 2021-04-02 RX ADMIN — INSULIN LISPRO SCH UNITS: 100 INJECTION, SOLUTION INTRAVENOUS; SUBCUTANEOUS at 12:07

## 2021-04-02 RX ADMIN — ACETAMINOPHEN SCH MG: 500 TABLET ORAL at 21:19

## 2021-04-02 RX ADMIN — INSULIN LISPRO SCH UNITS: 100 INJECTION, SOLUTION INTRAVENOUS; SUBCUTANEOUS at 21:00

## 2021-04-02 RX ADMIN — GABAPENTIN SCH MG: 100 CAPSULE ORAL at 21:19

## 2021-04-02 RX ADMIN — ACETAMINOPHEN SCH MG: 500 TABLET ORAL at 17:29

## 2021-04-02 RX ADMIN — DOCUSATE SODIUM SCH MG: 100 CAPSULE, LIQUID FILLED ORAL at 21:19

## 2021-04-02 RX ADMIN — ACETAMINOPHEN SCH MG: 500 TABLET ORAL at 12:08

## 2021-04-02 RX ADMIN — HYDRALAZINE HYDROCHLORIDE SCH MG: 25 TABLET, FILM COATED ORAL at 20:00

## 2021-04-02 RX ADMIN — MULTIPLE VITAMINS W/ MINERALS TAB SCH TAB: TAB at 12:08

## 2021-04-02 RX ADMIN — SODIUM CHLORIDE, PRESERVATIVE FREE SCH ML: 5 INJECTION INTRAVENOUS at 12:08

## 2021-04-02 RX ADMIN — LABETALOL HYDROCHLORIDE SCH MG: 100 TABLET, FILM COATED ORAL at 21:19

## 2021-04-02 RX ADMIN — SODIUM CHLORIDE, PRESERVATIVE FREE SCH ML: 5 INJECTION INTRAVENOUS at 06:48

## 2021-04-02 RX ADMIN — Medication SCH MG: at 12:08

## 2021-04-02 RX ADMIN — DOCUSATE SODIUM SCH MG: 100 CAPSULE, LIQUID FILLED ORAL at 09:07

## 2021-04-02 RX ADMIN — GABAPENTIN SCH MG: 100 CAPSULE ORAL at 09:06

## 2021-04-02 RX ADMIN — ESCITALOPRAM OXALATE SCH MG: 10 TABLET, FILM COATED ORAL at 17:27

## 2021-04-02 RX ADMIN — ACETAMINOPHEN SCH MG: 500 TABLET ORAL at 06:46

## 2021-04-02 RX ADMIN — SENNOSIDES SCH TAB: 8.6 TABLET, FILM COATED ORAL at 21:19

## 2021-04-02 RX ADMIN — DEXTROSE MONOHYDRATE SCH MG: 50 INJECTION, SOLUTION INTRAVENOUS at 09:05

## 2021-04-02 RX ADMIN — LABETALOL HYDROCHLORIDE SCH MG: 100 TABLET, FILM COATED ORAL at 09:06

## 2021-04-02 RX ADMIN — INSULIN LISPRO SCH UNITS: 100 INJECTION, SOLUTION INTRAVENOUS; SUBCUTANEOUS at 17:32

## 2021-04-02 RX ADMIN — WHITE PETROLATUM SCH DOSE: 57; 17 PASTE TOPICAL at 21:00

## 2021-04-02 NOTE — IPNPDOC
Text Note


Date of Service


The patient was seen on 4/2/21.





NOTE


SUBJECTIVE:


-Depressed, refusing meds and all treatment yesterday, was briefly redirectable 

when granddaughter was at bedside yesterday


-This morning reports feeling tired. Reported to me that she ate her breakfast 

and would like to sit up in the chair for lunch.








OBJECTIVE:


General: Cooperative, No Acute Distress


Eye: PERRLA, Conjunctiva & lids normal, EOMI, anicteric sclerae


Neck: Supple, no JVD, no thyromegaly


Chest: Clear to auscultation, Normal air movement, no crackles or wheezing, 

breathing comfortably on room air


Heart: Rate Normal, Regular Rhythm, Normal S1, Normal S2, no m/r/g


Abdomen: Normal bowel sounds, soft, NTND


Extremities: Tender right hip with movement, dressing in place, otherwise 

without peripheral edema and warm well perfused extremities


Psych: flat affect, depressed mood, refusing all care, wants to go home but 

cannot chart path on how we will achieve that shared goal





Labs: Reviewed





Assessment


85 yr old W with a hx of CAD s/p CABG in 2004, DM2, hypothyroidism, HTN, gout, 

HLD, SUNG, left proximal femur Fracture in 2016, Vertebroplasty and multiple 

other surgeries had a mechanical fall with R hip fracture now s/p R hip 

hemiarthroplasty on 3/28. 





Mechanical fall resulting in right femur fx


-s/p R hip hemiarthroplasty on 3/28, POD # 4


-pain control with scheduled acetaminophen 1gQ6H, ketorolac IV PRN, refuses 

morphine


-DVT ppx with ASA 81 per ortho recs





Hypertension


-Held labetelol at 100mg BID, amlodipine 10mg QD as she is refusing meds. Hydral

IV Q6H





Hx of MDD 


-continue Escitalopram, if she agrees to take it.


-consult psych for depression, flat affect, refusal of care





Gout


-continue Allopurinol,  if she agrees to take it.





Dyslipidemia / Coronary artery disease /CABG


-continue Rosuvastatin,  if she agrees to take it.





Diabetes mellitus type 2


-Hypoglycemia protocol 


-Sliding scale insulin AC/HS


-FSBG AC/HS


-holding home metformin





Hypothyroidism


-continue Levothyroxine,  if she agrees to take it.





DVT prophylaxis: daily ASA 81 per orthopedics rec.


Dispo: pending psych consult, refusing PT/OT for safe discharge planning





VS,Fishbone, I+O


VS, Fishbone, I+O


Laboratory Tests


4/2/21 05:20











Vital Signs








  Date Time  Temp Pulse Resp B/P (MAP) Pulse Ox O2 Delivery O2 Flow Rate FiO2


 


4/2/21 04:00 97.5 78 19 136/71 (92) 96 Room Air  


 


3/28/21 20:00       2.0 


 


3/28/21 19:19        100














I&O- Last 24 Hours up to 6 AM 


 


 4/2/21





 06:00


 


Intake Total 1400 ml


 


Output Total 1800 ml


 


Balance -400 ml

















EDWARD MEDRANO MD    Apr 2, 2021 08:45

## 2021-04-03 VITALS — DIASTOLIC BLOOD PRESSURE: 77 MMHG | SYSTOLIC BLOOD PRESSURE: 157 MMHG

## 2021-04-03 VITALS — SYSTOLIC BLOOD PRESSURE: 130 MMHG | DIASTOLIC BLOOD PRESSURE: 73 MMHG

## 2021-04-03 VITALS — DIASTOLIC BLOOD PRESSURE: 58 MMHG | SYSTOLIC BLOOD PRESSURE: 133 MMHG

## 2021-04-03 LAB
BASOPHILS # BLD AUTO: 0.1 10^3/UL (ref 0–0.2)
BASOPHILS NFR BLD AUTO: 0.6 % (ref 0–1)
BUN SERPL-MCNC: 16 MG/DL (ref 7–18)
CALCIUM SERPL-MCNC: 8.1 MG/DL (ref 8.8–10.2)
CHLORIDE SERPL-SCNC: 106 MEQ/L (ref 98–107)
CO2 SERPL-SCNC: 28 MEQ/L (ref 21–32)
CREAT SERPL-MCNC: 0.43 MG/DL (ref 0.55–1.3)
EOSINOPHIL # BLD AUTO: 0.1 10^3/UL (ref 0–0.5)
EOSINOPHIL NFR BLD AUTO: 1.1 % (ref 0–3)
GFR SERPL CREATININE-BSD FRML MDRD: > 60 ML/MIN/{1.73_M2} (ref 32–?)
GLUCOSE SERPL-MCNC: 119 MG/DL (ref 70–100)
HCT VFR BLD AUTO: 27 % (ref 36–47)
HGB BLD-MCNC: 8.8 G/DL (ref 12–15.5)
LYMPHOCYTES # BLD AUTO: 0.8 10^3/UL (ref 1.5–5)
LYMPHOCYTES NFR BLD AUTO: 10.2 % (ref 24–44)
MCH RBC QN AUTO: 31.3 PG (ref 27–33)
MCHC RBC AUTO-ENTMCNC: 32.6 G/DL (ref 32–36.5)
MCV RBC AUTO: 96.1 FL (ref 80–96)
MONOCYTES # BLD AUTO: 0.6 10^3/UL (ref 0–0.8)
MONOCYTES NFR BLD AUTO: 7.9 % (ref 2–8)
NEUTROPHILS # BLD AUTO: 6.4 10^3/UL (ref 1.5–8.5)
NEUTROPHILS NFR BLD AUTO: 79.7 % (ref 36–66)
PLATELET # BLD AUTO: 474 10^3/UL (ref 150–450)
POTASSIUM SERPL-SCNC: 4.3 MEQ/L (ref 3.5–5.1)
RBC # BLD AUTO: 2.81 10^6/UL (ref 4–5.4)
SODIUM SERPL-SCNC: 139 MEQ/L (ref 136–145)
WBC # BLD AUTO: 8 10^3/UL (ref 4–10)

## 2021-04-03 RX ADMIN — MONTELUKAST SODIUM SCH MG: 10 TABLET, FILM COATED ORAL at 17:44

## 2021-04-03 RX ADMIN — ACETAMINOPHEN SCH MG: 500 TABLET ORAL at 20:51

## 2021-04-03 RX ADMIN — DOCUSATE SODIUM SCH MG: 100 CAPSULE, LIQUID FILLED ORAL at 08:38

## 2021-04-03 RX ADMIN — MULTIPLE VITAMINS W/ MINERALS TAB SCH TAB: TAB at 12:29

## 2021-04-03 RX ADMIN — ASPIRIN SCH MG: 81 TABLET ORAL at 08:40

## 2021-04-03 RX ADMIN — INSULIN LISPRO SCH UNITS: 100 INJECTION, SOLUTION INTRAVENOUS; SUBCUTANEOUS at 12:29

## 2021-04-03 RX ADMIN — DOCUSATE SODIUM SCH MG: 100 CAPSULE, LIQUID FILLED ORAL at 20:49

## 2021-04-03 RX ADMIN — INSULIN LISPRO SCH UNITS: 100 INJECTION, SOLUTION INTRAVENOUS; SUBCUTANEOUS at 08:37

## 2021-04-03 RX ADMIN — WHITE PETROLATUM SCH DOSE: 57; 17 PASTE TOPICAL at 20:52

## 2021-04-03 RX ADMIN — ROSUVASTATIN CALCIUM SCH MG: 10 TABLET, FILM COATED ORAL at 17:44

## 2021-04-03 RX ADMIN — INSULIN LISPRO SCH UNITS: 100 INJECTION, SOLUTION INTRAVENOUS; SUBCUTANEOUS at 20:51

## 2021-04-03 RX ADMIN — HYDRALAZINE HYDROCHLORIDE SCH MG: 25 TABLET, FILM COATED ORAL at 00:46

## 2021-04-03 RX ADMIN — SENNOSIDES SCH TAB: 8.6 TABLET, FILM COATED ORAL at 20:49

## 2021-04-03 RX ADMIN — ACETAMINOPHEN SCH MG: 500 TABLET ORAL at 17:44

## 2021-04-03 RX ADMIN — ACETAMINOPHEN SCH MG: 500 TABLET ORAL at 13:18

## 2021-04-03 RX ADMIN — GABAPENTIN SCH MG: 100 CAPSULE ORAL at 20:49

## 2021-04-03 RX ADMIN — WHITE PETROLATUM SCH DOSE: 57; 17 PASTE TOPICAL at 08:41

## 2021-04-03 RX ADMIN — LEVOTHYROXINE SODIUM SCH MCG: 25 TABLET ORAL at 05:46

## 2021-04-03 RX ADMIN — LABETALOL HYDROCHLORIDE SCH MG: 100 TABLET, FILM COATED ORAL at 08:39

## 2021-04-03 RX ADMIN — WHITE PETROLATUM SCH DOSE: 57; 17 PASTE TOPICAL at 18:00

## 2021-04-03 RX ADMIN — HYDRALAZINE HYDROCHLORIDE SCH MG: 25 TABLET, FILM COATED ORAL at 05:47

## 2021-04-03 RX ADMIN — Medication SCH MG: at 12:29

## 2021-04-03 RX ADMIN — PANTOPRAZOLE SODIUM SCH MG: 40 TABLET, DELAYED RELEASE ORAL at 08:40

## 2021-04-03 RX ADMIN — ACETAMINOPHEN SCH MG: 500 TABLET ORAL at 08:38

## 2021-04-03 RX ADMIN — ESCITALOPRAM OXALATE SCH MG: 10 TABLET, FILM COATED ORAL at 17:44

## 2021-04-03 RX ADMIN — MAGNESIUM OXIDE SCH MG: 400 TABLET ORAL at 12:29

## 2021-04-03 RX ADMIN — INSULIN LISPRO SCH UNITS: 100 INJECTION, SOLUTION INTRAVENOUS; SUBCUTANEOUS at 17:43

## 2021-04-03 RX ADMIN — FERROUS SULFATE TAB 325 MG (65 MG ELEMENTAL FE) SCH MG: 325 (65 FE) TAB at 08:39

## 2021-04-03 RX ADMIN — GABAPENTIN SCH MG: 100 CAPSULE ORAL at 08:39

## 2021-04-03 RX ADMIN — LABETALOL HYDROCHLORIDE SCH MG: 100 TABLET, FILM COATED ORAL at 20:49

## 2021-04-03 NOTE — HPEPDOC
Physiatrist Note


DATE OF ADMISSION: 4-2-21





DATE OF SERVICE: 4-3-21





TIME OF ADMISSION: Please refer to physician's admission order.





SOURCE OF ADMISSION INFORMATION: [facility] record and patient





CHIEF COMPLAINT: right hip fracture





HISTORY OF PRESENT ILLNESS: 





85F pmh of major depressive disorder with hx of electroshock therapy, HTN, CAD 

s/p CABG, HLD, CAD, DM, Hypothyroidism, gout, iron deficiency anemia who fell at

home and presented to [facility] ED on 3-26-21 with difficulty walking. Imaging 

revealed, "Acute impacted cervical neck fracture right hip." She was evaluated 

by orthopedics and underwent a right sided hip hemiarthroplasty on 3-28-21 

complicated by post-op anemia, worsening depression, and elevated BPs in setting

of difficult to control pain. She was evaluated by therapy deemed unsafe to go 

home with mobility and ADL impairments and deemed medically appropriate for 

discharge to ARU on 4-3-21.











REVIEW OF SYSTEMS: The following is a completed review of systems and has been 

reviewed. Review of systems otherwise unremarkable.


PAIN: Patient self reports +right hip pain


EYES: No recent vision changes.


EARS, NOSE, & THROAT: No throat pain, or dysphagia, or rhinorrhea


CARDIOVASCULAR: Denies chest pain or palpitations


PULMONARY: Denies shortness of breath


GASTROINTESTINAL: Denies constipation/diarrhea


GENITOURINARY: +bell


MUSCULOSKELETAL:+right hip fracture


NEUROLOGICAL:+tardive dyskinesia


HEMATOLOGICAL:denies easy bruising


SKIN: right hip incision


PSYCHIATRIC: +depressed


All other review of systems found to be negative.





PAST MEDICAL HISTORY:


as per HPI





PAST SURGICAL HISTORY:





hysterectomy tonsillectomy, cholecystectomy, appendectomy, left hip ORIF, 

vertebroplasty, CABG





ALLERGIES: Please see below.





MEDICATIONS: Please see below.





FAMILY HISTORY:  cardiac, stroke





SOCIAL HISTORY: no etoh/illicit drugs/smoking





DIET: consistent carb





PHYSICAL EXAMINATION:


VITAL SIGNS: Please see below.


GENERAL: Pleasant and cooperative. No acute distress. 


HEENT: PERRL. Extraocular movements intact. Clear conjunctiva


CARDIOVASCULAR: Regular rate and rhythm. No murmurs, rubs, or gallops


LUNGS: Clear to auscultation bilaterally. No wheezes. No rhonchi


ABDOMEN: Soft, nontender, nondistended. Positive bowel sounds. Normal active 

bowel sounds


NEUROLOGICAL: Alert and oriented times three. Cranial nerves II through XII 

grossly intact. Sensation grossly intact


+tardive dyskinesia


EXTREMITIES: 5\5 strength bilateral upper extremities. 4\5 strength right ankle 

DF/PF (limited due to surgery) 5/5 strength in left lower extremity. 





SKIN: sacral erythema (blanchable)





LABORATORY DATA: Please see below.





IMAGING: Imaging documentation personally reviewed by record





FUNCTIONAL STATUS: 


Premorbid:  Modified Independent with all activities of daily life as well as 

mobility


On Admission: Min assist bed mobility, functional transfers, dressing, 

toileting, ambulating





GOALS: Mod-I bed mobility, functional transfers, dressing, toileting, 

ambulating, bathing





ASSESSMENT:85-year-old F with past medical history of CAD, depression who 

presents status post right hip fracture s/p hemiarthroplasty





PLAN:


1. Rehab- PT/OT advance mobility and ADLs, hip precautions, 

strengthen/stretch/maintain ROM all 4 limbs


2. Neuro- monitor for delirium, pt with hx of depression and recent refusal to 

take meds


3. CArdio- hx of CAD s/p CABG c/u ASA and labetolol


-HTN- c/u amlodipine 


-HLD- c/u statin


-medicine consulted to assist in overall management


4. Resp- monitor for infection, encourage incentive spirometry


- Singulair 


5. GI ppx- protonix


6. DT ppx on ASA 81 daily, will order Dopplers to r/o dVT, teds


7. Pain- tylenol and gabapentin


8. PSych- hx of depression, cu lexapro


9. Endo- hypothryoidism, c/u synthroid


10. Dispo- TBD





POST ADMISSION PHYSICIAN EVALUATION: 


Medical and functional status: Description of medical status, medical 

assessment: As above. Rehabilitation diagnosis and current and prior cold morbid

medical conditions as above. Risk of complications and plans to mitigate them as

above. Description of functional status current status is as above. Prior status

as above.


Status compared to preadmission: There are no clinically significant differences

between the patient's current status and the information described on the 

preadmission screening document.


Treatment plan anticipated: Treatment plan is as described above. Required 

disciplines including physical therapy, occupational therapy, others as noted 

above.


Intensity of services: 3 hours a day, 6 days a week. 


Special considerations: There are no specific special or safety considerations 

that would likely preclude immediate implementation of an intensive 

rehabilitation program or subsequently influence the plan of care.





ATTESTATION: Considering all the information above, it is my best judgment that 

this patient requires intensive rehabilitation therapy as described above and an

inpatient hospital environment due to the complexity of nursing, medical, and 

rehabilitation needs required by the patient. Furthermore, this patient can 

reasonably be expected to participate in an benefit from an inpatient 

rehabilitation stay with an interdisciplinary team approach to the delivery of 

rehabilitation care under the direction and supervision of rehabilitation 

physician.





PROGNOSIS: good





ESTIMATED LENGTH OF STAY:12-14 days.





PROJECTED DISCHARGE DESTINATION: Home with family support and any durable 

medical equipment required to increase functional safety and mobility.





TIME SPENT COUNSELING AND COORDINATING INITIAL CARE: Greater than 70 minutes.


Vital Signs





Vital Sign - Last 24 Hours








 4/2/21 4/2/21 4/2/21 4/2/21





 15:45 20:00 20:10 21:19


 


Temp 98.8  97.9 


 


Pulse 76  80 80


 


Resp 18  18 


 


B/P (MAP) 156/64 (94) 156/68 156/68 (97) 156/68


 


Pulse Ox 97  95 


 


O2 Delivery Room Air  Room Air 


 


    





 4/3/21 4/3/21 4/3/21 4/3/21





 00:46 05:47 06:13 08:39


 


Temp   97.3 


 


Pulse   71 70


 


Resp   18 


 


B/P (MAP) 152/62 156/72 157/77 (103) 140/70


 


Pulse Ox   95 


 


O2 Delivery   Room Air 











Laboratory Data


Labs 24H


Laboratory Tests 2


4/2/21 20:16: Bedside Glucose (Misc Panel) 131H


4/3/21 06:05: Bedside Glucose (Misc Panel) 140H


FSBS





Laboratory Tests








Test


 4/2/21


20:16 4/3/21


06:05 Range/Units


 


 


Bedside Glucose (Misc Panel) 131 140   MG/DL











Home Medications


Scheduled


Acetaminophen (Acetaminophen) 500 Mg Tablet, 1,000 MG PO Q6H


Allopurinol (Zyloprim) 300 Mg Tab, 300 MG PO DAILY, (Reported)


Ascorbic Acid (Vitamin C) 500 Mg Capsule, 500 MG PO DAILY, (Reported)


Aspirin (Children's Aspirin) 81 Mg Tab.chew, 81 MG PO DAILY


Calcium Carbonate/Vitamin D3 (Calcium 500-Vit D3 200 Caplet) 1 Tab Tab, 1 TAB PO

DAILY, (Reported)


   TAKES AT LUNCHTIME 


Docusate Sodium (Stool Softener) 100 Mg Capsule, 100 MG PO BID, (Reported)


Escitalopram Oxalate (Lexapro) 20 Mg Tablet, 20 MG PO QPM, (Reported)


Ferrous Sulfate (Ferrous Sulfate) 325 Mg Tab, 325 MG PO DAILY, (Reported)


Labetalol HCl (Labetalol HCl) 200 Mg Tab, 200 MG PO BID, (Reported)


Levothyroxine Sodium (Levothyroxine Sodium) 25 Mcg Tab, 25 MCG PO DAILY, 

(Reported)


Lisinopril (Lisinopril) 5 Mg Tab, 5 MG PO QPM, (Reported)


Magnesium Oxide (Magnesium Oxide) 400 Mg Tablet, 400 MG PO DAILY, (Reported)


   NOON 


Metformin HCl (Metformin ER Osmotic) 500 Mg Tab, 500 MG PO BID, (Reported)


Montelukast Sodium (Singulair) 10 Mg Tab, 10 MG PO QPM, (Reported)


Multivitamins (Thera M Plus Tablet) 1 Tab Tab, 1 TAB PO DAILY, (Reported)


   TAKES AT LUNCHTIME 


Pantoprazole Sodium (Pantoprazole Sodium) 40 Mg Tab, 40 MG PO DAILY, (Reported)


Rosuvastatin Calcium (Crestor) 5 Mg Tab, 5 MG PO QPM, (Reported)


Trazodone HCl (Trazodone HCl) 50 Mg Tab, 50 MG PO QPM, (Reported)





Allergies


Coded Allergies:  


     tramadol (Unverified  Allergy, Unknown, 3/26/21)





A-FIB/CHADSVASC


A-FIB History


Current/History of A-Fib/PAF?:  No


Current PO Anticoag Therapy:  TAMAR Moreau MD          Apr 3, 2021 10:54

## 2021-04-03 NOTE — IPNPDOC
Text Note


Date of Service


The patient was seen on 4/3/21.





NOTE


SUBJECTIVE:


-Pleasant this morning, eating her breakfast.








OBJECTIVE:


General: Cooperative, No Acute Distress


Eye: PERRLA, Conjunctiva & lids normal, EOMI, anicteric sclerae


Neck: Supple, no JVD, no thyromegaly


Chest: Clear to auscultation, Normal air movement, no crackles or wheezing, 

breathing comfortably on room air


Heart: Rate Normal, Regular Rhythm, Normal S1, Normal S2, no m/r/g


Abdomen: Normal bowel sounds, soft, NTND


Extremities: Tender right hip with movement, dressing in place, otherwise 

without peripheral edema and warm well perfused extremities


Psych: labile mood, wants to go home





Labs: Reviewed





Assessment


85 yr old W with a hx of CAD s/p CABG in 2004, DM2, hypothyroidism, HTN, gout, 

HLD, SUNG, left proximal femur Fracture in 2016, Vertebroplasty and multiple 

other surgeries had a mechanical fall with R hip fracture now s/p R hip 

hemiarthroplasty on 3/28. 





Mechanical fall resulting in right femur fx


-s/p R hip hemiarthroplasty on 3/28, POD # 5


-pain control with scheduled acetaminophen 1gQ6H and 


-DVT ppx with ASA 81 per ortho recs





Hypertension


-labetelol at 100mg BID, amlodipine 10mg QD. 


-DC q6H hydralazine





Hx of MDD 


-continue Escitalopram, 


-consulted psych for depression, flat affect, refusal of care, basically 

encouraged us to encourage her and if persistent to trial low dose stimulant per

psych note.





Gout


-continue Allopurinol.





Dyslipidemia / Coronary artery disease /CABG


-continue Rosuvastatin.





Diabetes mellitus type 2


-Hypoglycemia protocol 


-Sliding scale insulin AC/HS


-FSBG AC/HS


-continue home metformin





Hypothyroidism


-continue Levothyroxine.





DVT prophylaxis: daily ASA 81 per orthopedics rec.


Dispo: per PM&R





VS,Fishbone, I+O


VS, Fishbone, I+O





Vital Signs








  Date Time  Temp Pulse Resp B/P (MAP) Pulse Ox O2 Delivery O2 Flow Rate FiO2


 


4/3/21 06:13 97.3 71 18 157/77 (103) 95 Room Air  














I&O- Last 24 Hours up to 6 AM 


 


 4/3/21





 05:59


 


Intake Total 220 ml


 


Output Total 700 ml


 


Balance -480 ml

















EDWARD MEDRANO MD    Apr 3, 2021 08:43

## 2021-04-03 NOTE — REP
INDICATION:

immobility



COMPARISON:

None.



TECHNIQUE:

Deep vein duplex ultrasound of the lower extremities bilaterally.



FINDINGS:

The deep veins demonstrate normal compression, normal Doppler color flow and normal

Doppler waveforms with respiration augmentation from the popliteal veins to the common

femoral veins bilaterally.



IMPRESSION:

There is no deep vein thrombus in the right or left lower extremities.





<Electronically signed by Grant Padgett > 04/03/21 6162

## 2021-04-04 VITALS — SYSTOLIC BLOOD PRESSURE: 154 MMHG | DIASTOLIC BLOOD PRESSURE: 70 MMHG

## 2021-04-04 VITALS — SYSTOLIC BLOOD PRESSURE: 138 MMHG | DIASTOLIC BLOOD PRESSURE: 63 MMHG

## 2021-04-04 VITALS — SYSTOLIC BLOOD PRESSURE: 162 MMHG | DIASTOLIC BLOOD PRESSURE: 76 MMHG

## 2021-04-04 RX ADMIN — INSULIN LISPRO SCH UNITS: 100 INJECTION, SOLUTION INTRAVENOUS; SUBCUTANEOUS at 20:56

## 2021-04-04 RX ADMIN — ESCITALOPRAM OXALATE SCH MG: 10 TABLET, FILM COATED ORAL at 17:21

## 2021-04-04 RX ADMIN — ROSUVASTATIN CALCIUM SCH MG: 10 TABLET, FILM COATED ORAL at 17:20

## 2021-04-04 RX ADMIN — ACETAMINOPHEN SCH MG: 500 TABLET ORAL at 17:19

## 2021-04-04 RX ADMIN — Medication SCH MG: at 12:03

## 2021-04-04 RX ADMIN — ACETAMINOPHEN SCH MG: 500 TABLET ORAL at 07:34

## 2021-04-04 RX ADMIN — MONTELUKAST SODIUM SCH MG: 10 TABLET, FILM COATED ORAL at 17:21

## 2021-04-04 RX ADMIN — PANTOPRAZOLE SODIUM SCH MG: 40 TABLET, DELAYED RELEASE ORAL at 07:34

## 2021-04-04 RX ADMIN — WHITE PETROLATUM SCH DOSE: 57; 17 PASTE TOPICAL at 07:41

## 2021-04-04 RX ADMIN — ASPIRIN SCH MG: 81 TABLET ORAL at 07:34

## 2021-04-04 RX ADMIN — GABAPENTIN SCH MG: 100 CAPSULE ORAL at 20:56

## 2021-04-04 RX ADMIN — DOCUSATE SODIUM SCH MG: 100 CAPSULE, LIQUID FILLED ORAL at 07:34

## 2021-04-04 RX ADMIN — ACETAMINOPHEN SCH MG: 500 TABLET ORAL at 12:05

## 2021-04-04 RX ADMIN — WHITE PETROLATUM SCH DOSE: 57; 17 PASTE TOPICAL at 20:56

## 2021-04-04 RX ADMIN — MAGNESIUM OXIDE SCH MG: 400 TABLET ORAL at 12:03

## 2021-04-04 RX ADMIN — LABETALOL HYDROCHLORIDE SCH MG: 100 TABLET, FILM COATED ORAL at 20:52

## 2021-04-04 RX ADMIN — WHITE PETROLATUM SCH DOSE: 57; 17 PASTE TOPICAL at 15:38

## 2021-04-04 RX ADMIN — LEVOTHYROXINE SODIUM SCH MCG: 25 TABLET ORAL at 05:32

## 2021-04-04 RX ADMIN — MULTIPLE VITAMINS W/ MINERALS TAB SCH TAB: TAB at 12:03

## 2021-04-04 RX ADMIN — SENNOSIDES SCH TAB: 8.6 TABLET, FILM COATED ORAL at 20:55

## 2021-04-04 RX ADMIN — FERROUS SULFATE TAB 325 MG (65 MG ELEMENTAL FE) SCH MG: 325 (65 FE) TAB at 07:34

## 2021-04-04 RX ADMIN — INSULIN LISPRO SCH UNITS: 100 INJECTION, SOLUTION INTRAVENOUS; SUBCUTANEOUS at 12:03

## 2021-04-04 RX ADMIN — DOCUSATE SODIUM SCH MG: 100 CAPSULE, LIQUID FILLED ORAL at 20:52

## 2021-04-04 RX ADMIN — INSULIN LISPRO SCH UNITS: 100 INJECTION, SOLUTION INTRAVENOUS; SUBCUTANEOUS at 07:32

## 2021-04-04 RX ADMIN — ACETAMINOPHEN SCH MG: 500 TABLET ORAL at 20:55

## 2021-04-04 RX ADMIN — GABAPENTIN SCH MG: 100 CAPSULE ORAL at 07:35

## 2021-04-04 RX ADMIN — INSULIN LISPRO SCH UNITS: 100 INJECTION, SOLUTION INTRAVENOUS; SUBCUTANEOUS at 17:19

## 2021-04-04 RX ADMIN — LABETALOL HYDROCHLORIDE SCH MG: 100 TABLET, FILM COATED ORAL at 07:32

## 2021-04-05 VITALS — DIASTOLIC BLOOD PRESSURE: 69 MMHG | SYSTOLIC BLOOD PRESSURE: 152 MMHG

## 2021-04-05 VITALS — DIASTOLIC BLOOD PRESSURE: 72 MMHG | SYSTOLIC BLOOD PRESSURE: 178 MMHG

## 2021-04-05 VITALS — DIASTOLIC BLOOD PRESSURE: 78 MMHG | SYSTOLIC BLOOD PRESSURE: 162 MMHG

## 2021-04-05 VITALS — DIASTOLIC BLOOD PRESSURE: 55 MMHG | SYSTOLIC BLOOD PRESSURE: 137 MMHG

## 2021-04-05 LAB
BASOPHILS # BLD AUTO: 0.1 10^3/UL (ref 0–0.2)
BASOPHILS NFR BLD AUTO: 1 % (ref 0–1)
BUN SERPL-MCNC: 15 MG/DL (ref 7–18)
CALCIUM SERPL-MCNC: 8.8 MG/DL (ref 8.8–10.2)
CHLORIDE SERPL-SCNC: 103 MEQ/L (ref 98–107)
CO2 SERPL-SCNC: 29 MEQ/L (ref 21–32)
CREAT SERPL-MCNC: 0.44 MG/DL (ref 0.55–1.3)
EOSINOPHIL # BLD AUTO: 0.1 10^3/UL (ref 0–0.5)
EOSINOPHIL NFR BLD AUTO: 1.2 % (ref 0–3)
GFR SERPL CREATININE-BSD FRML MDRD: > 60 ML/MIN/{1.73_M2} (ref 32–?)
GLUCOSE SERPL-MCNC: 118 MG/DL (ref 70–100)
HCT VFR BLD AUTO: 28.4 % (ref 36–47)
HGB BLD-MCNC: 9.3 G/DL (ref 12–15.5)
LYMPHOCYTES # BLD AUTO: 0.8 10^3/UL (ref 1.5–5)
LYMPHOCYTES NFR BLD AUTO: 11.9 % (ref 24–44)
MCH RBC QN AUTO: 32.3 PG (ref 27–33)
MCHC RBC AUTO-ENTMCNC: 32.7 G/DL (ref 32–36.5)
MCV RBC AUTO: 98.6 FL (ref 80–96)
MONOCYTES # BLD AUTO: 0.4 10^3/UL (ref 0–0.8)
MONOCYTES NFR BLD AUTO: 6.1 % (ref 2–8)
NEUTROPHILS # BLD AUTO: 5.4 10^3/UL (ref 1.5–8.5)
NEUTROPHILS NFR BLD AUTO: 79.2 % (ref 36–66)
PLATELET # BLD AUTO: 498 10^3/UL (ref 150–450)
POTASSIUM SERPL-SCNC: 4.8 MEQ/L (ref 3.5–5.1)
RBC # BLD AUTO: 2.88 10^6/UL (ref 4–5.4)
SODIUM SERPL-SCNC: 138 MEQ/L (ref 136–145)
WBC # BLD AUTO: 6.9 10^3/UL (ref 4–10)

## 2021-04-05 RX ADMIN — ESCITALOPRAM OXALATE SCH MG: 10 TABLET, FILM COATED ORAL at 17:16

## 2021-04-05 RX ADMIN — MONTELUKAST SODIUM SCH MG: 10 TABLET, FILM COATED ORAL at 17:15

## 2021-04-05 RX ADMIN — ACETAMINOPHEN SCH MG: 500 TABLET ORAL at 17:16

## 2021-04-05 RX ADMIN — ACETAMINOPHEN SCH MG: 500 TABLET ORAL at 12:20

## 2021-04-05 RX ADMIN — ACETAMINOPHEN SCH MG: 500 TABLET ORAL at 21:11

## 2021-04-05 RX ADMIN — DOCUSATE SODIUM SCH MG: 100 CAPSULE, LIQUID FILLED ORAL at 08:17

## 2021-04-05 RX ADMIN — DOCUSATE SODIUM SCH MG: 100 CAPSULE, LIQUID FILLED ORAL at 21:11

## 2021-04-05 RX ADMIN — WHITE PETROLATUM SCH DOSE: 57; 17 PASTE TOPICAL at 17:17

## 2021-04-05 RX ADMIN — INSULIN LISPRO SCH UNITS: 100 INJECTION, SOLUTION INTRAVENOUS; SUBCUTANEOUS at 08:24

## 2021-04-05 RX ADMIN — LEVOTHYROXINE SODIUM SCH MCG: 25 TABLET ORAL at 05:39

## 2021-04-05 RX ADMIN — INSULIN LISPRO SCH UNITS: 100 INJECTION, SOLUTION INTRAVENOUS; SUBCUTANEOUS at 17:15

## 2021-04-05 RX ADMIN — LABETALOL HCL SCH MG: 200 TABLET, FILM COATED ORAL at 21:11

## 2021-04-05 RX ADMIN — LABETALOL HYDROCHLORIDE SCH MG: 100 TABLET, FILM COATED ORAL at 10:47

## 2021-04-05 RX ADMIN — ASPIRIN SCH MG: 81 TABLET ORAL at 08:18

## 2021-04-05 RX ADMIN — ROSUVASTATIN CALCIUM SCH MG: 10 TABLET, FILM COATED ORAL at 17:15

## 2021-04-05 RX ADMIN — SENNOSIDES SCH TAB: 8.6 TABLET, FILM COATED ORAL at 21:11

## 2021-04-05 RX ADMIN — GABAPENTIN SCH MG: 100 CAPSULE ORAL at 21:11

## 2021-04-05 RX ADMIN — WHITE PETROLATUM SCH DOSE: 57; 17 PASTE TOPICAL at 21:10

## 2021-04-05 RX ADMIN — Medication SCH MG: at 12:20

## 2021-04-05 RX ADMIN — GABAPENTIN SCH MG: 100 CAPSULE ORAL at 08:18

## 2021-04-05 RX ADMIN — WHITE PETROLATUM SCH DOSE: 57; 17 PASTE TOPICAL at 08:19

## 2021-04-05 RX ADMIN — INSULIN LISPRO SCH UNITS: 100 INJECTION, SOLUTION INTRAVENOUS; SUBCUTANEOUS at 12:21

## 2021-04-05 RX ADMIN — ACETAMINOPHEN SCH MG: 500 TABLET ORAL at 08:19

## 2021-04-05 RX ADMIN — INSULIN LISPRO SCH UNITS: 100 INJECTION, SOLUTION INTRAVENOUS; SUBCUTANEOUS at 21:00

## 2021-04-05 RX ADMIN — PANTOPRAZOLE SODIUM SCH MG: 40 TABLET, DELAYED RELEASE ORAL at 08:17

## 2021-04-05 RX ADMIN — MULTIPLE VITAMINS W/ MINERALS TAB SCH TAB: TAB at 12:19

## 2021-04-05 RX ADMIN — MAGNESIUM OXIDE SCH MG: 400 TABLET ORAL at 12:20

## 2021-04-05 RX ADMIN — FERROUS SULFATE TAB 325 MG (65 MG ELEMENTAL FE) SCH MG: 325 (65 FE) TAB at 08:17

## 2021-04-06 VITALS — DIASTOLIC BLOOD PRESSURE: 72 MMHG | SYSTOLIC BLOOD PRESSURE: 143 MMHG

## 2021-04-06 VITALS — DIASTOLIC BLOOD PRESSURE: 71 MMHG | SYSTOLIC BLOOD PRESSURE: 151 MMHG

## 2021-04-06 VITALS — SYSTOLIC BLOOD PRESSURE: 128 MMHG | DIASTOLIC BLOOD PRESSURE: 60 MMHG

## 2021-04-06 RX ADMIN — WHITE PETROLATUM SCH DOSE: 57; 17 PASTE TOPICAL at 08:18

## 2021-04-06 RX ADMIN — SENNOSIDES SCH TAB: 8.6 TABLET, FILM COATED ORAL at 20:27

## 2021-04-06 RX ADMIN — ACETAMINOPHEN SCH MG: 500 TABLET ORAL at 08:17

## 2021-04-06 RX ADMIN — ACETAMINOPHEN SCH MG: 500 TABLET ORAL at 12:12

## 2021-04-06 RX ADMIN — DOCUSATE SODIUM SCH MG: 100 CAPSULE, LIQUID FILLED ORAL at 20:27

## 2021-04-06 RX ADMIN — WHITE PETROLATUM SCH DOSE: 57; 17 PASTE TOPICAL at 17:49

## 2021-04-06 RX ADMIN — LABETALOL HCL SCH MG: 200 TABLET, FILM COATED ORAL at 08:17

## 2021-04-06 RX ADMIN — INSULIN LISPRO SCH UNITS: 100 INJECTION, SOLUTION INTRAVENOUS; SUBCUTANEOUS at 12:12

## 2021-04-06 RX ADMIN — LABETALOL HCL SCH MG: 200 TABLET, FILM COATED ORAL at 20:27

## 2021-04-06 RX ADMIN — MAGNESIUM OXIDE SCH MG: 400 TABLET ORAL at 12:11

## 2021-04-06 RX ADMIN — GABAPENTIN SCH MG: 100 CAPSULE ORAL at 20:27

## 2021-04-06 RX ADMIN — GABAPENTIN SCH MG: 100 CAPSULE ORAL at 08:17

## 2021-04-06 RX ADMIN — ASPIRIN SCH MG: 81 TABLET ORAL at 08:17

## 2021-04-06 RX ADMIN — DOCUSATE SODIUM SCH MG: 100 CAPSULE, LIQUID FILLED ORAL at 08:17

## 2021-04-06 RX ADMIN — ACETAMINOPHEN SCH MG: 500 TABLET ORAL at 20:27

## 2021-04-06 RX ADMIN — ROSUVASTATIN CALCIUM SCH MG: 10 TABLET, FILM COATED ORAL at 17:47

## 2021-04-06 RX ADMIN — Medication SCH MG: at 12:12

## 2021-04-06 RX ADMIN — ACETAMINOPHEN SCH MG: 500 TABLET ORAL at 17:48

## 2021-04-06 RX ADMIN — INSULIN LISPRO SCH UNITS: 100 INJECTION, SOLUTION INTRAVENOUS; SUBCUTANEOUS at 20:28

## 2021-04-06 RX ADMIN — PANTOPRAZOLE SODIUM SCH MG: 40 TABLET, DELAYED RELEASE ORAL at 08:16

## 2021-04-06 RX ADMIN — ESCITALOPRAM OXALATE SCH MG: 10 TABLET, FILM COATED ORAL at 17:47

## 2021-04-06 RX ADMIN — WHITE PETROLATUM SCH DOSE: 57; 17 PASTE TOPICAL at 20:27

## 2021-04-06 RX ADMIN — MULTIPLE VITAMINS W/ MINERALS TAB SCH TAB: TAB at 12:12

## 2021-04-06 RX ADMIN — FERROUS SULFATE TAB 325 MG (65 MG ELEMENTAL FE) SCH MG: 325 (65 FE) TAB at 08:16

## 2021-04-06 RX ADMIN — INSULIN LISPRO SCH UNITS: 100 INJECTION, SOLUTION INTRAVENOUS; SUBCUTANEOUS at 17:49

## 2021-04-06 RX ADMIN — INSULIN LISPRO SCH UNITS: 100 INJECTION, SOLUTION INTRAVENOUS; SUBCUTANEOUS at 08:18

## 2021-04-06 RX ADMIN — LEVOTHYROXINE SODIUM SCH MCG: 25 TABLET ORAL at 05:56

## 2021-04-06 RX ADMIN — MONTELUKAST SODIUM SCH MG: 10 TABLET, FILM COATED ORAL at 17:48

## 2021-04-06 NOTE — IPNPDOC
PM&R Progress Note


DATE OF SERVICE:  Apr 6, 2021


Physiatrist Progress Note


Subjective:


Patient seen in her room eating an egg sandwich reporting her appetite is good 

and that she is walking in therapy. She denies having pain. 





REVIEW OF SYSTEMS: The following is a completed review of systems and has been 

reviewed. Review of systems otherwise unremarkable.





PAIN: Patient self reports +right hip pain (improving)


EYES: No recent vision changes.


EARS, NOSE, & THROAT: No throat pain, or dysphagia, or rhinorrhea


CARDIOVASCULAR: Denies chest pain or palpitations


PULMONARY: Denies shortness of breath


GASTROINTESTINAL: Denies constipation/diarrhea


GENITOURINARY: denies dysuria


MUSCULOSKELETAL:+right hip fracture


NEUROLOGICAL:+tardive dyskinesia


HEMATOLOGICAL:denies easy bruising


SKIN: right hip incision


PSYCHIATRIC: +depressed


All other review of systems found to be negative.











PHYSICAL EXAMINATION:


VITAL SIGNS: Please see below.


GENERAL: Pleasant and cooperative. No acute distress. 


HEENT: PERRL. Extraocular movements intact. Clear conjunctiva


CARDIOVASCULAR: Regular rate and rhythm. No murmurs, rubs, or gallops


LUNGS: Clear to auscultation bilaterally. No wheezes. No rhonchi


ABDOMEN: Soft, nontender, nondistended. Positive bowel sounds. Normal active 

bowel sounds


NEUROLOGICAL: Alert and oriented times three. Cranial nerves II through XII 

grossly intact. Sensation grossly intact


+tardive dyskinesia


EXTREMITIES: 5\5 strength bilateral upper extremities. 4\5 strength right ankle 

DF/PF (limited due to surgery) 5/5 strength in left lower extremity. 











ASSESSMENT:85-year-old F with past medical history of CAD, depression who p

resents status post right hip fracture s/p hemiarthroplasty





PLAN:


1. Rehab- PT/OT advance mobility and ADLs, hip precautions, 

strengthen/stretch/maintain ROM all 4 limbs


-SLP eval for cog 


2. Neuro- monitor for delirium, pt with hx of depression and recent refusal to 

take meds


3. CArdio- hx of CAD s/p CABG c/u ASA and labetolol


-HTN- c/u amlodipine 


-HLD- c/u statin


-medicine consulted to assist in overall management


4. Resp- monitor for infection, encourage incentive spirometry


- Singulair 


5. GI ppx- protonix


6. DT ppx on ASA 81 daily and teds


-dopplers negative for DVT


7. Pain- tylenol and gabapentin


8. PSych- hx of depression, c/u lexapro


9. Endo- hypothyroidism, c/u synthroid


10. Dispo- TBD


Allergies


Coded Allergies:  


     tramadol (Unverified  Allergy, Unknown, 3/26/21)





Vital Signs





Vital Signs








  Date Time  Temp Pulse Resp B/P (MAP) Pulse Ox O2 Delivery O2 Flow Rate FiO2


 


4/6/21 08:17  61  151/71    


 


4/6/21 05:31 97.4  18  99 Room Air  











Laboratory Data


CBC/BMP


Laboratory Tests


4/5/21 10:07








Labs 24H


Laboratory Tests 2


4/5/21 10:07: 


Immature Granulocyte % (Auto) 0.6, Neutrophils (%) (Auto) 79.2H, Lymphocytes (%)

(Auto) 11.9L, Monocytes (%) (Auto) 6.1, Eosinophils (%) (Auto) 1.2, Basophils 

(%) (Auto) 1.0, Neutrophils # (Auto) 5.4, Lymphocytes # (Auto) 0.8L, Monocytes #

(Auto) 0.4, Eosinophils # (Auto) 0.1, Basophils # (Auto) 0.1, Nucleated Red 

Blood Cells % (auto) 0.0, Anion Gap 6L, Glomerular Filtration Rate > 60.0, 

Calcium Level 8.8


4/5/21 12:05: Bedside Glucose (Misc Panel) 123H


4/5/21 16:51: Bedside Glucose (Misc Panel) 153H


4/5/21 20:04: Bedside Glucose (Misc Panel) 201H


4/6/21 05:07: Bedside Glucose (Misc Panel) 119H





Current Medications


Current Medications





Current Medications








 Medications


  (Trade)  Dose


 Ordered  Sig/Lucrecia


 Route


 PRN Reason  Start Time


 Stop Time Status Last Admin


Dose Admin


 


 Acetaminophen


  (Tylenol Tab)  1,000 mg  QID


 PO


   3/31/21 17:00


 4/2/21 16:44 DC  





 


 Acetaminophen


  (Tylenol Tab)  1,000 mg  QID


 PO


   4/2/21 17:00


    4/6/21 08:17





 


 Allopurinol


  (Zyloprim)  300 mg  DAILY


 PO


   4/1/21 09:00


 4/2/21 16:44 DC  





 


 Allopurinol


  (Zyloprim)  300 mg  DAILY


 PO


   4/3/21 09:00


    4/6/21 08:17





 


 Amlodipine


 Besylate


  (Norvasc)  10 mg  DAILY


 PO


   4/1/21 09:00


 4/2/21 16:45 DC  





 


 Amlodipine


 Besylate


  (Norvasc)  10 mg  DAILY


 PO


   4/3/21 09:00


 4/5/21 17:23 DC 4/5/21 08:18





 


 Aspirin


  (Ecotrin)  81 mg  DAILY


 PO


   4/1/21 09:00


 4/2/21 16:45 DC  





 


 Aspirin


  (Ecotrin)  81 mg  DAILY


 PO


   4/3/21 09:00


    4/6/21 08:17





 


 Calcium/Vitamin D


  (Oscal D)  500 mg  DAILY@1200


 PO


   4/1/21 12:00


 4/2/21 16:45 DC  





 


 Calcium/Vitamin D


  (Oscal D)  500 mg  DAILY@1200


 PO


   4/3/21 12:00


    4/5/21 12:20





 


 Dextrose


  (Dextrose 50%)  25 ml  ASDIRECTED  PRN


 IV


 SEE LABEL COMMENTS  3/31/21 16:50


 4/2/21 16:51 DC  





 


 Dextrose


  (Dextrose 50%)  25 ml  ASDIRECTED  PRN


 IV


 SEE LABEL COMMENTS  4/2/21 16:55


     





 


 Docusate Sodium


  (Colace)  100 mg  BID


 PO


   3/31/21 21:00


 4/2/21 16:47 DC  





 


 Docusate Sodium


  (Colace)  100 mg  BID


 PO


   4/2/21 21:00


    4/6/21 08:17





 


 Escitalopram


 Oxalate


  (Lexapro)  20 mg  DAILY@1800


 PO


   4/2/21 18:00


    4/5/21 17:16





 


 Ferrous Sulfate


  (Ferrous Sulfate)  325 mg  DAILY


 PO


   4/1/21 09:00


 4/2/21 16:48 DC  





 


 Ferrous Sulfate


  (Ferrous Sulfate)  325 mg  DAILY


 PO


   4/3/21 09:00


    4/6/21 08:16





 


 Gabapentin


  (Neurontin)  100 mg  BID


 PO


   3/31/21 21:00


 4/2/21 16:47 DC  





 


 Gabapentin


  (Neurontin)  100 mg  BID


 PO


   4/2/21 21:00


    4/6/21 08:17





 


 Glucagon


  (Glucagon)  1 mg  ASDIRECTED  PRN


 SC


 SEE LABEL COMMENTS  3/31/21 16:50


 4/2/21 16:51 DC  





 


 Glucagon


  (Glucagon)  1 mg  ASDIRECTED  PRN


 SC


 SEE LABEL COMMENTS  4/2/21 16:55


     





 


 Glucose


  (Glucose)  16 GM  ASDIRECTED  PRN


 PO


 SEE LABEL COMMENTS  3/31/21 16:50


 4/2/21 16:51 DC  





 


 Glucose


  (Glucose)  16 GM  ASDIRECTED  PRN


 PO


 SEE LABEL COMMENTS  4/2/21 16:55


     





 


 Hydralazine HCl


  (Apresoline)  25 mg  Q6H


 PO


   4/2/21 18:00


 4/3/21 08:42 DC 4/3/21 05:47





 


 Insulin Human


 Lispro


  (HumaLOG INSULIN)  SEE


 PROTOCOL


 TABLE  AC


 SC


   3/31/21 17:30


 4/2/21 16:48 DC  





 


 Insulin Human


 Lispro


  (HumaLOG INSULIN)  SEE


 PROTOCOL


 TABLE  AC


 SC


   4/2/21 17:30


    4/6/21 08:18





 


 Insulin Human


 Lispro


  (HumaLOG INSULIN)  SEE


 PROTOCOL


 TABLE  QHS


 SC


   3/31/21 21:00


 4/2/21 16:48 DC  





 


 Insulin Human


 Lispro


  (HumaLOG INSULIN)  SEE


 PROTOCOL


 TABLE  QHS


 SC


   4/2/21 21:00


     





 


 Labetalol HCl


  (Normodyne,


 Trandate)  100 mg  BID


 PO


   3/31/21 21:00


 4/2/21 16:49 DC  





 


 Labetalol HCl


  (Normodyne,


 Trandate)  100 mg  BID


 PO


   4/2/21 21:00


 4/5/21 17:22 DC 4/5/21 10:47





 


 Labetalol HCl


  (Normodyne,


 Trandate)  200 mg  BID


 PO


   4/5/21 21:00


    4/6/21 08:17





 


 Levothyroxine


 Sodium


  (Synthroid)  25 mcg  DAILY@06


 PO


   4/1/21 06:00


 4/2/21 16:48 DC  





 


 Levothyroxine


 Sodium


  (Synthroid)  25 mcg  DAILY@06


 PO


   4/3/21 06:00


    4/6/21 05:56





 


 Magnesium Oxide


  (Mag-Ox)  400 mg  DAILY@1200


 PO


   4/3/21 12:00


    4/5/21 12:20





 


 Montelukast Sodium


  (Singulair)  10 mg  DAILY@1800


 PO


   4/2/21 18:00


    4/5/21 17:15





 


 Multivitamins


  (Theragram-M)  1 tab  DAILY@1200


 PO


   4/2/21 12:00


 4/2/21 16:55 DC  





 


 Multivitamins


  (Theragram-M)  1 tab  DAILY@1200


 PO


   4/3/21 12:00


    4/5/21 12:19





 


 Pantoprazole


 Sodium


  (Protonix)  40 mg  DAILY


 PO


   4/1/21 09:00


 4/2/21 16:46 DC  





 


 Pantoprazole


 Sodium


  (Protonix)  40 mg  DAILY


 PO


   4/3/21 09:00


    4/6/21 08:16





 


 Polyethylene


 Glycol


  (Miralax)  1 pkt  DAILY  PRN


 PO


 CONSTIPATION  3/31/21 16:50


 4/2/21 16:51 DC  





 


 Polyethylene


 Glycol


  (Miralax)  1 pkt  DAILY  PRN


 PO


 CONSTIPATION  4/2/21 16:55


    4/4/21 17:25





 


 Rosuvastatin


 Calcium


  (Crestor)  5 mg  DAILY@1800


 PO


   4/2/21 18:00


    4/5/21 17:15





 


 Senna


  (Senokot)  1 tab  QHS


 PO


   3/31/21 21:00


 4/2/21 16:47 DC  





 


 Senna


  (Senokot)  1 tab  QHS


 PO


   4/2/21 21:00


    4/5/21 21:11





 


 Trazodone HCl


  (Desyrel)  50 mg  QHS@1800


 PO


   4/2/21 18:00


    4/5/21 17:15




















TAMAR COTE MD          Apr 6, 2021 08:27

## 2021-04-07 VITALS — DIASTOLIC BLOOD PRESSURE: 67 MMHG | SYSTOLIC BLOOD PRESSURE: 146 MMHG

## 2021-04-07 VITALS — DIASTOLIC BLOOD PRESSURE: 62 MMHG | SYSTOLIC BLOOD PRESSURE: 129 MMHG

## 2021-04-07 VITALS — SYSTOLIC BLOOD PRESSURE: 138 MMHG | DIASTOLIC BLOOD PRESSURE: 78 MMHG

## 2021-04-07 LAB
BASOPHILS # BLD AUTO: 0.1 10^3/UL (ref 0–0.2)
BASOPHILS NFR BLD AUTO: 1.5 % (ref 0–1)
BUN SERPL-MCNC: 15 MG/DL (ref 7–18)
CALCIUM SERPL-MCNC: 8.9 MG/DL (ref 8.8–10.2)
CHLORIDE SERPL-SCNC: 105 MEQ/L (ref 98–107)
CO2 SERPL-SCNC: 29 MEQ/L (ref 21–32)
CREAT SERPL-MCNC: 0.54 MG/DL (ref 0.55–1.3)
EOSINOPHIL # BLD AUTO: 0.1 10^3/UL (ref 0–0.5)
EOSINOPHIL NFR BLD AUTO: 1.1 % (ref 0–3)
GFR SERPL CREATININE-BSD FRML MDRD: > 60 ML/MIN/{1.73_M2} (ref 32–?)
GLUCOSE SERPL-MCNC: 159 MG/DL (ref 70–100)
HCT VFR BLD AUTO: 28.2 % (ref 36–47)
HGB BLD-MCNC: 8.8 G/DL (ref 12–15.5)
LYMPHOCYTES # BLD AUTO: 1.1 10^3/UL (ref 1.5–5)
LYMPHOCYTES NFR BLD AUTO: 17.2 % (ref 24–44)
MCH RBC QN AUTO: 31.2 PG (ref 27–33)
MCHC RBC AUTO-ENTMCNC: 31.2 G/DL (ref 32–36.5)
MCV RBC AUTO: 100 FL (ref 80–96)
MONOCYTES # BLD AUTO: 0.4 10^3/UL (ref 0–0.8)
MONOCYTES NFR BLD AUTO: 6.3 % (ref 2–8)
NEUTROPHILS # BLD AUTO: 4.8 10^3/UL (ref 1.5–8.5)
NEUTROPHILS NFR BLD AUTO: 73.1 % (ref 36–66)
PLATELET # BLD AUTO: 495 10^3/UL (ref 150–450)
POTASSIUM SERPL-SCNC: 4.4 MEQ/L (ref 3.5–5.1)
RBC # BLD AUTO: 2.82 10^6/UL (ref 4–5.4)
SODIUM SERPL-SCNC: 139 MEQ/L (ref 136–145)
WBC # BLD AUTO: 6.5 10^3/UL (ref 4–10)

## 2021-04-07 RX ADMIN — INSULIN LISPRO SCH UNITS: 100 INJECTION, SOLUTION INTRAVENOUS; SUBCUTANEOUS at 17:18

## 2021-04-07 RX ADMIN — LIDOCAINE SCH PATCH: 50 PATCH CUTANEOUS at 13:55

## 2021-04-07 RX ADMIN — INSULIN LISPRO SCH UNITS: 100 INJECTION, SOLUTION INTRAVENOUS; SUBCUTANEOUS at 11:38

## 2021-04-07 RX ADMIN — ACETAMINOPHEN SCH MG: 500 TABLET ORAL at 08:56

## 2021-04-07 RX ADMIN — SENNOSIDES SCH TAB: 8.6 TABLET, FILM COATED ORAL at 21:26

## 2021-04-07 RX ADMIN — ESCITALOPRAM OXALATE SCH MG: 10 TABLET, FILM COATED ORAL at 17:18

## 2021-04-07 RX ADMIN — INSULIN LISPRO SCH UNITS: 100 INJECTION, SOLUTION INTRAVENOUS; SUBCUTANEOUS at 08:57

## 2021-04-07 RX ADMIN — GABAPENTIN SCH MG: 100 CAPSULE ORAL at 21:25

## 2021-04-07 RX ADMIN — ACETAMINOPHEN SCH MG: 500 TABLET ORAL at 21:25

## 2021-04-07 RX ADMIN — GABAPENTIN SCH MG: 100 CAPSULE ORAL at 08:58

## 2021-04-07 RX ADMIN — Medication SCH: at 21:27

## 2021-04-07 RX ADMIN — ROSUVASTATIN CALCIUM SCH MG: 10 TABLET, FILM COATED ORAL at 17:18

## 2021-04-07 RX ADMIN — WHITE PETROLATUM SCH DOSE: 57; 17 PASTE TOPICAL at 17:19

## 2021-04-07 RX ADMIN — WHITE PETROLATUM SCH DOSE: 57; 17 PASTE TOPICAL at 08:58

## 2021-04-07 RX ADMIN — DOCUSATE SODIUM SCH MG: 100 CAPSULE, LIQUID FILLED ORAL at 08:57

## 2021-04-07 RX ADMIN — Medication SCH MG: at 11:38

## 2021-04-07 RX ADMIN — PANTOPRAZOLE SODIUM SCH MG: 40 TABLET, DELAYED RELEASE ORAL at 08:58

## 2021-04-07 RX ADMIN — LABETALOL HCL SCH MG: 200 TABLET, FILM COATED ORAL at 08:58

## 2021-04-07 RX ADMIN — ASPIRIN SCH MG: 81 TABLET ORAL at 08:57

## 2021-04-07 RX ADMIN — INSULIN LISPRO SCH UNITS: 100 INJECTION, SOLUTION INTRAVENOUS; SUBCUTANEOUS at 21:00

## 2021-04-07 RX ADMIN — MAGNESIUM OXIDE SCH MG: 400 TABLET ORAL at 11:38

## 2021-04-07 RX ADMIN — LEVOTHYROXINE SODIUM SCH MCG: 25 TABLET ORAL at 06:02

## 2021-04-07 RX ADMIN — MONTELUKAST SODIUM SCH MG: 10 TABLET, FILM COATED ORAL at 17:18

## 2021-04-07 RX ADMIN — FERROUS SULFATE TAB 325 MG (65 MG ELEMENTAL FE) SCH MG: 325 (65 FE) TAB at 08:58

## 2021-04-07 RX ADMIN — ACETAMINOPHEN SCH MG: 500 TABLET ORAL at 13:00

## 2021-04-07 RX ADMIN — WHITE PETROLATUM SCH DOSE: 57; 17 PASTE TOPICAL at 21:27

## 2021-04-07 RX ADMIN — LABETALOL HCL SCH MG: 200 TABLET, FILM COATED ORAL at 21:26

## 2021-04-07 RX ADMIN — ACETAMINOPHEN SCH MG: 500 TABLET ORAL at 17:19

## 2021-04-07 RX ADMIN — MULTIPLE VITAMINS W/ MINERALS TAB SCH TAB: TAB at 11:38

## 2021-04-07 RX ADMIN — DOCUSATE SODIUM SCH MG: 100 CAPSULE, LIQUID FILLED ORAL at 21:25

## 2021-04-07 NOTE — IPNPDOC
PM&R Progress Note


DATE OF SERVICE:  Apr 7, 2021


Physiatrist Progress Note


Subjective:


Patient seen in PT working on leg exercises, reporting she has some mild right 

groin pain. 





REVIEW OF SYSTEMS: The following is a completed review of systems and has been 

reviewed. Review of systems otherwise unremarkable.





PAIN: Patient self reports +right hip pain (improving)


EYES: No recent vision changes.


EARS, NOSE, & THROAT: No throat pain, or dysphagia, or rhinorrhea


CARDIOVASCULAR: Denies chest pain or palpitations


PULMONARY: Denies shortness of breath


GASTROINTESTINAL: Denies constipation/diarrhea


GENITOURINARY: denies dysuria


MUSCULOSKELETAL:+right hip fracture


NEUROLOGICAL:+tardive dyskinesia


HEMATOLOGICAL:denies easy bruising


SKIN: right hip incision


PSYCHIATRIC: +depressed


All other review of systems found to be negative.











PHYSICAL EXAMINATION:


VITAL SIGNS: Please see below.


GENERAL: Pleasant and cooperative. No acute distress. 


HEENT: PERRL. Extraocular movements intact. Clear conjunctiva


CARDIOVASCULAR: Regular rate and rhythm. No murmurs, rubs, or gallops


LUNGS: Clear to auscultation bilaterally. No wheezes. No rhonchi


ABDOMEN: Soft, nontender, nondistended. Positive bowel sounds. Normal active 

bowel sounds


+suprapubic TTP


NEUROLOGICAL: Alert and oriented times three. Cranial nerves II through XII 

grossly intact. Sensation grossly intact


+tardive dyskinesia


EXTREMITIES: 5\5 strength bilateral upper extremities. 4\5 strength right ankle 

DF/PF (limited due to surgery) 5/5 strength in left lower extremity. 











ASSESSMENT:85-year-old F with past medical history of CAD, depression who 

presents status post right hip fracture s/p hemiarthroplasty





PLAN:


1. Rehab- PT/OT advance mobility and ADLs, hip precautions, stren

gthen/stretch/maintain ROM all 4 limbs


-SLP eval for cog 


2. Neuro- monitor for delirium, pt with hx of depression and recent refusal to 

take meds


3. CArdio- hx of CAD s/p CABG c/u ASA and labetolol


-HTN- c/u amlodipine 


-HLD- c/u statin


-medicine consulted to assist in overall management


4. Resp- monitor for infection, encourage incentive spirometry


- Singulair 


5. GI ppx- protonix


6. DT ppx on ASA 81 daily and teds


-dopplers negative for DVT


7. Pain- tylenol and gabapentin


8. PSych- hx of depression, c/u lexapro


9. Endo- hypothyroidism, c/u synthroid


10. - patient per daughter is not cognitively at baseline and +suprapubic 

tenderness on exam , will order UA  to r/o infection


11. Dispo- TBD


Allergies


Coded Allergies:  


     tramadol (Unverified  Allergy, Unknown, 3/26/21)





Vital Signs





Vital Signs








  Date Time  Temp Pulse Resp B/P (MAP) Pulse Ox O2 Delivery O2 Flow Rate FiO2


 


4/7/21 08:58  67  161/73    


 


4/7/21 06:00 98.4  16  98 Room Air  











Laboratory Data


CBC/BMP


Laboratory Tests


4/7/21 07:35








Labs 24H


Laboratory Tests 2


4/6/21 16:12: Bedside Glucose (Misc Panel) 147H


4/6/21 19:29: Bedside Glucose (Misc Panel) 190H


4/7/21 06:05: Bedside Glucose (Misc Panel) 133H


4/7/21 07:35: 


Immature Granulocyte % (Auto) 0.8, Neutrophils (%) (Auto) 73.1H, Lymphocytes (%)

(Auto) 17.2L, Monocytes (%) (Auto) 6.3, Eosinophils (%) (Auto) 1.1, Basophils 

(%) (Auto) 1.5H, Neutrophils # (Auto) 4.8, Lymphocytes # (Auto) 1.1L, Monocytes 

# (Auto) 0.4, Eosinophils # (Auto) 0.1, Basophils # (Auto) 0.1, Nucleated Red 

Blood Cells % (auto) 0.0, Anion Gap 5L, Glomerular Filtration Rate > 60.0, 

Calcium Level 8.9


4/7/21 11:28: Bedside Glucose (Misc Panel) 87





Current Medications


Current Medications





Current Medications








 Medications


  (Trade)  Dose


 Ordered  Sig/Lucrecia


 Route


 PRN Reason  Start Time


 Stop Time Status Last Admin


Dose Admin


 


 Acetaminophen


  (Tylenol Tab)  1,000 mg  QID


 PO


   3/31/21 17:00


 4/2/21 16:44 DC  





 


 Acetaminophen


  (Tylenol Tab)  1,000 mg  QID


 PO


   4/2/21 17:00


    4/7/21 08:56





 


 Allopurinol


  (Zyloprim)  300 mg  DAILY


 PO


   4/1/21 09:00


 4/2/21 16:44 DC  





 


 Allopurinol


  (Zyloprim)  300 mg  DAILY


 PO


   4/3/21 09:00


    4/7/21 08:57





 


 Amlodipine


 Besylate


  (Norvasc)  10 mg  DAILY


 PO


   4/1/21 09:00


 4/2/21 16:45 DC  





 


 Amlodipine


 Besylate


  (Norvasc)  10 mg  DAILY


 PO


   4/3/21 09:00


 4/5/21 17:23 DC 4/5/21 08:18





 


 Aspirin


  (Ecotrin)  81 mg  DAILY


 PO


   4/1/21 09:00


 4/2/21 16:45 DC  





 


 Aspirin


  (Ecotrin)  81 mg  DAILY


 PO


   4/3/21 09:00


    4/7/21 08:57





 


 Calcium/Vitamin D


  (Oscal D)  500 mg  DAILY@1200


 PO


   4/1/21 12:00


 4/2/21 16:45 DC  





 


 Calcium/Vitamin D


  (Oscal D)  500 mg  DAILY@1200


 PO


   4/3/21 12:00


    4/6/21 12:12





 


 Dextrose


  (Dextrose 50%)  25 ml  ASDIRECTED  PRN


 IV


 SEE LABEL COMMENTS  3/31/21 16:50


 4/2/21 16:51 DC  





 


 Dextrose


  (Dextrose 50%)  25 ml  ASDIRECTED  PRN


 IV


 SEE LABEL COMMENTS  4/2/21 16:55


     





 


 Docusate Sodium


  (Colace)  100 mg  BID


 PO


   3/31/21 21:00


 4/2/21 16:47 DC  





 


 Docusate Sodium


  (Colace)  100 mg  BID


 PO


   4/2/21 21:00


    4/7/21 08:57





 


 Escitalopram


 Oxalate


  (Lexapro)  20 mg  DAILY@1800


 PO


   4/2/21 18:00


    4/6/21 17:47





 


 Ferrous Sulfate


  (Ferrous Sulfate)  325 mg  DAILY


 PO


   4/1/21 09:00


 4/2/21 16:48 DC  





 


 Ferrous Sulfate


  (Ferrous Sulfate)  325 mg  DAILY


 PO


   4/3/21 09:00


    4/7/21 08:58





 


 Gabapentin


  (Neurontin)  100 mg  BID


 PO


   3/31/21 21:00


 4/2/21 16:47 DC  





 


 Gabapentin


  (Neurontin)  100 mg  BID


 PO


   4/2/21 21:00


    4/7/21 08:58





 


 Glucagon


  (Glucagon)  1 mg  ASDIRECTED  PRN


 SC


 SEE LABEL COMMENTS  3/31/21 16:50


 4/2/21 16:51 DC  





 


 Glucagon


  (Glucagon)  1 mg  ASDIRECTED  PRN


 SC


 SEE LABEL COMMENTS  4/2/21 16:55


     





 


 Glucose


  (Glucose)  16 GM  ASDIRECTED  PRN


 PO


 SEE LABEL COMMENTS  3/31/21 16:50


 4/2/21 16:51 DC  





 


 Glucose


  (Glucose)  16 GM  ASDIRECTED  PRN


 PO


 SEE LABEL COMMENTS  4/2/21 16:55


     





 


 Hydralazine HCl


  (Apresoline)  25 mg  Q6H


 PO


   4/2/21 18:00


 4/3/21 08:42 DC 4/3/21 05:47





 


 Insulin Human


 Lispro


  (HumaLOG INSULIN)  SEE


 PROTOCOL


 TABLE  AC


 SC


   3/31/21 17:30


 4/2/21 16:48 DC  





 


 Insulin Human


 Lispro


  (HumaLOG INSULIN)  SEE


 PROTOCOL


 TABLE  AC


 SC


   4/2/21 17:30


    4/7/21 08:57





 


 Insulin Human


 Lispro


  (HumaLOG INSULIN)  SEE


 PROTOCOL


 TABLE  QHS


 SC


   3/31/21 21:00


 4/2/21 16:48 DC  





 


 Insulin Human


 Lispro


  (HumaLOG INSULIN)  SEE


 PROTOCOL


 TABLE  QHS


 SC


   4/2/21 21:00


     





 


 Labetalol HCl


  (Normodyne,


 Trandate)  100 mg  BID


 PO


   3/31/21 21:00


 4/2/21 16:49 DC  





 


 Labetalol HCl


  (Normodyne,


 Trandate)  100 mg  BID


 PO


   4/2/21 21:00


 4/5/21 17:22 DC 4/5/21 10:47





 


 Labetalol HCl


  (Normodyne,


 Trandate)  200 mg  BID


 PO


   4/5/21 21:00


    4/7/21 08:58





 


 Levothyroxine


 Sodium


  (Synthroid)  25 mcg  DAILY@06


 PO


   4/1/21 06:00


 4/2/21 16:48 DC  





 


 Levothyroxine


 Sodium


  (Synthroid)  25 mcg  DAILY@06


 PO


   4/3/21 06:00


    4/7/21 06:02





 


 Magnesium Oxide


  (Mag-Ox)  400 mg  DAILY@1200


 PO


   4/3/21 12:00


    4/6/21 12:11





 


 Montelukast Sodium


  (Singulair)  10 mg  DAILY@1800


 PO


   4/2/21 18:00


    4/6/21 17:48





 


 Multivitamins


  (Theragram-M)  1 tab  DAILY@1200


 PO


   4/2/21 12:00


 4/2/21 16:55 DC  





 


 Multivitamins


  (Theragram-M)  1 tab  DAILY@1200


 PO


   4/3/21 12:00


    4/6/21 12:12





 


 Pantoprazole


 Sodium


  (Protonix)  40 mg  DAILY


 PO


   4/1/21 09:00


 4/2/21 16:46 DC  





 


 Pantoprazole


 Sodium


  (Protonix)  40 mg  DAILY


 PO


   4/3/21 09:00


    4/7/21 08:58





 


 Polyethylene


 Glycol


  (Miralax)  1 pkt  DAILY  PRN


 PO


 CONSTIPATION  3/31/21 16:50


 4/2/21 16:51 DC  





 


 Polyethylene


 Glycol


  (Miralax)  1 pkt  DAILY  PRN


 PO


 CONSTIPATION  4/2/21 16:55


    4/4/21 17:25





 


 Rosuvastatin


 Calcium


  (Crestor)  5 mg  DAILY@1800


 PO


   4/2/21 18:00


    4/6/21 17:47





 


 Senna


  (Senokot)  1 tab  QHS


 PO


   3/31/21 21:00


 4/2/21 16:47 DC  





 


 Senna


  (Senokot)  1 tab  QHS


 PO


   4/2/21 21:00


    4/6/21 20:27





 


 Trazodone HCl


  (Desyrel)  50 mg  QHS@1800


 PO


   4/2/21 18:00


    4/6/21 17:47




















TAMAR COTE MD          Apr 7, 2021 12:23

## 2021-04-08 VITALS — DIASTOLIC BLOOD PRESSURE: 74 MMHG | SYSTOLIC BLOOD PRESSURE: 160 MMHG

## 2021-04-08 VITALS — SYSTOLIC BLOOD PRESSURE: 158 MMHG | DIASTOLIC BLOOD PRESSURE: 69 MMHG

## 2021-04-08 VITALS — DIASTOLIC BLOOD PRESSURE: 73 MMHG | SYSTOLIC BLOOD PRESSURE: 140 MMHG

## 2021-04-08 RX ADMIN — ACETAMINOPHEN SCH MG: 500 TABLET ORAL at 20:52

## 2021-04-08 RX ADMIN — INSULIN LISPRO SCH UNITS: 100 INJECTION, SOLUTION INTRAVENOUS; SUBCUTANEOUS at 12:22

## 2021-04-08 RX ADMIN — PANTOPRAZOLE SODIUM SCH MG: 40 TABLET, DELAYED RELEASE ORAL at 08:03

## 2021-04-08 RX ADMIN — MONTELUKAST SODIUM SCH MG: 10 TABLET, FILM COATED ORAL at 16:55

## 2021-04-08 RX ADMIN — MAGNESIUM OXIDE SCH MG: 400 TABLET ORAL at 12:21

## 2021-04-08 RX ADMIN — ACETAMINOPHEN SCH MG: 500 TABLET ORAL at 16:57

## 2021-04-08 RX ADMIN — Medication SCH MG: at 12:21

## 2021-04-08 RX ADMIN — WHITE PETROLATUM SCH DOSE: 57; 17 PASTE TOPICAL at 20:54

## 2021-04-08 RX ADMIN — INSULIN LISPRO SCH UNITS: 100 INJECTION, SOLUTION INTRAVENOUS; SUBCUTANEOUS at 16:57

## 2021-04-08 RX ADMIN — WHITE PETROLATUM SCH DOSE: 57; 17 PASTE TOPICAL at 16:57

## 2021-04-08 RX ADMIN — ASPIRIN SCH MG: 81 TABLET ORAL at 08:04

## 2021-04-08 RX ADMIN — Medication SCH: at 20:55

## 2021-04-08 RX ADMIN — GABAPENTIN SCH MG: 100 CAPSULE ORAL at 08:03

## 2021-04-08 RX ADMIN — ROSUVASTATIN CALCIUM SCH MG: 10 TABLET, FILM COATED ORAL at 16:56

## 2021-04-08 RX ADMIN — DOCUSATE SODIUM SCH MG: 100 CAPSULE, LIQUID FILLED ORAL at 08:03

## 2021-04-08 RX ADMIN — ACETAMINOPHEN SCH MG: 500 TABLET ORAL at 08:04

## 2021-04-08 RX ADMIN — DOCUSATE SODIUM SCH MG: 100 CAPSULE, LIQUID FILLED ORAL at 20:54

## 2021-04-08 RX ADMIN — ESCITALOPRAM OXALATE SCH MG: 10 TABLET, FILM COATED ORAL at 16:56

## 2021-04-08 RX ADMIN — WHITE PETROLATUM SCH DOSE: 57; 17 PASTE TOPICAL at 08:04

## 2021-04-08 RX ADMIN — LIDOCAINE SCH PATCH: 50 PATCH CUTANEOUS at 08:04

## 2021-04-08 RX ADMIN — INSULIN LISPRO SCH UNITS: 100 INJECTION, SOLUTION INTRAVENOUS; SUBCUTANEOUS at 20:54

## 2021-04-08 RX ADMIN — INSULIN LISPRO SCH UNITS: 100 INJECTION, SOLUTION INTRAVENOUS; SUBCUTANEOUS at 08:03

## 2021-04-08 RX ADMIN — MULTIPLE VITAMINS W/ MINERALS TAB SCH TAB: TAB at 12:21

## 2021-04-08 RX ADMIN — SENNOSIDES SCH TAB: 8.6 TABLET, FILM COATED ORAL at 20:53

## 2021-04-08 RX ADMIN — GABAPENTIN SCH MG: 100 CAPSULE ORAL at 20:53

## 2021-04-08 RX ADMIN — ACETAMINOPHEN SCH MG: 500 TABLET ORAL at 12:21

## 2021-04-08 RX ADMIN — LABETALOL HCL SCH MG: 200 TABLET, FILM COATED ORAL at 08:03

## 2021-04-08 RX ADMIN — LEVOTHYROXINE SODIUM SCH MCG: 25 TABLET ORAL at 05:53

## 2021-04-08 RX ADMIN — LABETALOL HCL SCH MG: 200 TABLET, FILM COATED ORAL at 20:53

## 2021-04-08 RX ADMIN — FERROUS SULFATE TAB 325 MG (65 MG ELEMENTAL FE) SCH MG: 325 (65 FE) TAB at 08:03

## 2021-04-08 NOTE — IPNPDOC
PM&R Progress Note


DATE OF SERVICE:  Apr 8, 2021


Physiatrist Progress Note


Subjective:


Patient seen in her room stating she feels ok, reports feeling tired.





REVIEW OF SYSTEMS: The following is a completed review of systems and has been 

reviewed. Review of systems otherwise unremarkable.





PAIN: Patient self reports +right hip pain (improving)


EYES: No recent vision changes.


EARS, NOSE, & THROAT: No throat pain, or dysphagia, or rhinorrhea


CARDIOVASCULAR: Denies chest pain or palpitations


PULMONARY: Denies shortness of breath


GASTROINTESTINAL: Denies constipation/diarrhea


GENITOURINARY: denies dysuria


MUSCULOSKELETAL:+right hip fracture


NEUROLOGICAL:+tardive dyskinesia


HEMATOLOGICAL:denies easy bruising


SKIN: right hip incision


PSYCHIATRIC: +depressed


All other review of systems found to be negative.











PHYSICAL EXAMINATION:


VITAL SIGNS: Please see below.


GENERAL: Pleasant and cooperative. No acute distress. 


HEENT: PERRL. Extraocular movements intact. Clear conjunctiva


CARDIOVASCULAR: Regular rate and rhythm. No murmurs, rubs, or gallops


LUNGS: Clear to auscultation bilaterally. No wheezes. No rhonchi


ABDOMEN: Soft, nontender, nondistended. Positive bowel sounds. Normal active 

bowel sounds


+suprapubic TTP


NEUROLOGICAL: Alert and oriented times three. Cranial nerves II through XII 

grossly intact. Sensation grossly intact


+tardive dyskinesia


EXTREMITIES: 5\5 strength bilateral upper extremities. 4\5 strength right ankle 

DF/PF (limited due to surgery) 5/5 strength in left lower extremity. 











ASSESSMENT:85-year-old F with past medical history of CAD, depression who 

presents status post right hip fracture s/p hemiarthroplasty





PLAN:


1. Rehab- PT/OT advance mobility and ADLs, hip precautions, strengthen/stretch/

maintain ROM all 4 limbs- ambulating with RW


-SLP eval for cog 


2. Neuro- monitor for delirium, pt with hx of depression and recent refusal to 

take meds


3. CArdio- hx of CAD s/p CABG c/u ASA and labetolol


-HTN- c/u amlodipine 


-HLD- c/u statin


-medicine consulted to assist in overall management


4. Resp- monitor for infection, encourage incentive spirometry


- Singulair 


5. GI ppx- protonix


6. DT ppx on ASA 81 daily and teds


-dopplers negative for DVT


7. Pain- tylenol and gabapentin, lidoderm apth to right groin


8. PSych- hx of depression, c/u lexapro


9. Endo- hypothyroidism, c/u synthroid


10. - UA negative


11. Dispo- TBD


Allergies


Coded Allergies:  


     tramadol (Unverified  Allergy, Unknown, 3/26/21)





Vital Signs





Vital Signs








  Date Time  Temp Pulse Resp B/P (MAP) Pulse Ox O2 Delivery O2 Flow Rate FiO2


 


4/8/21 08:03  70  154/70    


 


4/8/21 06:09 97.1  18  100 Room Air  











Laboratory Data


Labs 24H


Laboratory Tests 2


4/7/21 11:28: Bedside Glucose (Misc Panel) 87


4/7/21 14:00: 


Urine Color YELLOW, Urine Appearance CLEAR, Urine pH 5.0, Urine Specific Gravity

1.023, Urine Protein NEGATIVE, Urine Glucose (UA) NEGATIVE, Urine Ketones 

TRACEH, Urine Blood NEGATIVE, Urine Nitrite NEGATIVE, Urine Bilirubin NEGATIVE, 

Urine Urobilinogen 0.2, Urine Leukocyte Esterase NEGATIVE, Urine WBC (Auto) 6H, 

Urine RBC (Auto) 0, Urine Hyaline Casts (Auto) 0, Urine Bacteria (Auto) 1+H, 

Urine Squamous Epithelial Cells 1, Urine Mucus (Auto) SMALL, Urine Sperm (Auto) 


4/7/21 16:41: Bedside Glucose (Misc Panel) 129H


4/7/21 21:29: Bedside Glucose (Misc Panel) 188H


4/8/21 05:52: Bedside Glucose (Misc Panel) 124H





Current Medications


Current Medications





Current Medications








 Medications


  (Trade)  Dose


 Ordered  Sig/Lucrecia


 Route


 PRN Reason  Start Time


 Stop Time Status Last Admin


Dose Admin


 


 Acetaminophen


  (Tylenol Tab)  1,000 mg  QID


 PO


   3/31/21 17:00


 4/2/21 16:44 DC  





 


 Acetaminophen


  (Tylenol Tab)  1,000 mg  QID


 PO


   4/2/21 17:00


    4/8/21 08:04





 


 Allopurinol


  (Zyloprim)  300 mg  DAILY


 PO


   4/1/21 09:00


 4/2/21 16:44 DC  





 


 Allopurinol


  (Zyloprim)  300 mg  DAILY


 PO


   4/3/21 09:00


    4/8/21 08:04





 


 Amlodipine


 Besylate


  (Norvasc)  10 mg  DAILY


 PO


   4/1/21 09:00


 4/2/21 16:45 DC  





 


 Amlodipine


 Besylate


  (Norvasc)  10 mg  DAILY


 PO


   4/3/21 09:00


 4/5/21 17:23 DC 4/5/21 08:18





 


 Aspirin


  (Ecotrin)  81 mg  DAILY


 PO


   4/1/21 09:00


 4/2/21 16:45 DC  





 


 Aspirin


  (Ecotrin)  81 mg  DAILY


 PO


   4/3/21 09:00


    4/8/21 08:04





 


 Calcium/Vitamin D


  (Oscal D)  500 mg  DAILY@1200


 PO


   4/1/21 12:00


 4/2/21 16:45 DC  





 


 Calcium/Vitamin D


  (Oscal D)  500 mg  DAILY@1200


 PO


   4/3/21 12:00


    4/6/21 12:12





 


 Dextrose


  (Dextrose 50%)  25 ml  ASDIRECTED  PRN


 IV


 SEE LABEL COMMENTS  3/31/21 16:50


 4/2/21 16:51 DC  





 


 Dextrose


  (Dextrose 50%)  25 ml  ASDIRECTED  PRN


 IV


 SEE LABEL COMMENTS  4/2/21 16:55


     





 


 Docusate Sodium


  (Colace)  100 mg  BID


 PO


   3/31/21 21:00


 4/2/21 16:47 DC  





 


 Docusate Sodium


  (Colace)  100 mg  BID


 PO


   4/2/21 21:00


    4/8/21 08:03





 


 Escitalopram


 Oxalate


  (Lexapro)  20 mg  DAILY@1800


 PO


   4/2/21 18:00


    4/7/21 17:18





 


 Ferrous Sulfate


  (Ferrous Sulfate)  325 mg  DAILY


 PO


   4/1/21 09:00


 4/2/21 16:48 DC  





 


 Ferrous Sulfate


  (Ferrous Sulfate)  325 mg  DAILY


 PO


   4/3/21 09:00


    4/8/21 08:03





 


 Gabapentin


  (Neurontin)  100 mg  BID


 PO


   3/31/21 21:00


 4/2/21 16:47 DC  





 


 Gabapentin


  (Neurontin)  100 mg  BID


 PO


   4/2/21 21:00


    4/8/21 08:03





 


 Glucagon


  (Glucagon)  1 mg  ASDIRECTED  PRN


 SC


 SEE LABEL COMMENTS  3/31/21 16:50


 4/2/21 16:51 DC  





 


 Glucagon


  (Glucagon)  1 mg  ASDIRECTED  PRN


 SC


 SEE LABEL COMMENTS  4/2/21 16:55


     





 


 Glucose


  (Glucose)  16 GM  ASDIRECTED  PRN


 PO


 SEE LABEL COMMENTS  3/31/21 16:50


 4/2/21 16:51 DC  





 


 Glucose


  (Glucose)  16 GM  ASDIRECTED  PRN


 PO


 SEE LABEL COMMENTS  4/2/21 16:55


     





 


 Hydralazine HCl


  (Apresoline)  25 mg  Q6H


 PO


   4/2/21 18:00


 4/3/21 08:42 DC 4/3/21 05:47





 


 Insulin Human


 Lispro


  (HumaLOG INSULIN)  SEE


 PROTOCOL


 TABLE  AC


 SC


   3/31/21 17:30


 4/2/21 16:48 DC  





 


 Insulin Human


 Lispro


  (HumaLOG INSULIN)  SEE


 PROTOCOL


 TABLE  AC


 SC


   4/2/21 17:30


    4/8/21 08:03





 


 Insulin Human


 Lispro


  (HumaLOG INSULIN)  SEE


 PROTOCOL


 TABLE  QHS


 SC


   3/31/21 21:00


 4/2/21 16:48 DC  





 


 Insulin Human


 Lispro


  (HumaLOG INSULIN)  SEE


 PROTOCOL


 TABLE  QHS


 SC


   4/2/21 21:00


     





 


 Labetalol HCl


  (Normodyne,


 Trandate)  100 mg  BID


 PO


   3/31/21 21:00


 4/2/21 16:49 DC  





 


 Labetalol HCl


  (Normodyne,


 Trandate)  100 mg  BID


 PO


   4/2/21 21:00


 4/5/21 17:22 DC 4/5/21 10:47





 


 Labetalol HCl


  (Normodyne,


 Trandate)  200 mg  BID


 PO


   4/5/21 21:00


    4/8/21 08:03





 


 Levothyroxine


 Sodium


  (Synthroid)  25 mcg  DAILY@06


 PO


   4/1/21 06:00


 4/2/21 16:48 DC  





 


 Levothyroxine


 Sodium


  (Synthroid)  25 mcg  DAILY@06


 PO


   4/3/21 06:00


    4/8/21 05:53





 


 Lidocaine


  (Lidoderm Patch)  1 patch  DAILY


 TD


   4/7/21 12:25


    4/8/21 08:04





 


 Magnesium Oxide


  (Mag-Ox)  400 mg  DAILY@1200


 PO


   4/3/21 12:00


    4/6/21 12:11





 


 Montelukast Sodium


  (Singulair)  10 mg  DAILY@1800


 PO


   4/2/21 18:00


    4/7/21 17:18





 


 Multivitamins


  (Theragram-M)  1 tab  DAILY@1200


 PO


   4/2/21 12:00


 4/2/21 16:55 DC  





 


 Multivitamins


  (Theragram-M)  1 tab  DAILY@1200


 PO


   4/3/21 12:00


    4/6/21 12:12





 


 Non-Formulary


 Medication


  (** See Comment


 Field Below **)  REMOVE


 LIDODERM


 PATCH  DAILY@21


 XX


   4/7/21 21:00


    4/7/21 21:27





 


 Pantoprazole


 Sodium


  (Protonix)  40 mg  DAILY


 PO


   4/1/21 09:00


 4/2/21 16:46 DC  





 


 Pantoprazole


 Sodium


  (Protonix)  40 mg  DAILY


 PO


   4/3/21 09:00


    4/8/21 08:03





 


 Polyethylene


 Glycol


  (Miralax)  1 pkt  DAILY  PRN


 PO


 CONSTIPATION  3/31/21 16:50


 4/2/21 16:51 DC  





 


 Polyethylene


 Glycol


  (Miralax)  1 pkt  DAILY  PRN


 PO


 CONSTIPATION  4/2/21 16:55


    4/4/21 17:25





 


 Rosuvastatin


 Calcium


  (Crestor)  5 mg  DAILY@1800


 PO


   4/2/21 18:00


    4/7/21 17:18





 


 Senna


  (Senokot)  1 tab  QHS


 PO


   3/31/21 21:00


 4/2/21 16:47 DC  





 


 Senna


  (Senokot)  1 tab  QHS


 PO


   4/2/21 21:00


    4/7/21 21:26





 


 Trazodone HCl


  (Desyrel)  50 mg  QHS@1800


 PO


   4/2/21 18:00


    4/7/21 17:18




















TAMAR COTE MD          Apr 8, 2021 11:21

## 2021-04-09 VITALS — SYSTOLIC BLOOD PRESSURE: 159 MMHG | DIASTOLIC BLOOD PRESSURE: 74 MMHG

## 2021-04-09 VITALS — DIASTOLIC BLOOD PRESSURE: 66 MMHG | SYSTOLIC BLOOD PRESSURE: 143 MMHG

## 2021-04-09 VITALS — DIASTOLIC BLOOD PRESSURE: 74 MMHG | SYSTOLIC BLOOD PRESSURE: 168 MMHG

## 2021-04-09 LAB
BASOPHILS # BLD AUTO: 0.1 10^3/UL (ref 0–0.2)
BASOPHILS NFR BLD AUTO: 1.8 % (ref 0–1)
BUN SERPL-MCNC: 17 MG/DL (ref 7–18)
CALCIUM SERPL-MCNC: 8.8 MG/DL (ref 8.8–10.2)
CHLORIDE SERPL-SCNC: 105 MEQ/L (ref 98–107)
CO2 SERPL-SCNC: 29 MEQ/L (ref 21–32)
CREAT SERPL-MCNC: 0.47 MG/DL (ref 0.55–1.3)
EOSINOPHIL # BLD AUTO: 0.1 10^3/UL (ref 0–0.5)
EOSINOPHIL NFR BLD AUTO: 1.1 % (ref 0–3)
GFR SERPL CREATININE-BSD FRML MDRD: > 60 ML/MIN/{1.73_M2} (ref 32–?)
GLUCOSE SERPL-MCNC: 138 MG/DL (ref 70–100)
HCT VFR BLD AUTO: 28.1 % (ref 36–47)
HGB BLD-MCNC: 8.8 G/DL (ref 12–15.5)
LYMPHOCYTES # BLD AUTO: 1.3 10^3/UL (ref 1.5–5)
LYMPHOCYTES NFR BLD AUTO: 24.4 % (ref 24–44)
MCH RBC QN AUTO: 31.5 PG (ref 27–33)
MCHC RBC AUTO-ENTMCNC: 31.3 G/DL (ref 32–36.5)
MCV RBC AUTO: 100.7 FL (ref 80–96)
MONOCYTES # BLD AUTO: 0.3 10^3/UL (ref 0–0.8)
MONOCYTES NFR BLD AUTO: 6.2 % (ref 2–8)
NEUTROPHILS # BLD AUTO: 3.6 10^3/UL (ref 1.5–8.5)
NEUTROPHILS NFR BLD AUTO: 65.8 % (ref 36–66)
PLATELET # BLD AUTO: 489 10^3/UL (ref 150–450)
POTASSIUM SERPL-SCNC: 4.5 MEQ/L (ref 3.5–5.1)
RBC # BLD AUTO: 2.79 10^6/UL (ref 4–5.4)
SODIUM SERPL-SCNC: 139 MEQ/L (ref 136–145)
WBC # BLD AUTO: 5.5 10^3/UL (ref 4–10)

## 2021-04-09 RX ADMIN — PANTOPRAZOLE SODIUM SCH MG: 40 TABLET, DELAYED RELEASE ORAL at 09:51

## 2021-04-09 RX ADMIN — LEVOTHYROXINE SODIUM SCH MCG: 25 TABLET ORAL at 06:03

## 2021-04-09 RX ADMIN — MONTELUKAST SODIUM SCH MG: 10 TABLET, FILM COATED ORAL at 21:08

## 2021-04-09 RX ADMIN — GABAPENTIN SCH MG: 100 CAPSULE ORAL at 20:58

## 2021-04-09 RX ADMIN — DOCUSATE SODIUM SCH MG: 100 CAPSULE, LIQUID FILLED ORAL at 21:00

## 2021-04-09 RX ADMIN — ACETAMINOPHEN SCH MG: 500 TABLET ORAL at 17:17

## 2021-04-09 RX ADMIN — GABAPENTIN SCH MG: 100 CAPSULE ORAL at 09:52

## 2021-04-09 RX ADMIN — WHITE PETROLATUM SCH DOSE: 57; 17 PASTE TOPICAL at 21:00

## 2021-04-09 RX ADMIN — ACETAMINOPHEN SCH MG: 500 TABLET ORAL at 12:41

## 2021-04-09 RX ADMIN — LABETALOL HCL SCH MG: 200 TABLET, FILM COATED ORAL at 21:00

## 2021-04-09 RX ADMIN — ACETAMINOPHEN SCH MG: 500 TABLET ORAL at 09:54

## 2021-04-09 RX ADMIN — MAGNESIUM OXIDE SCH MG: 400 TABLET ORAL at 12:40

## 2021-04-09 RX ADMIN — SENNOSIDES SCH TAB: 8.6 TABLET, FILM COATED ORAL at 21:00

## 2021-04-09 RX ADMIN — INSULIN LISPRO SCH UNITS: 100 INJECTION, SOLUTION INTRAVENOUS; SUBCUTANEOUS at 09:56

## 2021-04-09 RX ADMIN — LABETALOL HCL SCH MG: 200 TABLET, FILM COATED ORAL at 09:53

## 2021-04-09 RX ADMIN — Medication SCH: at 21:00

## 2021-04-09 RX ADMIN — WHITE PETROLATUM SCH DOSE: 57; 17 PASTE TOPICAL at 09:58

## 2021-04-09 RX ADMIN — INSULIN LISPRO SCH UNITS: 100 INJECTION, SOLUTION INTRAVENOUS; SUBCUTANEOUS at 21:00

## 2021-04-09 RX ADMIN — DOCUSATE SODIUM SCH MG: 100 CAPSULE, LIQUID FILLED ORAL at 09:55

## 2021-04-09 RX ADMIN — ASPIRIN SCH MG: 81 TABLET ORAL at 09:55

## 2021-04-09 RX ADMIN — LIDOCAINE SCH PATCH: 50 PATCH CUTANEOUS at 09:00

## 2021-04-09 RX ADMIN — ROSUVASTATIN CALCIUM SCH MG: 10 TABLET, FILM COATED ORAL at 17:16

## 2021-04-09 RX ADMIN — Medication SCH MG: at 12:40

## 2021-04-09 RX ADMIN — FERROUS SULFATE TAB 325 MG (65 MG ELEMENTAL FE) SCH MG: 325 (65 FE) TAB at 09:53

## 2021-04-09 RX ADMIN — INSULIN LISPRO SCH UNITS: 100 INJECTION, SOLUTION INTRAVENOUS; SUBCUTANEOUS at 17:18

## 2021-04-09 RX ADMIN — WHITE PETROLATUM SCH DOSE: 57; 17 PASTE TOPICAL at 17:20

## 2021-04-09 RX ADMIN — ESCITALOPRAM OXALATE SCH MG: 10 TABLET, FILM COATED ORAL at 17:15

## 2021-04-09 RX ADMIN — ACETAMINOPHEN SCH MG: 500 TABLET ORAL at 20:59

## 2021-04-09 RX ADMIN — INSULIN LISPRO SCH UNITS: 100 INJECTION, SOLUTION INTRAVENOUS; SUBCUTANEOUS at 12:00

## 2021-04-09 RX ADMIN — MULTIPLE VITAMINS W/ MINERALS TAB SCH TAB: TAB at 12:40

## 2021-04-09 NOTE — IPNPDOC
PM&R Progress Note


DATE OF SERVICE:  Apr 9, 2021


Physiatrist Progress Note


Subjective:


Patient seen in her room reporting she would like to go home with her family and

understands she is not safe to live alone just yet.





REVIEW OF SYSTEMS: The following is a completed review of systems and has been 

reviewed. Review of systems otherwise unremarkable.





PAIN: Patient self reports +right hip pain (improving)


EYES: No recent vision changes.


EARS, NOSE, & THROAT: No throat pain, or dysphagia, or rhinorrhea


CARDIOVASCULAR: Denies chest pain or palpitations


PULMONARY: Denies shortness of breath


GASTROINTESTINAL: Denies constipation/diarrhea


GENITOURINARY: denies dysuria


MUSCULOSKELETAL:+right hip fracture


NEUROLOGICAL:+tardive dyskinesia


HEMATOLOGICAL:denies easy bruising


SKIN: right hip incision


PSYCHIATRIC: +depressed


All other review of systems found to be negative.











PHYSICAL EXAMINATION:


VITAL SIGNS: Please see below.


GENERAL: Pleasant and cooperative. No acute distress. 


HEENT: PERRL. Extraocular movements intact. Clear conjunctiva


CARDIOVASCULAR: Regular rate and rhythm. No murmurs, rubs, or gallops


LUNGS: Clear to auscultation bilaterally. No wheezes. No rhonchi


ABDOMEN: Soft, nontender, nondistended. Positive bowel sounds. Normal active 

bowel sounds


+suprapubic TTP


NEUROLOGICAL: Alert and oriented times three. Cranial nerves II through XII 

grossly intact. Sensation grossly intact


+tardive dyskinesia


EXTREMITIES: 5\5 strength bilateral upper extremities. 4\5 strength right ankle 

DF/PF (limited due to surgery) 5/5 strength in left lower extremity. 











ASSESSMENT:85-year-old F with past medical history of CAD, depression who 

presents status post right hip fracture s/p hemiarthroplasty





PLAN:


1. Rehab- PT/OT advance mobility and ADLs, hip precautions, 

strengthen/stretch/maintain ROM all 4 limbs- ambulating with RW


-SLP eval for cog 


2. Neuro- monitor for delirium, pt with hx of depression and recent refusal to 

take meds- taking meds on ARU and eating well


3. CArdio- hx of CAD s/p CABG c/u ASA and labetolol


-HTN- c/u amlodipine 


-HLD- c/u statin


-medicine consulted to assist in overall management


4. Resp- monitor for infection, encourage incentive spirometry


- Singulair 


5. GI ppx- protonix


6. DT ppx on ASA 81 daily and teds


-dopplers negative for DVT


7. Pain- tylenol and gabapentin, lidoderm patch to right groin


8. PSych- hx of depression, c/u lexapro started on last hospital admission 


9. Endo- hypothyroidism, c/u synthroid


10. - UA negative


11. Dispo- TBD


Allergies


Coded Allergies:  


     tramadol (Unverified  Allergy, Unknown, 3/26/21)





Vital Signs





Vital Signs








  Date Time  Temp Pulse Resp B/P (MAP) Pulse Ox O2 Delivery O2 Flow Rate FiO2


 


4/9/21 14:00 98.3 68 18 143/66 (91) 97 Room Air  











Laboratory Data


CBC/BMP


Laboratory Tests


4/9/21 07:05








Labs 24H


Laboratory Tests 2


4/8/21 16:27: Bedside Glucose (Misc Panel) 151H


4/8/21 20:00: Bedside Glucose (Misc Panel) 121H


4/9/21 06:06: Bedside Glucose (Misc Panel) 131H


4/9/21 07:05: 


Immature Granulocyte % (Auto) 0.7, Neutrophils (%) (Auto) 65.8, Lymphocytes (%) 

(Auto) 24.4, Monocytes (%) (Auto) 6.2, Eosinophils (%) (Auto) 1.1, Basophils (%)

(Auto) 1.8H, Neutrophils # (Auto) 3.6, Lymphocytes # (Auto) 1.3L, Monocytes # 

(Auto) 0.3, Eosinophils # (Auto) 0.1, Basophils # (Auto) 0.1, Nucleated Red 

Blood Cells % (auto) 0.0, Anion Gap 5L, Glomerular Filtration Rate > 60.0, 

Calcium Level 8.8


4/9/21 12:01: Bedside Glucose (Misc Panel) 117H





Current Medications


Current Medications





Current Medications








 Medications


  (Trade)  Dose


 Ordered  Sig/Lucrecia


 Route


 PRN Reason  Start Time


 Stop Time Status Last Admin


Dose Admin


 


 Acetaminophen


  (Tylenol Tab)  1,000 mg  QID


 PO


   3/31/21 17:00


 4/2/21 16:44 DC  





 


 Acetaminophen


  (Tylenol Tab)  1,000 mg  QID


 PO


   4/2/21 17:00


    4/9/21 12:41





 


 Allopurinol


  (Zyloprim)  300 mg  DAILY


 PO


   4/1/21 09:00


 4/2/21 16:44 DC  





 


 Allopurinol


  (Zyloprim)  300 mg  DAILY


 PO


   4/3/21 09:00


    4/9/21 09:52





 


 Amlodipine


 Besylate


  (Norvasc)  10 mg  DAILY


 PO


   4/1/21 09:00


 4/2/21 16:45 DC  





 


 Amlodipine


 Besylate


  (Norvasc)  10 mg  DAILY


 PO


   4/3/21 09:00


 4/5/21 17:23 DC 4/5/21 08:18





 


 Aspirin


  (Ecotrin)  81 mg  DAILY


 PO


   4/1/21 09:00


 4/2/21 16:45 DC  





 


 Aspirin


  (Ecotrin)  81 mg  DAILY


 PO


   4/3/21 09:00


    4/9/21 09:55





 


 Calcium/Vitamin D


  (Oscal D)  500 mg  DAILY@1200


 PO


   4/1/21 12:00


 4/2/21 16:45 DC  





 


 Calcium/Vitamin D


  (Oscal D)  500 mg  DAILY@1200


 PO


   4/3/21 12:00


    4/9/21 12:40





 


 Dextrose


  (Dextrose 50%)  25 ml  ASDIRECTED  PRN


 IV


 SEE LABEL COMMENTS  3/31/21 16:50


 4/2/21 16:51 DC  





 


 Dextrose


  (Dextrose 50%)  25 ml  ASDIRECTED  PRN


 IV


 SEE LABEL COMMENTS  4/2/21 16:55


     





 


 Docusate Sodium


  (Colace)  100 mg  BID


 PO


   3/31/21 21:00


 4/2/21 16:47 DC  





 


 Docusate Sodium


  (Colace)  100 mg  BID


 PO


   4/2/21 21:00


    4/9/21 09:55





 


 Escitalopram


 Oxalate


  (Lexapro)  20 mg  DAILY@1800


 PO


   4/2/21 18:00


    4/8/21 16:56





 


 Ferrous Sulfate


  (Ferrous Sulfate)  325 mg  DAILY


 PO


   4/1/21 09:00


 4/2/21 16:48 DC  





 


 Ferrous Sulfate


  (Ferrous Sulfate)  325 mg  DAILY


 PO


   4/3/21 09:00


    4/9/21 09:53





 


 Gabapentin


  (Neurontin)  100 mg  BID


 PO


   3/31/21 21:00


 4/2/21 16:47 DC  





 


 Gabapentin


  (Neurontin)  100 mg  BID


 PO


   4/2/21 21:00


    4/9/21 09:52





 


 Glucagon


  (Glucagon)  1 mg  ASDIRECTED  PRN


 SC


 SEE LABEL COMMENTS  3/31/21 16:50


 4/2/21 16:51 DC  





 


 Glucagon


  (Glucagon)  1 mg  ASDIRECTED  PRN


 SC


 SEE LABEL COMMENTS  4/2/21 16:55


     





 


 Glucose


  (Glucose)  16 GM  ASDIRECTED  PRN


 PO


 SEE LABEL COMMENTS  3/31/21 16:50


 4/2/21 16:51 DC  





 


 Glucose


  (Glucose)  16 GM  ASDIRECTED  PRN


 PO


 SEE LABEL COMMENTS  4/2/21 16:55


     





 


 Hydralazine HCl


  (Apresoline)  25 mg  Q6H


 PO


   4/2/21 18:00


 4/3/21 08:42 DC 4/3/21 05:47





 


 Insulin Human


 Lispro


  (HumaLOG INSULIN)  SEE


 PROTOCOL


 TABLE  AC


 SC


   3/31/21 17:30


 4/2/21 16:48 DC  





 


 Insulin Human


 Lispro


  (HumaLOG INSULIN)  SEE


 PROTOCOL


 TABLE  AC


 SC


   4/2/21 17:30


    4/9/21 09:56





 


 Insulin Human


 Lispro


  (HumaLOG INSULIN)  SEE


 PROTOCOL


 TABLE  QHS


 SC


   3/31/21 21:00


 4/2/21 16:48 DC  





 


 Insulin Human


 Lispro


  (HumaLOG INSULIN)  SEE


 PROTOCOL


 TABLE  QHS


 SC


   4/2/21 21:00


     





 


 Labetalol HCl


  (Normodyne,


 Trandate)  100 mg  BID


 PO


   3/31/21 21:00


 4/2/21 16:49 DC  





 


 Labetalol HCl


  (Normodyne,


 Trandate)  100 mg  BID


 PO


   4/2/21 21:00


 4/5/21 17:22 DC 4/5/21 10:47





 


 Labetalol HCl


  (Normodyne,


 Trandate)  200 mg  BID


 PO


   4/5/21 21:00


    4/9/21 09:53





 


 Levothyroxine


 Sodium


  (Synthroid)  25 mcg  DAILY@06


 PO


   4/1/21 06:00


 4/2/21 16:48 DC  





 


 Levothyroxine


 Sodium


  (Synthroid)  25 mcg  DAILY@06


 PO


   4/3/21 06:00


    4/9/21 06:03





 


 Lidocaine


  (Lidoderm Patch)  1 patch  DAILY


 TD


   4/7/21 12:25


    4/8/21 08:04





 


 Magnesium Oxide


  (Mag-Ox)  400 mg  DAILY@1200


 PO


   4/3/21 12:00


    4/9/21 12:40





 


 Montelukast Sodium


  (Singulair)  10 mg  DAILY@1800


 PO


   4/2/21 18:00


    4/8/21 16:55





 


 Multivitamins


  (Theragram-M)  1 tab  DAILY@1200


 PO


   4/2/21 12:00


 4/2/21 16:55 DC  





 


 Multivitamins


  (Theragram-M)  1 tab  DAILY@1200


 PO


   4/3/21 12:00


    4/9/21 12:40





 


 Non-Formulary


 Medication


  (** See Comment


 Field Below **)  REMOVE


 LIDODERM


 PATCH  DAILY@21


 XX


   4/7/21 21:00


    4/8/21 20:55





 


 Pantoprazole


 Sodium


  (Protonix)  40 mg  DAILY


 PO


   4/1/21 09:00


 4/2/21 16:46 DC  





 


 Pantoprazole


 Sodium


  (Protonix)  40 mg  DAILY


 PO


   4/3/21 09:00


    4/9/21 09:51





 


 Polyethylene


 Glycol


  (Miralax)  1 pkt  DAILY  PRN


 PO


 CONSTIPATION  3/31/21 16:50


 4/2/21 16:51 DC  





 


 Polyethylene


 Glycol


  (Miralax)  1 pkt  DAILY  PRN


 PO


 CONSTIPATION  4/2/21 16:55


    4/4/21 17:25





 


 Rosuvastatin


 Calcium


  (Crestor)  5 mg  DAILY@1800


 PO


   4/2/21 18:00


    4/8/21 16:56





 


 Senna


  (Senokot)  1 tab  QHS


 PO


   3/31/21 21:00


 4/2/21 16:47 DC  





 


 Senna


  (Senokot)  1 tab  QHS


 PO


   4/2/21 21:00


    4/8/21 20:53





 


 Trazodone HCl


  (Desyrel)  50 mg  QHS@1800


 PO


   4/2/21 18:00


    4/8/21 16:55




















TAMAR COTE MD          Apr 9, 2021 14:30

## 2021-04-10 VITALS — SYSTOLIC BLOOD PRESSURE: 137 MMHG | DIASTOLIC BLOOD PRESSURE: 66 MMHG

## 2021-04-10 VITALS — SYSTOLIC BLOOD PRESSURE: 140 MMHG | DIASTOLIC BLOOD PRESSURE: 65 MMHG

## 2021-04-10 VITALS — DIASTOLIC BLOOD PRESSURE: 60 MMHG | SYSTOLIC BLOOD PRESSURE: 128 MMHG

## 2021-04-10 RX ADMIN — GABAPENTIN SCH MG: 100 CAPSULE ORAL at 12:32

## 2021-04-10 RX ADMIN — DOCUSATE SODIUM SCH MG: 100 CAPSULE, LIQUID FILLED ORAL at 20:57

## 2021-04-10 RX ADMIN — MONTELUKAST SODIUM SCH MG: 10 TABLET, FILM COATED ORAL at 20:56

## 2021-04-10 RX ADMIN — ACETAMINOPHEN SCH MG: 500 TABLET ORAL at 12:29

## 2021-04-10 RX ADMIN — LABETALOL HCL SCH MG: 200 TABLET, FILM COATED ORAL at 12:33

## 2021-04-10 RX ADMIN — MAGNESIUM OXIDE SCH MG: 400 TABLET ORAL at 12:34

## 2021-04-10 RX ADMIN — LIDOCAINE SCH PATCH: 50 PATCH CUTANEOUS at 09:00

## 2021-04-10 RX ADMIN — LABETALOL HCL SCH MG: 200 TABLET, FILM COATED ORAL at 20:56

## 2021-04-10 RX ADMIN — GABAPENTIN SCH MG: 100 CAPSULE ORAL at 20:56

## 2021-04-10 RX ADMIN — INSULIN LISPRO SCH UNITS: 100 INJECTION, SOLUTION INTRAVENOUS; SUBCUTANEOUS at 07:30

## 2021-04-10 RX ADMIN — SENNOSIDES SCH TAB: 8.6 TABLET, FILM COATED ORAL at 20:57

## 2021-04-10 RX ADMIN — MULTIPLE VITAMINS W/ MINERALS TAB SCH TAB: TAB at 12:35

## 2021-04-10 RX ADMIN — ESCITALOPRAM OXALATE SCH MG: 10 TABLET, FILM COATED ORAL at 20:57

## 2021-04-10 RX ADMIN — FERROUS SULFATE TAB 325 MG (65 MG ELEMENTAL FE) SCH MG: 325 (65 FE) TAB at 12:34

## 2021-04-10 RX ADMIN — ACETAMINOPHEN SCH MG: 500 TABLET ORAL at 09:00

## 2021-04-10 RX ADMIN — ASPIRIN SCH MG: 81 TABLET ORAL at 12:34

## 2021-04-10 RX ADMIN — INSULIN LISPRO SCH UNITS: 100 INJECTION, SOLUTION INTRAVENOUS; SUBCUTANEOUS at 17:30

## 2021-04-10 RX ADMIN — WHITE PETROLATUM SCH DOSE: 57; 17 PASTE TOPICAL at 12:39

## 2021-04-10 RX ADMIN — ACETAMINOPHEN SCH MG: 500 TABLET ORAL at 20:55

## 2021-04-10 RX ADMIN — DOCUSATE SODIUM SCH MG: 100 CAPSULE, LIQUID FILLED ORAL at 12:34

## 2021-04-10 RX ADMIN — Medication SCH MG: at 12:34

## 2021-04-10 RX ADMIN — WHITE PETROLATUM SCH DOSE: 57; 17 PASTE TOPICAL at 21:00

## 2021-04-10 RX ADMIN — ROSUVASTATIN CALCIUM SCH MG: 10 TABLET, FILM COATED ORAL at 20:56

## 2021-04-10 RX ADMIN — ACETAMINOPHEN SCH MG: 500 TABLET ORAL at 17:00

## 2021-04-10 RX ADMIN — INSULIN LISPRO SCH UNITS: 100 INJECTION, SOLUTION INTRAVENOUS; SUBCUTANEOUS at 20:57

## 2021-04-10 RX ADMIN — LEVOTHYROXINE SODIUM SCH MCG: 25 TABLET ORAL at 05:39

## 2021-04-10 RX ADMIN — WHITE PETROLATUM SCH DOSE: 57; 17 PASTE TOPICAL at 16:00

## 2021-04-10 RX ADMIN — Medication SCH: at 20:58

## 2021-04-10 RX ADMIN — PANTOPRAZOLE SODIUM SCH MG: 40 TABLET, DELAYED RELEASE ORAL at 12:34

## 2021-04-10 RX ADMIN — INSULIN LISPRO SCH UNITS: 100 INJECTION, SOLUTION INTRAVENOUS; SUBCUTANEOUS at 12:00

## 2021-04-11 VITALS — SYSTOLIC BLOOD PRESSURE: 158 MMHG | DIASTOLIC BLOOD PRESSURE: 73 MMHG

## 2021-04-11 VITALS — DIASTOLIC BLOOD PRESSURE: 62 MMHG | SYSTOLIC BLOOD PRESSURE: 138 MMHG

## 2021-04-11 VITALS — SYSTOLIC BLOOD PRESSURE: 156 MMHG | DIASTOLIC BLOOD PRESSURE: 67 MMHG

## 2021-04-11 RX ADMIN — INSULIN LISPRO SCH UNITS: 100 INJECTION, SOLUTION INTRAVENOUS; SUBCUTANEOUS at 17:30

## 2021-04-11 RX ADMIN — ESCITALOPRAM OXALATE SCH MG: 10 TABLET, FILM COATED ORAL at 21:07

## 2021-04-11 RX ADMIN — LEVOTHYROXINE SODIUM SCH MCG: 25 TABLET ORAL at 05:21

## 2021-04-11 RX ADMIN — FERROUS SULFATE TAB 325 MG (65 MG ELEMENTAL FE) SCH MG: 325 (65 FE) TAB at 09:00

## 2021-04-11 RX ADMIN — GABAPENTIN SCH MG: 100 CAPSULE ORAL at 09:00

## 2021-04-11 RX ADMIN — ROSUVASTATIN CALCIUM SCH MG: 10 TABLET, FILM COATED ORAL at 21:10

## 2021-04-11 RX ADMIN — DOCUSATE SODIUM SCH MG: 100 CAPSULE, LIQUID FILLED ORAL at 09:00

## 2021-04-11 RX ADMIN — PANTOPRAZOLE SODIUM SCH MG: 40 TABLET, DELAYED RELEASE ORAL at 09:00

## 2021-04-11 RX ADMIN — DOCUSATE SODIUM SCH MG: 100 CAPSULE, LIQUID FILLED ORAL at 21:08

## 2021-04-11 RX ADMIN — INSULIN LISPRO SCH UNITS: 100 INJECTION, SOLUTION INTRAVENOUS; SUBCUTANEOUS at 21:00

## 2021-04-11 RX ADMIN — LABETALOL HCL SCH MG: 200 TABLET, FILM COATED ORAL at 21:08

## 2021-04-11 RX ADMIN — INSULIN LISPRO SCH UNITS: 100 INJECTION, SOLUTION INTRAVENOUS; SUBCUTANEOUS at 12:00

## 2021-04-11 RX ADMIN — Medication SCH MG: at 12:00

## 2021-04-11 RX ADMIN — WHITE PETROLATUM SCH DOSE: 57; 17 PASTE TOPICAL at 21:00

## 2021-04-11 RX ADMIN — WHITE PETROLATUM SCH DOSE: 57; 17 PASTE TOPICAL at 09:00

## 2021-04-11 RX ADMIN — ACETAMINOPHEN SCH MG: 500 TABLET ORAL at 21:08

## 2021-04-11 RX ADMIN — LIDOCAINE SCH PATCH: 50 PATCH CUTANEOUS at 09:00

## 2021-04-11 RX ADMIN — LIDOCAINE SCH PATCH: 50 PATCH CUTANEOUS at 23:49

## 2021-04-11 RX ADMIN — WHITE PETROLATUM SCH DOSE: 57; 17 PASTE TOPICAL at 16:00

## 2021-04-11 RX ADMIN — ACETAMINOPHEN SCH MG: 500 TABLET ORAL at 17:00

## 2021-04-11 RX ADMIN — ACETAMINOPHEN SCH MG: 500 TABLET ORAL at 09:00

## 2021-04-11 RX ADMIN — LABETALOL HCL SCH MG: 200 TABLET, FILM COATED ORAL at 09:00

## 2021-04-11 RX ADMIN — ASPIRIN SCH MG: 81 TABLET ORAL at 09:00

## 2021-04-11 RX ADMIN — MAGNESIUM OXIDE SCH MG: 400 TABLET ORAL at 12:00

## 2021-04-11 RX ADMIN — Medication SCH: at 21:00

## 2021-04-11 RX ADMIN — ACETAMINOPHEN SCH MG: 500 TABLET ORAL at 13:00

## 2021-04-11 RX ADMIN — INSULIN LISPRO SCH UNITS: 100 INJECTION, SOLUTION INTRAVENOUS; SUBCUTANEOUS at 07:30

## 2021-04-11 RX ADMIN — SENNOSIDES SCH TAB: 8.6 TABLET, FILM COATED ORAL at 21:08

## 2021-04-11 RX ADMIN — ASPIRIN SCH MG: 81 TABLET ORAL at 21:08

## 2021-04-11 RX ADMIN — MULTIPLE VITAMINS W/ MINERALS TAB SCH TAB: TAB at 12:00

## 2021-04-11 RX ADMIN — MONTELUKAST SODIUM SCH MG: 10 TABLET, FILM COATED ORAL at 21:07

## 2021-04-11 RX ADMIN — GABAPENTIN SCH MG: 100 CAPSULE ORAL at 21:07

## 2021-04-12 VITALS — SYSTOLIC BLOOD PRESSURE: 181 MMHG | DIASTOLIC BLOOD PRESSURE: 79 MMHG

## 2021-04-12 VITALS — SYSTOLIC BLOOD PRESSURE: 150 MMHG | DIASTOLIC BLOOD PRESSURE: 71 MMHG

## 2021-04-12 VITALS — SYSTOLIC BLOOD PRESSURE: 149 MMHG | DIASTOLIC BLOOD PRESSURE: 79 MMHG

## 2021-04-12 LAB
BASOPHILS # BLD AUTO: 0.1 10^3/UL (ref 0–0.2)
BASOPHILS NFR BLD AUTO: 0.9 % (ref 0–1)
BUN SERPL-MCNC: 14 MG/DL (ref 7–18)
CALCIUM SERPL-MCNC: 8.5 MG/DL (ref 8.8–10.2)
CHLORIDE SERPL-SCNC: 103 MEQ/L (ref 98–107)
CO2 SERPL-SCNC: 31 MEQ/L (ref 21–32)
CREAT SERPL-MCNC: 0.45 MG/DL (ref 0.55–1.3)
EOSINOPHIL # BLD AUTO: 0.1 10^3/UL (ref 0–0.5)
EOSINOPHIL NFR BLD AUTO: 1.5 % (ref 0–3)
GFR SERPL CREATININE-BSD FRML MDRD: > 60 ML/MIN/{1.73_M2} (ref 32–?)
GLUCOSE SERPL-MCNC: 117 MG/DL (ref 70–100)
HCT VFR BLD AUTO: 28 % (ref 36–47)
HGB BLD-MCNC: 8.7 G/DL (ref 12–15.5)
LYMPHOCYTES # BLD AUTO: 1.2 10^3/UL (ref 1.5–5)
LYMPHOCYTES NFR BLD AUTO: 18 % (ref 24–44)
MCH RBC QN AUTO: 31.2 PG (ref 27–33)
MCHC RBC AUTO-ENTMCNC: 31.1 G/DL (ref 32–36.5)
MCV RBC AUTO: 100.4 FL (ref 80–96)
MONOCYTES # BLD AUTO: 0.3 10^3/UL (ref 0–0.8)
MONOCYTES NFR BLD AUTO: 5.2 % (ref 2–8)
NEUTROPHILS # BLD AUTO: 4.9 10^3/UL (ref 1.5–8.5)
NEUTROPHILS NFR BLD AUTO: 73.8 % (ref 36–66)
PLATELET # BLD AUTO: 412 10^3/UL (ref 150–450)
POTASSIUM SERPL-SCNC: 4.3 MEQ/L (ref 3.5–5.1)
RBC # BLD AUTO: 2.79 10^6/UL (ref 4–5.4)
SODIUM SERPL-SCNC: 138 MEQ/L (ref 136–145)
WBC # BLD AUTO: 6.6 10^3/UL (ref 4–10)

## 2021-04-12 RX ADMIN — PANTOPRAZOLE SODIUM SCH MG: 40 TABLET, DELAYED RELEASE ORAL at 07:55

## 2021-04-12 RX ADMIN — ACETAMINOPHEN SCH MG: 500 TABLET ORAL at 21:51

## 2021-04-12 RX ADMIN — FERROUS SULFATE TAB 325 MG (65 MG ELEMENTAL FE) SCH MG: 325 (65 FE) TAB at 07:54

## 2021-04-12 RX ADMIN — INSULIN LISPRO SCH UNITS: 100 INJECTION, SOLUTION INTRAVENOUS; SUBCUTANEOUS at 21:00

## 2021-04-12 RX ADMIN — WHITE PETROLATUM SCH DOSE: 57; 17 PASTE TOPICAL at 21:53

## 2021-04-12 RX ADMIN — INSULIN LISPRO SCH UNITS: 100 INJECTION, SOLUTION INTRAVENOUS; SUBCUTANEOUS at 12:45

## 2021-04-12 RX ADMIN — DOCUSATE SODIUM SCH MG: 100 CAPSULE, LIQUID FILLED ORAL at 07:53

## 2021-04-12 RX ADMIN — ROSUVASTATIN CALCIUM SCH MG: 10 TABLET, FILM COATED ORAL at 17:20

## 2021-04-12 RX ADMIN — LABETALOL HCL SCH MG: 200 TABLET, FILM COATED ORAL at 21:50

## 2021-04-12 RX ADMIN — Medication SCH MG: at 12:44

## 2021-04-12 RX ADMIN — GABAPENTIN SCH MG: 100 CAPSULE ORAL at 07:55

## 2021-04-12 RX ADMIN — ASPIRIN SCH MG: 81 TABLET ORAL at 07:55

## 2021-04-12 RX ADMIN — LEVOTHYROXINE SODIUM SCH MCG: 25 TABLET ORAL at 05:26

## 2021-04-12 RX ADMIN — DOCUSATE SODIUM SCH MG: 100 CAPSULE, LIQUID FILLED ORAL at 21:51

## 2021-04-12 RX ADMIN — INSULIN LISPRO SCH UNITS: 100 INJECTION, SOLUTION INTRAVENOUS; SUBCUTANEOUS at 07:53

## 2021-04-12 RX ADMIN — ACETAMINOPHEN SCH MG: 500 TABLET ORAL at 17:21

## 2021-04-12 RX ADMIN — WHITE PETROLATUM SCH DOSE: 57; 17 PASTE TOPICAL at 07:56

## 2021-04-12 RX ADMIN — METHYLPHENIDATE HYDROCHLORIDE SCH MG: 5 TABLET ORAL at 07:54

## 2021-04-12 RX ADMIN — MULTIPLE VITAMINS W/ MINERALS TAB SCH TAB: TAB at 12:44

## 2021-04-12 RX ADMIN — MAGNESIUM OXIDE SCH MG: 400 TABLET ORAL at 12:43

## 2021-04-12 RX ADMIN — WHITE PETROLATUM SCH DOSE: 57; 17 PASTE TOPICAL at 17:21

## 2021-04-12 RX ADMIN — Medication SCH: at 21:54

## 2021-04-12 RX ADMIN — ACETAMINOPHEN SCH MG: 500 TABLET ORAL at 12:44

## 2021-04-12 RX ADMIN — MONTELUKAST SODIUM SCH MG: 10 TABLET, FILM COATED ORAL at 17:20

## 2021-04-12 RX ADMIN — ESCITALOPRAM OXALATE SCH MG: 10 TABLET, FILM COATED ORAL at 17:20

## 2021-04-12 RX ADMIN — SENNOSIDES SCH TAB: 8.6 TABLET, FILM COATED ORAL at 21:51

## 2021-04-12 RX ADMIN — LABETALOL HCL SCH MG: 200 TABLET, FILM COATED ORAL at 07:54

## 2021-04-12 RX ADMIN — ACETAMINOPHEN SCH MG: 500 TABLET ORAL at 07:55

## 2021-04-12 RX ADMIN — GABAPENTIN SCH MG: 100 CAPSULE ORAL at 21:51

## 2021-04-12 RX ADMIN — INSULIN LISPRO SCH UNITS: 100 INJECTION, SOLUTION INTRAVENOUS; SUBCUTANEOUS at 17:21

## 2021-04-12 NOTE — IPNPDOC
PM&R Progress Note


DATE OF SERVICE:  Apr 9, 2021


Physiatrist Progress Note


Subjective:





Patient seen in the gym stating she feels ok today, has no new complaints. 





REVIEW OF SYSTEMS: The following is a completed review of systems and has been 

reviewed. Review of systems otherwise unremarkable.





PAIN: Patient self reports +right hip pain (improving)


EYES: No recent vision changes.


EARS, NOSE, & THROAT: No throat pain, or dysphagia, or rhinorrhea


CARDIOVASCULAR: Denies chest pain or palpitations


PULMONARY: Denies shortness of breath


GASTROINTESTINAL: Denies constipation/diarrhea


GENITOURINARY: denies dysuria


MUSCULOSKELETAL:+right hip fracture


NEUROLOGICAL:+tardive dyskinesia


HEMATOLOGICAL:denies easy bruising


SKIN: right hip incision


PSYCHIATRIC: +depressed


All other review of systems found to be negative.











PHYSICAL EXAMINATION:


VITAL SIGNS: Please see below.


GENERAL: Pleasant and cooperative. No acute distress. 


HEENT: PERRL. Extraocular movements intact. Clear conjunctiva


CARDIOVASCULAR: Regular rate and rhythm. No murmurs, rubs, or gallops


LUNGS: Clear to auscultation bilaterally. No wheezes. No rhonchi


ABDOMEN: Soft, nontender, nondistended. Positive bowel sounds. Normal active 

bowel sounds


+suprapubic TTP


NEUROLOGICAL: Alert and oriented times three. Cranial nerves II through XII 

grossly intact. Sensation grossly intact


+tardive dyskinesia


EXTREMITIES: 5\5 strength bilateral upper extremities. 4\5 strength right ankle 

DF/PF (limited due to surgery) 5/5 strength in left lower extremity. 











ASSESSMENT:85-year-old F with past medical history of CAD, depression who 

presents status post right hip fracture s/p hemiarthroplasty





PLAN:


1. Rehab- PT/OT advance mobility and ADLs, hip precautions, strengthen/stretch/m

aintain ROM all 4 limbs- ambulating with RW


-SLP eval for cog 


2. Neuro- monitor for delirium, pt with hx of depression and recent refusal to 

take meds- taking meds on ARU and eating well


3. CArdio- hx of CAD s/p CABG c/u ASA and labetolol


-HTN- c/u amlodipine 


-HLD- c/u statin


-medicine consulted to assist in overall management


4. Resp- monitor for infection, encourage incentive spirometry


- Singulair 


5. GI ppx- protonix


6. DT ppx on ASA 81 daily and teds


-dopplers negative for DVT


7. Pain- tylenol and gabapentin, lidoderm patch to right groin


8. PSych- hx of depression, c/u lexapro started on last hospital admission, will

add low dose ritalin and monitor for side effects, per daughter patient has been

on many different medications including ritalin in the past for her depression- 

patient's mood is not interfering significantly with her therapy progression


9. Endo- hypothyroidism, c/u synthroid


10. - UA negative


11. Dispo- TBD


Allergies


Coded Allergies:  


     tramadol (Unverified  Allergy, Unknown, 3/26/21)





Vital Signs





Vital Signs








  Date Time  Temp Pulse Resp B/P (MAP) Pulse Ox O2 Delivery O2 Flow Rate FiO2


 


4/12/21 07:54  67  145/62    


 


4/12/21 05:59 97.1  18  98 Room Air  











Laboratory Data


CBC/BMP


Laboratory Tests


4/12/21 07:10








Labs 24H


Laboratory Tests 2


4/11/21 16:33: Bedside Glucose (Misc Panel) 113H


4/11/21 20:34: Bedside Glucose (Misc Panel) 158H


4/12/21 06:17: Bedside Glucose (Misc Panel) 112H


4/12/21 07:10: 


Immature Granulocyte % (Auto) 0.6, Neutrophils (%) (Auto) 73.8H, Lymphocytes (%)

(Auto) 18.0L, Monocytes (%) (Auto) 5.2, Eosinophils (%) (Auto) 1.5, Basophils 

(%) (Auto) 0.9, Neutrophils # (Auto) 4.9, Lymphocytes # (Auto) 1.2L, Monocytes #

(Auto) 0.3, Eosinophils # (Auto) 0.1, Basophils # (Auto) 0.1, Nucleated Red 

Blood Cells % (auto) 0.0, Anion Gap 4L, Glomerular Filtration Rate > 60.0, 

Calcium Level 8.5L


4/12/21 11:24: Bedside Glucose (Misc Panel) 106





Current Medications


Current Medications





Current Medications








 Medications


  (Trade)  Dose


 Ordered  Sig/Lucrecia


 Route


 PRN Reason  Start Time


 Stop Time Status Last Admin


Dose Admin


 


 Acetaminophen


  (Tylenol Tab)  1,000 mg  QID


 PO


   3/31/21 17:00


 4/2/21 16:44 DC  





 


 Acetaminophen


  (Tylenol Tab)  1,000 mg  QID


 PO


   4/2/21 17:00


    4/12/21 12:44





 


 Allopurinol


  (Zyloprim)  300 mg  DAILY


 PO


   4/1/21 09:00


 4/2/21 16:44 DC  





 


 Allopurinol


  (Zyloprim)  300 mg  DAILY


 PO


   4/3/21 09:00


    4/12/21 07:54





 


 Amlodipine


 Besylate


  (Norvasc)  10 mg  DAILY


 PO


   4/1/21 09:00


 4/2/21 16:45 DC  





 


 Amlodipine


 Besylate


  (Norvasc)  10 mg  DAILY


 PO


   4/3/21 09:00


 4/5/21 17:23 DC 4/5/21 08:18





 


 Aspirin


  (Ecotrin)  81 mg  DAILY


 PO


   4/1/21 09:00


 4/2/21 16:45 DC  





 


 Aspirin


  (Ecotrin)  81 mg  DAILY


 PO


   4/3/21 09:00


    4/12/21 07:55





 


 Calcium/Vitamin D


  (Oscal D)  500 mg  DAILY@1200


 PO


   4/1/21 12:00


 4/2/21 16:45 DC  





 


 Calcium/Vitamin D


  (Oscal D)  500 mg  DAILY@1200


 PO


   4/3/21 12:00


    4/12/21 12:44





 


 Dextrose


  (Dextrose 50%)  25 ml  ASDIRECTED  PRN


 IV


 SEE LABEL COMMENTS  3/31/21 16:50


 4/2/21 16:51 DC  





 


 Dextrose


  (Dextrose 50%)  25 ml  ASDIRECTED  PRN


 IV


 SEE LABEL COMMENTS  4/2/21 16:55


     





 


 Docusate Sodium


  (Colace)  100 mg  BID


 PO


   3/31/21 21:00


 4/2/21 16:47 DC  





 


 Docusate Sodium


  (Colace)  100 mg  BID


 PO


   4/2/21 21:00


    4/12/21 07:53





 


 Escitalopram


 Oxalate


  (Lexapro)  20 mg  DAILY@1800


 PO


   4/2/21 18:00


    4/11/21 21:07





 


 Ferrous Sulfate


  (Ferrous Sulfate)  325 mg  DAILY


 PO


   4/1/21 09:00


 4/2/21 16:48 DC  





 


 Ferrous Sulfate


  (Ferrous Sulfate)  325 mg  DAILY


 PO


   4/3/21 09:00


    4/12/21 07:54





 


 Gabapentin


  (Neurontin)  100 mg  BID


 PO


   3/31/21 21:00


 4/2/21 16:47 DC  





 


 Gabapentin


  (Neurontin)  100 mg  BID


 PO


   4/2/21 21:00


    4/12/21 07:55





 


 Glucagon


  (Glucagon)  1 mg  ASDIRECTED  PRN


 SC


 SEE LABEL COMMENTS  3/31/21 16:50


 4/2/21 16:51 DC  





 


 Glucagon


  (Glucagon)  1 mg  ASDIRECTED  PRN


 SC


 SEE LABEL COMMENTS  4/2/21 16:55


     





 


 Glucose


  (Glucose)  16 GM  ASDIRECTED  PRN


 PO


 SEE LABEL COMMENTS  3/31/21 16:50


 4/2/21 16:51 DC  





 


 Glucose


  (Glucose)  16 GM  ASDIRECTED  PRN


 PO


 SEE LABEL COMMENTS  4/2/21 16:55


     





 


 Hydralazine HCl


  (Apresoline)  25 mg  Q6H


 PO


   4/2/21 18:00


 4/3/21 08:42 DC 4/3/21 05:47





 


 Insulin Human


 Lispro


  (HumaLOG INSULIN)  SEE


 PROTOCOL


 TABLE  AC


 SC


   3/31/21 17:30


 4/2/21 16:48 DC  





 


 Insulin Human


 Lispro


  (HumaLOG INSULIN)  SEE


 PROTOCOL


 TABLE  AC


 SC


   4/2/21 17:30


    4/12/21 12:45





 


 Insulin Human


 Lispro


  (HumaLOG INSULIN)  SEE


 PROTOCOL


 [facility]


   3/31/21 21:00


 4/2/21 16:48 DC  





 


 Insulin Human


 Lispro


  (HumaLOG INSULIN)  SEE


 PROTOCOL


 [facility]  [facility]


   4/2/21 21:00


     





 


 Labetalol HCl


  (Normodyne,


 Trandate)  100 mg  BID


 PO


   3/31/21 21:00


 4/2/21 16:49 DC  





 


 Labetalol HCl


  (Normodyne,


 Trandate)  100 mg  BID


 PO


   4/2/21 21:00


 4/5/21 17:22 DC 4/5/21 10:47





 


 Labetalol HCl


  (Normodyne,


 Trandate)  200 mg  BID


 PO


   4/5/21 21:00


    4/12/21 07:54





 


 Levothyroxine


 Sodium


  (Synthroid)  25 mcg  DAILY@06


 PO


   4/1/21 06:00


 4/2/21 16:48 DC  





 


 Levothyroxine


 Sodium


  (Synthroid)  25 mcg  DAILY@06


 PO


   4/3/21 06:00


    4/12/21 05:26





 


 Lidocaine


  (Lidoderm Patch)  1 patch  DAILY


 TD


   4/7/21 12:25


    4/11/21 23:49





 


 Magnesium Oxide


  (Mag-Ox)  400 mg  DAILY@1200


 PO


   4/3/21 12:00


    4/12/21 12:43





 


 Methylphenidate


 HCl


  (Ritalin)  5 mg  QAM


 PO


   4/12/21 09:00


    4/12/21 07:54





 


 Montelukast Sodium


  (Singulair)  10 mg  DAILY@1800


 PO


   4/2/21 18:00


    4/11/21 21:07





 


 Multivitamins


  (Theragram-M)  1 tab  DAILY@1200


 PO


   4/2/21 12:00


 4/2/21 16:55 DC  





 


 Multivitamins


  (Theragram-M)  1 tab  DAILY@1200


 PO


   4/3/21 12:00


    4/12/21 12:44





 


 Non-Formulary


 Medication


  (** See Comment


 Field Below **)  REMOVE


 LIDODERM


 PATCH  DAILY@21


 XX


   4/7/21 21:00


    4/8/21 20:55





 


 Pantoprazole


 Sodium


  (Protonix)  40 mg  DAILY


 PO


   4/1/21 09:00


 4/2/21 16:46 DC  





 


 Pantoprazole


 Sodium


  (Protonix)  40 mg  DAILY


 PO


   4/3/21 09:00


    4/12/21 07:55





 


 Polyethylene


 Glycol


  (Miralax)  1 pkt  DAILY  PRN


 PO


 CONSTIPATION  3/31/21 16:50


 4/2/21 16:51 DC  





 


 Polyethylene


 Glycol


  (Miralax)  1 pkt  DAILY  PRN


 PO


 CONSTIPATION  4/2/21 16:55


    4/4/21 17:25





 


 Rosuvastatin


 Calcium


  (Crestor)  5 mg  DAILY@1800


 PO


   4/2/21 18:00


    4/11/21 21:10





 


 Senna


  (Senokot)  1 tab  QHS


 PO


   3/31/21 21:00


 4/2/21 16:47 DC  





 


 Senna


  (Senokot)  1 tab  QHS


 PO


   4/2/21 21:00


    4/11/21 21:08





 


 Trazodone HCl


  (Desyrel)  50 mg  QHS@1800


 PO


   4/2/21 18:00


    4/11/21 21:07




















TAMAR COTE MD         Apr 12, 2021 13:50

## 2021-04-13 VITALS — DIASTOLIC BLOOD PRESSURE: 78 MMHG | SYSTOLIC BLOOD PRESSURE: 148 MMHG

## 2021-04-13 VITALS — DIASTOLIC BLOOD PRESSURE: 66 MMHG | SYSTOLIC BLOOD PRESSURE: 146 MMHG

## 2021-04-13 VITALS — DIASTOLIC BLOOD PRESSURE: 72 MMHG | SYSTOLIC BLOOD PRESSURE: 148 MMHG

## 2021-04-13 RX ADMIN — INSULIN LISPRO SCH UNITS: 100 INJECTION, SOLUTION INTRAVENOUS; SUBCUTANEOUS at 20:43

## 2021-04-13 RX ADMIN — WHITE PETROLATUM SCH DOSE: 57; 17 PASTE TOPICAL at 16:00

## 2021-04-13 RX ADMIN — GABAPENTIN SCH MG: 100 CAPSULE ORAL at 08:04

## 2021-04-13 RX ADMIN — INSULIN LISPRO SCH UNITS: 100 INJECTION, SOLUTION INTRAVENOUS; SUBCUTANEOUS at 07:04

## 2021-04-13 RX ADMIN — LABETALOL HCL SCH MG: 200 TABLET, FILM COATED ORAL at 20:42

## 2021-04-13 RX ADMIN — ACETAMINOPHEN SCH MG: 500 TABLET ORAL at 17:52

## 2021-04-13 RX ADMIN — ACETAMINOPHEN SCH MG: 500 TABLET ORAL at 20:43

## 2021-04-13 RX ADMIN — LIDOCAINE SCH PATCH: 50 PATCH CUTANEOUS at 09:00

## 2021-04-13 RX ADMIN — WHITE PETROLATUM SCH DOSE: 57; 17 PASTE TOPICAL at 08:06

## 2021-04-13 RX ADMIN — WHITE PETROLATUM SCH DOSE: 57; 17 PASTE TOPICAL at 20:43

## 2021-04-13 RX ADMIN — LEVOTHYROXINE SODIUM SCH MCG: 25 TABLET ORAL at 05:44

## 2021-04-13 RX ADMIN — LABETALOL HCL SCH MG: 200 TABLET, FILM COATED ORAL at 08:06

## 2021-04-13 RX ADMIN — INSULIN LISPRO SCH UNITS: 100 INJECTION, SOLUTION INTRAVENOUS; SUBCUTANEOUS at 12:00

## 2021-04-13 RX ADMIN — ACETAMINOPHEN SCH MG: 500 TABLET ORAL at 12:55

## 2021-04-13 RX ADMIN — MULTIPLE VITAMINS W/ MINERALS TAB SCH TAB: TAB at 12:54

## 2021-04-13 RX ADMIN — ROSUVASTATIN CALCIUM SCH MG: 10 TABLET, FILM COATED ORAL at 17:51

## 2021-04-13 RX ADMIN — GABAPENTIN SCH MG: 100 CAPSULE ORAL at 20:41

## 2021-04-13 RX ADMIN — METHYLPHENIDATE HYDROCHLORIDE SCH MG: 5 TABLET ORAL at 08:04

## 2021-04-13 RX ADMIN — FERROUS SULFATE TAB 325 MG (65 MG ELEMENTAL FE) SCH MG: 325 (65 FE) TAB at 08:03

## 2021-04-13 RX ADMIN — MONTELUKAST SODIUM SCH MG: 10 TABLET, FILM COATED ORAL at 17:51

## 2021-04-13 RX ADMIN — PANTOPRAZOLE SODIUM SCH MG: 40 TABLET, DELAYED RELEASE ORAL at 08:03

## 2021-04-13 RX ADMIN — DOCUSATE SODIUM SCH MG: 100 CAPSULE, LIQUID FILLED ORAL at 08:04

## 2021-04-13 RX ADMIN — INSULIN LISPRO SCH UNITS: 100 INJECTION, SOLUTION INTRAVENOUS; SUBCUTANEOUS at 17:52

## 2021-04-13 RX ADMIN — ASPIRIN SCH MG: 81 TABLET ORAL at 08:04

## 2021-04-13 RX ADMIN — DOCUSATE SODIUM SCH MG: 100 CAPSULE, LIQUID FILLED ORAL at 20:41

## 2021-04-13 RX ADMIN — MAGNESIUM OXIDE SCH MG: 400 TABLET ORAL at 12:55

## 2021-04-13 RX ADMIN — Medication SCH MG: at 12:55

## 2021-04-13 RX ADMIN — ESCITALOPRAM OXALATE SCH MG: 10 TABLET, FILM COATED ORAL at 17:51

## 2021-04-13 RX ADMIN — SENNOSIDES SCH TAB: 8.6 TABLET, FILM COATED ORAL at 20:41

## 2021-04-13 RX ADMIN — ACETAMINOPHEN SCH MG: 500 TABLET ORAL at 08:05

## 2021-04-13 RX ADMIN — Medication SCH: at 20:44

## 2021-04-14 VITALS — SYSTOLIC BLOOD PRESSURE: 164 MMHG | DIASTOLIC BLOOD PRESSURE: 74 MMHG

## 2021-04-14 VITALS — DIASTOLIC BLOOD PRESSURE: 73 MMHG | SYSTOLIC BLOOD PRESSURE: 139 MMHG

## 2021-04-14 VITALS — SYSTOLIC BLOOD PRESSURE: 149 MMHG | DIASTOLIC BLOOD PRESSURE: 68 MMHG

## 2021-04-14 LAB
BASOPHILS # BLD AUTO: 0.1 10^3/UL (ref 0–0.2)
BASOPHILS NFR BLD AUTO: 0.8 % (ref 0–1)
BUN SERPL-MCNC: 14 MG/DL (ref 7–18)
CALCIUM SERPL-MCNC: 9.4 MG/DL (ref 8.8–10.2)
CHLORIDE SERPL-SCNC: 105 MEQ/L (ref 98–107)
CO2 SERPL-SCNC: 30 MEQ/L (ref 21–32)
CREAT SERPL-MCNC: 0.59 MG/DL (ref 0.55–1.3)
EOSINOPHIL # BLD AUTO: 0.1 10^3/UL (ref 0–0.5)
EOSINOPHIL NFR BLD AUTO: 0.9 % (ref 0–3)
GFR SERPL CREATININE-BSD FRML MDRD: > 60 ML/MIN/{1.73_M2} (ref 32–?)
GLUCOSE SERPL-MCNC: 98 MG/DL (ref 70–100)
HCT VFR BLD AUTO: 29.3 % (ref 36–47)
HGB BLD-MCNC: 9.1 G/DL (ref 12–15.5)
LYMPHOCYTES # BLD AUTO: 0.8 10^3/UL (ref 1.5–5)
LYMPHOCYTES NFR BLD AUTO: 10.2 % (ref 24–44)
MCH RBC QN AUTO: 31.9 PG (ref 27–33)
MCHC RBC AUTO-ENTMCNC: 31.1 G/DL (ref 32–36.5)
MCV RBC AUTO: 102.8 FL (ref 80–96)
MONOCYTES # BLD AUTO: 0.4 10^3/UL (ref 0–0.8)
MONOCYTES NFR BLD AUTO: 4.4 % (ref 2–8)
NEUTROPHILS # BLD AUTO: 6.6 10^3/UL (ref 1.5–8.5)
NEUTROPHILS NFR BLD AUTO: 83.2 % (ref 36–66)
PLATELET # BLD AUTO: 380 10^3/UL (ref 150–450)
POTASSIUM SERPL-SCNC: 4.3 MEQ/L (ref 3.5–5.1)
RBC # BLD AUTO: 2.85 10^6/UL (ref 4–5.4)
SODIUM SERPL-SCNC: 140 MEQ/L (ref 136–145)
T4 FREE SERPL-MCNC: 0.9 NG/DL (ref 0.76–1.46)
TSH SERPL DL<=0.005 MIU/L-ACNC: 2.88 UIU/ML (ref 0.36–3.74)
WBC # BLD AUTO: 7.9 10^3/UL (ref 4–10)

## 2021-04-14 RX ADMIN — INSULIN LISPRO SCH UNITS: 100 INJECTION, SOLUTION INTRAVENOUS; SUBCUTANEOUS at 21:00

## 2021-04-14 RX ADMIN — Medication SCH MG: at 11:59

## 2021-04-14 RX ADMIN — SENNOSIDES SCH TAB: 8.6 TABLET, FILM COATED ORAL at 21:33

## 2021-04-14 RX ADMIN — METHYLPHENIDATE HYDROCHLORIDE SCH MG: 5 TABLET ORAL at 08:33

## 2021-04-14 RX ADMIN — ASPIRIN SCH MG: 81 TABLET ORAL at 08:33

## 2021-04-14 RX ADMIN — MAGNESIUM OXIDE SCH MG: 400 TABLET ORAL at 11:59

## 2021-04-14 RX ADMIN — LEVOTHYROXINE SODIUM SCH MCG: 25 TABLET ORAL at 05:30

## 2021-04-14 RX ADMIN — INSULIN LISPRO SCH UNITS: 100 INJECTION, SOLUTION INTRAVENOUS; SUBCUTANEOUS at 11:59

## 2021-04-14 RX ADMIN — WHITE PETROLATUM SCH DOSE: 57; 17 PASTE TOPICAL at 21:00

## 2021-04-14 RX ADMIN — WHITE PETROLATUM SCH DOSE: 57; 17 PASTE TOPICAL at 17:06

## 2021-04-14 RX ADMIN — MONTELUKAST SODIUM SCH MG: 10 TABLET, FILM COATED ORAL at 17:09

## 2021-04-14 RX ADMIN — INSULIN LISPRO SCH UNITS: 100 INJECTION, SOLUTION INTRAVENOUS; SUBCUTANEOUS at 17:07

## 2021-04-14 RX ADMIN — LABETALOL HCL SCH MG: 200 TABLET, FILM COATED ORAL at 21:34

## 2021-04-14 RX ADMIN — LABETALOL HCL SCH MG: 200 TABLET, FILM COATED ORAL at 08:33

## 2021-04-14 RX ADMIN — FERROUS SULFATE TAB 325 MG (65 MG ELEMENTAL FE) SCH MG: 325 (65 FE) TAB at 08:33

## 2021-04-14 RX ADMIN — GABAPENTIN SCH MG: 100 CAPSULE ORAL at 08:33

## 2021-04-14 RX ADMIN — ESCITALOPRAM OXALATE SCH MG: 10 TABLET, FILM COATED ORAL at 17:07

## 2021-04-14 RX ADMIN — PANTOPRAZOLE SODIUM SCH MG: 40 TABLET, DELAYED RELEASE ORAL at 08:32

## 2021-04-14 RX ADMIN — DOCUSATE SODIUM SCH MG: 100 CAPSULE, LIQUID FILLED ORAL at 08:34

## 2021-04-14 RX ADMIN — ACETAMINOPHEN SCH MG: 500 TABLET ORAL at 11:58

## 2021-04-14 RX ADMIN — MULTIPLE VITAMINS W/ MINERALS TAB SCH TAB: TAB at 11:58

## 2021-04-14 RX ADMIN — WHITE PETROLATUM SCH DOSE: 57; 17 PASTE TOPICAL at 08:43

## 2021-04-14 RX ADMIN — INSULIN LISPRO SCH UNITS: 100 INJECTION, SOLUTION INTRAVENOUS; SUBCUTANEOUS at 08:32

## 2021-04-14 RX ADMIN — ACETAMINOPHEN SCH MG: 500 TABLET ORAL at 17:09

## 2021-04-14 RX ADMIN — Medication SCH: at 21:34

## 2021-04-14 RX ADMIN — LIDOCAINE SCH PATCH: 50 PATCH CUTANEOUS at 08:35

## 2021-04-14 RX ADMIN — DOCUSATE SODIUM SCH MG: 100 CAPSULE, LIQUID FILLED ORAL at 21:33

## 2021-04-14 RX ADMIN — ACETAMINOPHEN SCH MG: 500 TABLET ORAL at 21:33

## 2021-04-14 RX ADMIN — ACETAMINOPHEN SCH MG: 500 TABLET ORAL at 08:34

## 2021-04-14 RX ADMIN — ROSUVASTATIN CALCIUM SCH MG: 10 TABLET, FILM COATED ORAL at 17:08

## 2021-04-14 NOTE — IPNPDOC
PM&R Progress Note


DATE OF SERVICE:  Apr 14, 2021


Physiatrist Progress Note


Subjective:





Aptient reporting she has minimal pain and is agreeable to going home Monday 

when her granddaughter can get services set back up.





REVIEW OF SYSTEMS: The following is a completed review of systems and has been 

reviewed. Review of systems otherwise unremarkable.





PAIN: Patient self reports +right hip pain (improving)


EYES: No recent vision changes.


EARS, NOSE, & THROAT: No throat pain, or dysphagia, or rhinorrhea


CARDIOVASCULAR: Denies chest pain or palpitations


PULMONARY: Denies shortness of breath


GASTROINTESTINAL: Denies constipation/diarrhea


GENITOURINARY: denies dysuria


MUSCULOSKELETAL:+right hip fracture


NEUROLOGICAL:+tardive dyskinesia


HEMATOLOGICAL:denies easy bruising


SKIN: right hip incision


PSYCHIATRIC: +depressed


All other review of systems found to be negative.











PHYSICAL EXAMINATION:


VITAL SIGNS: Please see below.


GENERAL: Pleasant and cooperative. No acute distress. 


HEENT: PERRL. Extraocular movements intact. Clear conjunctiva


CARDIOVASCULAR: Regular rate and rhythm. No murmurs, rubs, or gallops


LUNGS: Clear to auscultation bilaterally. No wheezes. No rhonchi


ABDOMEN: Soft, nontender, nondistended. Positive bowel sounds. Normal active 

bowel sounds


+suprapubic TTP


NEUROLOGICAL: Alert and oriented times three. Cranial nerves II through XII 

grossly intact. Sensation grossly intact


+tardive dyskinesia


EXTREMITIES: 5\5 strength bilateral upper extremities. 4\5 strength right ankle 

DF/PF (limited due to surgery) 5/5 strength in left lower extremity. 











ASSESSMENT:85-year-old F with past medical history of CAD, depression who 

presents status post right hip fracture s/p hemiarthroplasty





PLAN:


1. Rehab- PT/OT advance mobility and ADLs, hip precautions, 

strengthen/stretch/maintain ROM all 4 limbs- ambulating with RW


-SLP eval for cog 


2. Neuro- monitor for delirium, pt with hx of depression and recent refusal to 

take meds- taking meds on ARU and eating well


3. CArdio- hx of CAD s/p CABG c/u ASA and labetolol


-HTN- c/u amlodipine 


-HLD- c/u statin


-medicine consulted to assist in overall management


4. Resp- monitor for infection, encourage incentive spirometry


- Singulair 


5. GI ppx- protonix


6. DT ppx on ASA 81 daily and teds


-dopplers negative for DVT


7. Pain- tylenol and gabapentin, lidoderm patch to right groin


8. PSych- hx of depression, c/u lexapro started on last hospital admission, will

increase dose of ritalin to 10mg daily- patient's mood is not interfering 

significantly with her therapy progression, have advised patient's daughter that

further management can be handles as an outpatient 


9. Endo- hypothyroidism, c/u synthroid


10. - UA negative


11. Dispo- 4-19-21 to home, patient's daughter has asked to d/c home Monday so 

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


Allergies


Coded Allergies:  


     tramadol (Unverified  Allergy, Unknown, 3/26/21)





Vital Signs





Vital Signs








  Date Time  Temp Pulse Resp B/P (MAP) Pulse Ox O2 Delivery O2 Flow Rate FiO2


 


4/14/21 08:33  68  155/66    


 


4/14/21 05:20 97.2  18  98 Room Air  











Laboratory Data


CBC/BMP


Laboratory Tests


4/14/21 10:04








Labs 24H


Laboratory Tests 2


4/13/21 16:50: Bedside Glucose (Misc Panel) 130H


4/13/21 20:00: Bedside Glucose (Misc Panel) 131H


4/14/21 05:17: Bedside Glucose (Misc Panel) 143H


4/14/21 10:04: 


Immature Granulocyte % (Auto) 0.5, Neutrophils (%) (Auto) 83.2H, Lymphocytes (%)

(Auto) 10.2L, Monocytes (%) (Auto) 4.4, Eosinophils (%) (Auto) 0.9, Basophils 

(%) (Auto) 0.8, Neutrophils # (Auto) 6.6, Lymphocytes # (Auto) 0.8L, Monocytes #

(Auto) 0.4, Eosinophils # (Auto) 0.1, Basophils # (Auto) 0.1, Nucleated Red 

Blood Cells % (auto) 0.0, Anion Gap 5L, Glomerular Filtration Rate > 60.0, 

Calcium Level 9.4, Thyroid Stimulating Hormone (TSH) 2.880, Free Thyroxine 0.90


4/14/21 11:31: Bedside Glucose (Misc Panel) 90





Current Medications


Current Medications





Current Medications








 Medications


  (Trade)  Dose


 Ordered  Sig/Lucrecia


 Route


 PRN Reason  Start Time


 Stop Time Status Last Admin


Dose Admin


 


 Acetaminophen


  (Tylenol Tab)  1,000 mg  QID


 PO


   3/31/21 17:00


 4/2/21 16:44 DC  





 


 Acetaminophen


  (Tylenol Tab)  1,000 mg  QID


 PO


   4/2/21 17:00


    4/14/21 11:58





 


 Allopurinol


  (Zyloprim)  300 mg  DAILY


 PO


   4/1/21 09:00


 4/2/21 16:44 DC  





 


 Allopurinol


  (Zyloprim)  300 mg  DAILY


 PO


   4/3/21 09:00


    4/14/21 08:34





 


 Amlodipine


 Besylate


  (Norvasc)  10 mg  DAILY


 PO


   4/1/21 09:00


 4/2/21 16:45 DC  





 


 Amlodipine


 Besylate


  (Norvasc)  10 mg  DAILY


 PO


   4/3/21 09:00


 4/5/21 17:23 DC 4/5/21 08:18





 


 Aspirin


  (Ecotrin)  81 mg  DAILY


 PO


   4/1/21 09:00


 4/2/21 16:45 DC  





 


 Aspirin


  (Ecotrin)  81 mg  DAILY


 PO


   4/3/21 09:00


    4/14/21 08:33





 


 Calcium/Vitamin D


  (Oscal D)  500 mg  DAILY@1200


 PO


   4/1/21 12:00


 4/2/21 16:45 DC  





 


 Calcium/Vitamin D


  (Oscal D)  500 mg  DAILY@1200


 PO


   4/3/21 12:00


    4/14/21 11:59





 


 Dextrose


  (Dextrose 50%)  25 ml  ASDIRECTED  PRN


 IV


 SEE LABEL COMMENTS  3/31/21 16:50


 4/2/21 16:51 DC  





 


 Dextrose


  (Dextrose 50%)  25 ml  ASDIRECTED  PRN


 IV


 SEE LABEL COMMENTS  4/2/21 16:55


     





 


 Docusate Sodium


  (Colace)  100 mg  BID


 PO


   3/31/21 21:00


 4/2/21 16:47 DC  





 


 Docusate Sodium


  (Colace)  100 mg  BID


 PO


   4/2/21 21:00


    4/14/21 08:34





 


 Escitalopram


 Oxalate


  (Lexapro)  20 mg  DAILY@1800


 PO


   4/2/21 18:00


    4/13/21 17:51





 


 Ferrous Sulfate


  (Ferrous Sulfate)  325 mg  DAILY


 PO


   4/1/21 09:00


 4/2/21 16:48 DC  





 


 Ferrous Sulfate


  (Ferrous Sulfate)  325 mg  DAILY


 PO


   4/3/21 09:00


    4/14/21 08:33





 


 Gabapentin


  (Neurontin)  100 mg  BID


 PO


   3/31/21 21:00


 4/2/21 16:47 DC  





 


 Gabapentin


  (Neurontin)  100 mg  BID


 PO


   4/2/21 21:00


 4/14/21 09:39 DC 4/14/21 08:33





 


 Glucagon


  (Glucagon)  1 mg  ASDIRECTED  PRN


 SC


 SEE LABEL COMMENTS  3/31/21 16:50


 4/2/21 16:51 DC  





 


 Glucagon


  (Glucagon)  1 mg  ASDIRECTED  PRN


 SC


 SEE LABEL COMMENTS  4/2/21 16:55


     





 


 Glucose


  (Glucose)  16 GM  ASDIRECTED  PRN


 PO


 SEE LABEL COMMENTS  3/31/21 16:50


 4/2/21 16:51 DC  





 


 Glucose


  (Glucose)  16 GM  ASDIRECTED  PRN


 PO


 SEE LABEL COMMENTS  4/2/21 16:55


     





 


 Hydralazine HCl


  (Apresoline)  25 mg  Q6H


 PO


   4/2/21 18:00


 4/3/21 08:42 DC 4/3/21 05:47





 


 Insulin Human


 Lispro


  (HumaLOG INSULIN)  SEE


 PROTOCOL


 TABLE  AC


 SC


   3/31/21 17:30


 4/2/21 16:48 DC  





 


 Insulin Human


 Lispro


  (HumaLOG INSULIN)  SEE


 PROTOCOL


 TABLE  AC


 SC


   4/2/21 17:30


    4/14/21 08:32





 


 Insulin Human


 Lispro


  (HumaLOG INSULIN)  SEE


 PROTOCOL


 [facility]


   3/31/21 21:00


 4/2/21 16:48 DC  





 


 Insulin Human


 Lispro


  (HumaLOG INSULIN)  SEE


 PROTOCOL


 [facility]  [facility]


   4/2/21 21:00


     





 


 Labetalol HCl


  (Normodyne,


 Trandate)  100 mg  BID


 PO


   3/31/21 21:00


 4/2/21 16:49 DC  





 


 Labetalol HCl


  (Normodyne,


 Trandate)  100 mg  BID


 PO


   4/2/21 21:00


 4/5/21 17:22 DC 4/5/21 10:47





 


 Labetalol HCl


  (Normodyne,


 Trandate)  200 mg  BID


 PO


   4/5/21 21:00


    4/14/21 08:33





 


 Levothyroxine


 Sodium


  (Synthroid)  25 mcg  DAILY@06


 PO


   4/1/21 06:00


 4/2/21 16:48 DC  





 


 Levothyroxine


 Sodium


  (Synthroid)  25 mcg  DAILY@06


 PO


   4/3/21 06:00


    4/14/21 05:30





 


 Lidocaine


  (Lidoderm Patch)  1 patch  DAILY


 TD


   4/7/21 12:25


    4/14/21 08:35





 


 Magnesium Oxide


  (Mag-Ox)  400 mg  DAILY@1200


 PO


   4/3/21 12:00


    4/14/21 11:59





 


 Methylphenidate


 HCl


  (Ritalin)  5 mg  QAM


 PO


   4/12/21 09:00


 4/14/21 09:39 DC 4/14/21 08:33





 


 Methylphenidate


 HCl


  (Ritalin)  10 mg  QAM


 PO


   4/15/21 09:00


     





 


 Montelukast Sodium


  (Singulair)  10 mg  DAILY@1800


 PO


   4/2/21 18:00


    4/13/21 17:51





 


 Multivitamins


  (Theragram-M)  1 tab  DAILY@1200


 PO


   4/2/21 12:00


 4/2/21 16:55 DC  





 


 Multivitamins


  (Theragram-M)  1 tab  DAILY@1200


 PO


   4/3/21 12:00


    4/14/21 11:58





 


 Non-Formulary


 Medication


  (** See Comment


 Field Below **)  REMOVE


 LIDODERM


 PATCH  DAILY@21


 XX


   4/7/21 21:00


    4/13/21 20:44





 


 Pantoprazole


 Sodium


  (Protonix)  40 mg  DAILY


 PO


   4/1/21 09:00


 4/2/21 16:46 DC  





 


 Pantoprazole


 Sodium


  (Protonix)  40 mg  DAILY


 PO


   4/3/21 09:00


    4/14/21 08:32





 


 Polyethylene


 Glycol


  (Miralax)  1 pkt  DAILY  PRN


 PO


 CONSTIPATION  3/31/21 16:50


 4/2/21 16:51 DC  





 


 Polyethylene


 Glycol


  (Miralax)  1 pkt  DAILY  PRN


 PO


 CONSTIPATION  4/2/21 16:55


    4/4/21 17:25





 


 Rosuvastatin


 Calcium


  (Crestor)  5 mg  DAILY@1800


 PO


   4/2/21 18:00


    4/13/21 17:51





 


 Senna


  (Senokot)  1 tab  QHS


 PO


   3/31/21 21:00


 4/2/21 16:47 DC  





 


 Senna


  (Senokot)  1 tab  QHS


 PO


   4/2/21 21:00


    4/13/21 20:41





 


 Trazodone HCl


  (Desyrel)  50 mg  QHS@1800


 PO


   4/2/21 18:00


    4/13/21 17:51




















TAMAR COTE MD         Apr 14, 2021 12:58

## 2021-04-14 NOTE — IPNPDOC
PM&R Progress Note


DATE OF SERVICE:  Apr 13, 2021


Physiatrist Progress Note


Subjective:





Patient stating she feels like she has a little more energy today. 





REVIEW OF SYSTEMS: The following is a completed review of systems and has been 

reviewed. Review of systems otherwise unremarkable.





PAIN: Patient self reports +right hip pain (improving)


EYES: No recent vision changes.


EARS, NOSE, & THROAT: No throat pain, or dysphagia, or rhinorrhea


CARDIOVASCULAR: Denies chest pain or palpitations


PULMONARY: Denies shortness of breath


GASTROINTESTINAL: Denies constipation/diarrhea


GENITOURINARY: denies dysuria


MUSCULOSKELETAL:+right hip fracture


NEUROLOGICAL:+tardive dyskinesia


HEMATOLOGICAL:denies easy bruising


SKIN: right hip incision


PSYCHIATRIC: +depressed


All other review of systems found to be negative.











PHYSICAL EXAMINATION:


VITAL SIGNS: Please see below.


GENERAL: Pleasant and cooperative. No acute distress. 


HEENT: PERRL. Extraocular movements intact. Clear conjunctiva


CARDIOVASCULAR: Regular rate and rhythm. No murmurs, rubs, or gallops


LUNGS: Clear to auscultation bilaterally. No wheezes. No rhonchi


ABDOMEN: Soft, nontender, nondistended. Positive bowel sounds. Normal active 

bowel sounds


+suprapubic TTP


NEUROLOGICAL: Alert and oriented times three. Cranial nerves II through XII 

grossly intact. Sensation grossly intact


+tardive dyskinesia


EXTREMITIES: 5\5 strength bilateral upper extremities. 4\5 strength right ankle 

DF/PF (limited due to surgery) 5/5 strength in left lower extremity. 











ASSESSMENT:85-year-old F with past medical history of CAD, depression who 

presents status post right hip fracture s/p hemiarthroplasty





PLAN:


1. Rehab- PT/OT advance mobility and ADLs, hip precautions, 

strengthen/stretch/maintain ROM all 4 limbs- ambulating with RW


-SLP eval for cog 


2. Neuro- monitor for delirium, pt with hx of depression and recent refusal to 

take meds- taking meds on ARU and eating well


3. CArdio- hx of CAD s/p CABG c/u ASA and labetolol


-HTN- c/u amlodipine 


-HLD- c/u statin


-medicine consulted to assist in overall management


4. Resp- monitor for infection, encourage incentive spirometry


- Singulair 


5. GI ppx- protonix


6. DT ppx on ASA 81 daily and teds


-dopplers negative for DVT


7. Pain- tylenol and gabapentin, lidoderm patch to right groin


8. PSych- hx of depression, c/u lexapro started on last hospital admission, c/u 

low dose ritalin and monitor for side effects, per daughter patient has been on 

many different medications including ritalin in the past for her depression- 

patient's mood is not interfering significantly with her therapy progression


9. Endo- hypothyroidism, c/u synthroid


10. - UA negative


11. Dispo- TBD


Allergies


Coded Allergies:  


     tramadol (Unverified  Allergy, Unknown, 3/26/21)





Vital Signs





Vital Signs








  Date Time  Temp Pulse Resp B/P (MAP) Pulse Ox O2 Delivery O2 Flow Rate FiO2


 


4/14/21 08:33  68  155/66    


 


4/14/21 05:20 97.2  18  98 Room Air  











Laboratory Data


CBC/BMP


Laboratory Tests


4/14/21 10:04








Labs 24H


Laboratory Tests 2


4/13/21 16:50: Bedside Glucose (Misc Panel) 130H


4/13/21 20:00: Bedside Glucose (Misc Panel) 131H


4/14/21 05:17: Bedside Glucose (Misc Panel) 143H


4/14/21 10:04: 


Immature Granulocyte % (Auto) 0.5, Neutrophils (%) (Auto) 83.2H, Lymphocytes (%)

(Auto) 10.2L, Monocytes (%) (Auto) 4.4, Eosinophils (%) (Auto) 0.9, Basophils 

(%) (Auto) 0.8, Neutrophils # (Auto) 6.6, Lymphocytes # (Auto) 0.8L, Monocytes #

(Auto) 0.4, Eosinophils # (Auto) 0.1, Basophils # (Auto) 0.1, Nucleated Red 

Blood Cells % (auto) 0.0, Anion Gap 5L, Glomerular Filtration Rate > 60.0, 

Calcium Level 9.4, Thyroid Stimulating Hormone (TSH) 2.880, Free Thyroxine 0.90


4/14/21 11:31: Bedside Glucose (Misc Panel) 90





Current Medications


Current Medications





Current Medications








 Medications


  (Trade)  Dose


 Ordered  Sig/Lucrecia


 Route


 PRN Reason  Start Time


 Stop Time Status Last Admin


Dose Admin


 


 Acetaminophen


  (Tylenol Tab)  1,000 mg  QID


 PO


   3/31/21 17:00


 4/2/21 16:44 DC  





 


 Acetaminophen


  (Tylenol Tab)  1,000 mg  QID


 PO


   4/2/21 17:00


    4/14/21 11:58





 


 Allopurinol


  (Zyloprim)  300 mg  DAILY


 PO


   4/1/21 09:00


 4/2/21 16:44 DC  





 


 Allopurinol


  (Zyloprim)  300 mg  DAILY


 PO


   4/3/21 09:00


    4/14/21 08:34





 


 Amlodipine


 Besylate


  (Norvasc)  10 mg  DAILY


 PO


   4/1/21 09:00


 4/2/21 16:45 DC  





 


 Amlodipine


 Besylate


  (Norvasc)  10 mg  DAILY


 PO


   4/3/21 09:00


 4/5/21 17:23 DC 4/5/21 08:18





 


 Aspirin


  (Ecotrin)  81 mg  DAILY


 PO


   4/1/21 09:00


 4/2/21 16:45 DC  





 


 Aspirin


  (Ecotrin)  81 mg  DAILY


 PO


   4/3/21 09:00


    4/14/21 08:33





 


 Calcium/Vitamin D


  (Oscal D)  500 mg  DAILY@1200


 PO


   4/1/21 12:00


 4/2/21 16:45 DC  





 


 Calcium/Vitamin D


  (Oscal D)  500 mg  DAILY@1200


 PO


   4/3/21 12:00


    4/14/21 11:59





 


 Dextrose


  (Dextrose 50%)  25 ml  ASDIRECTED  PRN


 IV


 SEE LABEL COMMENTS  3/31/21 16:50


 4/2/21 16:51 DC  





 


 Dextrose


  (Dextrose 50%)  25 ml  ASDIRECTED  PRN


 IV


 SEE LABEL COMMENTS  4/2/21 16:55


     





 


 Docusate Sodium


  (Colace)  100 mg  BID


 PO


   3/31/21 21:00


 4/2/21 16:47 DC  





 


 Docusate Sodium


  (Colace)  100 mg  BID


 PO


   4/2/21 21:00


    4/14/21 08:34





 


 Escitalopram


 Oxalate


  (Lexapro)  20 mg  DAILY@1800


 PO


   4/2/21 18:00


    4/13/21 17:51





 


 Ferrous Sulfate


  (Ferrous Sulfate)  325 mg  DAILY


 PO


   4/1/21 09:00


 4/2/21 16:48 DC  





 


 Ferrous Sulfate


  (Ferrous Sulfate)  325 mg  DAILY


 PO


   4/3/21 09:00


    4/14/21 08:33





 


 Gabapentin


  (Neurontin)  100 mg  BID


 PO


   3/31/21 21:00


 4/2/21 16:47 DC  





 


 Gabapentin


  (Neurontin)  100 mg  BID


 PO


   4/2/21 21:00


 4/14/21 09:39 DC 4/14/21 08:33





 


 Glucagon


  (Glucagon)  1 mg  ASDIRECTED  PRN


 SC


 SEE LABEL COMMENTS  3/31/21 16:50


 4/2/21 16:51 DC  





 


 Glucagon


  (Glucagon)  1 mg  ASDIRECTED  PRN


 SC


 SEE LABEL COMMENTS  4/2/21 16:55


     





 


 Glucose


  (Glucose)  16 GM  ASDIRECTED  PRN


 PO


 SEE LABEL COMMENTS  3/31/21 16:50


 4/2/21 16:51 DC  





 


 Glucose


  (Glucose)  16 GM  ASDIRECTED  PRN


 PO


 SEE LABEL COMMENTS  4/2/21 16:55


     





 


 Hydralazine HCl


  (Apresoline)  25 mg  Q6H


 PO


   4/2/21 18:00


 4/3/21 08:42 DC 4/3/21 05:47





 


 Insulin Human


 Lispro


  (HumaLOG INSULIN)  SEE


 PROTOCOL


 TABLE  AC


 SC


   3/31/21 17:30


 4/2/21 16:48 DC  





 


 Insulin Human


 Lispro


  (HumaLOG INSULIN)  SEE


 PROTOCOL


 TABLE  AC


 SC


   4/2/21 17:30


    4/14/21 08:32





 


 Insulin Human


 Lispro


  (HumaLOG INSULIN)  SEE


 PROTOCOL


 [facility]


   3/31/21 21:00


 4/2/21 16:48 DC  





 


 Insulin Human


 Lispro


  (HumaLOG INSULIN)  SEE


 PROTOCOL


 [facility]  [facility]


   4/2/21 21:00


     





 


 Labetalol HCl


  (Normodyne,


 Trandate)  100 mg  BID


 PO


   3/31/21 21:00


 4/2/21 16:49 DC  





 


 Labetalol HCl


  (Normodyne,


 Trandate)  100 mg  BID


 PO


   4/2/21 21:00


 4/5/21 17:22 DC 4/5/21 10:47





 


 Labetalol HCl


  (Normodyne,


 Trandate)  200 mg  BID


 PO


   4/5/21 21:00


    4/14/21 08:33





 


 Levothyroxine


 Sodium


  (Synthroid)  25 mcg  DAILY@06


 PO


   4/1/21 06:00


 4/2/21 16:48 DC  





 


 Levothyroxine


 Sodium


  (Synthroid)  25 mcg  DAILY@06


 PO


   4/3/21 06:00


    4/14/21 05:30





 


 Lidocaine


  (Lidoderm Patch)  1 patch  DAILY


 TD


   4/7/21 12:25


    4/14/21 08:35





 


 Magnesium Oxide


  (Mag-Ox)  400 mg  DAILY@1200


 PO


   4/3/21 12:00


    4/14/21 11:59





 


 Methylphenidate


 HCl


  (Ritalin)  5 mg  QAM


 PO


   4/12/21 09:00


 4/14/21 09:39 DC 4/14/21 08:33





 


 Methylphenidate


 HCl


  (Ritalin)  10 mg  QAM


 PO


   4/15/21 09:00


     





 


 Montelukast Sodium


  (Singulair)  10 mg  DAILY@1800


 PO


   4/2/21 18:00


    4/13/21 17:51





 


 Multivitamins


  (Theragram-M)  1 tab  DAILY@1200


 PO


   4/2/21 12:00


 4/2/21 16:55 DC  





 


 Multivitamins


  (Theragram-M)  1 tab  DAILY@1200


 PO


   4/3/21 12:00


    4/14/21 11:58





 


 Non-Formulary


 Medication


  (** See Comment


 Field Below **)  REMOVE


 LIDODERM


 PATCH  DAILY@21


 XX


   4/7/21 21:00


    4/13/21 20:44





 


 Pantoprazole


 Sodium


  (Protonix)  40 mg  DAILY


 PO


   4/1/21 09:00


 4/2/21 16:46 DC  





 


 Pantoprazole


 Sodium


  (Protonix)  40 mg  DAILY


 PO


   4/3/21 09:00


    4/14/21 08:32





 


 Polyethylene


 Glycol


  (Miralax)  1 pkt  DAILY  PRN


 PO


 CONSTIPATION  3/31/21 16:50


 4/2/21 16:51 DC  





 


 Polyethylene


 Glycol


  (Miralax)  1 pkt  DAILY  PRN


 PO


 CONSTIPATION  4/2/21 16:55


    4/4/21 17:25





 


 Rosuvastatin


 Calcium


  (Crestor)  5 mg  DAILY@1800


 PO


   4/2/21 18:00


    4/13/21 17:51





 


 Senna


  (Senokot)  1 tab  QHS


 PO


   3/31/21 21:00


 4/2/21 16:47 DC  





 


 Senna


  (Senokot)  1 tab  QHS


 PO


   4/2/21 21:00


    4/13/21 20:41





 


 Trazodone HCl


  (Desyrel)  50 mg  QHS@1800


 PO


   4/2/21 18:00


    4/13/21 17:51




















TAMAR COTE MD         Apr 14, 2021 12:56

## 2021-04-15 VITALS — DIASTOLIC BLOOD PRESSURE: 70 MMHG | SYSTOLIC BLOOD PRESSURE: 160 MMHG

## 2021-04-15 VITALS — DIASTOLIC BLOOD PRESSURE: 64 MMHG | SYSTOLIC BLOOD PRESSURE: 140 MMHG

## 2021-04-15 VITALS — SYSTOLIC BLOOD PRESSURE: 140 MMHG | DIASTOLIC BLOOD PRESSURE: 68 MMHG

## 2021-04-15 RX ADMIN — ASPIRIN SCH MG: 81 TABLET ORAL at 10:16

## 2021-04-15 RX ADMIN — MONTELUKAST SODIUM SCH MG: 10 TABLET, FILM COATED ORAL at 17:27

## 2021-04-15 RX ADMIN — ESCITALOPRAM OXALATE SCH MG: 10 TABLET, FILM COATED ORAL at 17:23

## 2021-04-15 RX ADMIN — ROSUVASTATIN CALCIUM SCH MG: 10 TABLET, FILM COATED ORAL at 17:24

## 2021-04-15 RX ADMIN — MULTIPLE VITAMINS W/ MINERALS TAB SCH TAB: TAB at 10:27

## 2021-04-15 RX ADMIN — WHITE PETROLATUM SCH DOSE: 57; 17 PASTE TOPICAL at 20:53

## 2021-04-15 RX ADMIN — Medication SCH: at 20:54

## 2021-04-15 RX ADMIN — ACETAMINOPHEN SCH MG: 500 TABLET ORAL at 11:14

## 2021-04-15 RX ADMIN — Medication SCH MG: at 10:27

## 2021-04-15 RX ADMIN — SENNOSIDES SCH TAB: 8.6 TABLET, FILM COATED ORAL at 20:52

## 2021-04-15 RX ADMIN — LIDOCAINE SCH PATCH: 50 PATCH CUTANEOUS at 09:00

## 2021-04-15 RX ADMIN — LABETALOL HCL SCH MG: 200 TABLET, FILM COATED ORAL at 10:15

## 2021-04-15 RX ADMIN — INSULIN LISPRO SCH UNITS: 100 INJECTION, SOLUTION INTRAVENOUS; SUBCUTANEOUS at 12:00

## 2021-04-15 RX ADMIN — PANTOPRAZOLE SODIUM SCH MG: 40 TABLET, DELAYED RELEASE ORAL at 10:16

## 2021-04-15 RX ADMIN — WHITE PETROLATUM SCH DOSE: 57; 17 PASTE TOPICAL at 17:28

## 2021-04-15 RX ADMIN — MAGNESIUM OXIDE SCH MG: 400 TABLET ORAL at 10:28

## 2021-04-15 RX ADMIN — INSULIN LISPRO SCH UNITS: 100 INJECTION, SOLUTION INTRAVENOUS; SUBCUTANEOUS at 20:53

## 2021-04-15 RX ADMIN — ACETAMINOPHEN SCH MG: 500 TABLET ORAL at 20:52

## 2021-04-15 RX ADMIN — INSULIN LISPRO SCH UNITS: 100 INJECTION, SOLUTION INTRAVENOUS; SUBCUTANEOUS at 07:30

## 2021-04-15 RX ADMIN — METHYLPHENIDATE HYDROCHLORIDE SCH MG: 5 TABLET ORAL at 10:14

## 2021-04-15 RX ADMIN — LABETALOL HCL SCH MG: 200 TABLET, FILM COATED ORAL at 20:52

## 2021-04-15 RX ADMIN — ACETAMINOPHEN SCH MG: 500 TABLET ORAL at 10:15

## 2021-04-15 RX ADMIN — INSULIN LISPRO SCH UNITS: 100 INJECTION, SOLUTION INTRAVENOUS; SUBCUTANEOUS at 17:26

## 2021-04-15 RX ADMIN — LEVOTHYROXINE SODIUM SCH MCG: 25 TABLET ORAL at 05:42

## 2021-04-15 RX ADMIN — DOCUSATE SODIUM SCH MG: 100 CAPSULE, LIQUID FILLED ORAL at 20:53

## 2021-04-15 RX ADMIN — FERROUS SULFATE TAB 325 MG (65 MG ELEMENTAL FE) SCH MG: 325 (65 FE) TAB at 10:16

## 2021-04-15 RX ADMIN — ACETAMINOPHEN SCH MG: 500 TABLET ORAL at 17:00

## 2021-04-15 RX ADMIN — DOCUSATE SODIUM SCH MG: 100 CAPSULE, LIQUID FILLED ORAL at 10:16

## 2021-04-15 RX ADMIN — WHITE PETROLATUM SCH DOSE: 57; 17 PASTE TOPICAL at 09:00

## 2021-04-15 NOTE — IPNPDOC
PM&R Progress Note


DATE OF SERVICE:  Apr 15, 2021


Physiatrist Progress Note


Subjective:





Patient stating she feels tired today, that she doesn't feel so good, but denies

abdominal pain, headache, increased irritability. 





REVIEW OF SYSTEMS: The following is a completed review of systems and has been 

reviewed. Review of systems otherwise unremarkable.





PAIN: Patient self reports +right hip pain (improving)


EYES: No recent vision changes.


EARS, NOSE, & THROAT: No throat pain, or dysphagia, or rhinorrhea


CARDIOVASCULAR: Denies chest pain or palpitations


PULMONARY: Denies shortness of breath


GASTROINTESTINAL: Denies constipation/diarrhea


GENITOURINARY: denies dysuria


MUSCULOSKELETAL:+right hip fracture


NEUROLOGICAL:+tardive dyskinesia


HEMATOLOGICAL:denies easy bruising


SKIN: right hip incision


PSYCHIATRIC: +depressed


All other review of systems found to be negative.











PHYSICAL EXAMINATION:


VITAL SIGNS: Please see below.


GENERAL: Pleasant and cooperative. No acute distress. 


HEENT: PERRL. Extraocular movements intact. Clear conjunctiva


CARDIOVASCULAR: Regular rate and rhythm. No murmurs, rubs, or gallops


LUNGS: Clear to auscultation bilaterally. No wheezes. No rhonchi


ABDOMEN: Soft, nontender, nondistended. Positive bowel sounds. Normal active 

bowel sounds


+suprapubic TTP


NEUROLOGICAL: Alert and oriented times three. Cranial nerves II through XII 

grossly intact. Sensation grossly intact


+tardive dyskinesia


EXTREMITIES: 5\5 strength bilateral upper extremities. 4\5 strength right ankle 

DF/PF (limited due to surgery) 5/5 strength in left lower extremity. 











ASSESSMENT:85-year-old F with past medical history of CAD, depression who 

presents status post right hip fracture s/p hemiarthroplasty





PLAN:


1. Rehab- PT/OT advance mobility and ADLs, hip precautions, 

strengthen/stretch/maintain ROM all 4 limbs- ambulating with RW


-SLP eval for cog 


2. Neuro- monitor for delirium, pt with hx of depression and recent refusal to 

take meds- taking meds on ARU and eating well


3. CArdio- hx of CAD s/p CABG c/u ASA and labetolol


-HTN- c/u amlodipine 


-HLD- c/u statin


-medicine consulted to assist in overall management


4. Resp- monitor for infection, encourage incentive spirometry


- Singulair 


5. GI ppx- protonix


6. DT ppx on ASA 81 daily and teds


-dopplers negative for DVT


7. Pain- tylenol and gabapentin, lidoderm patch to right groin


8. PSych- hx of depression, c/u lexapro started on last hospital admission, c/u 

dose of ritalin increased to 10mg daily- patient's mood is not interfering 

significantly with her therapy progression, have advised patient's daughter that

further management can be handles as an outpatient 


9. Endo- hypothyroidism, c/u synthroid


10. - UA negative


11. Dispo- 4-19-21 to home, patient's daughter has asked to d/c home Monday so 

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


Allergies


Coded Allergies:  


     tramadol (Unverified  Allergy, Unknown, 3/26/21)





Vital Signs





Vital Signs








  Date Time  Temp Pulse Resp B/P (MAP) Pulse Ox O2 Delivery O2 Flow Rate FiO2


 


4/15/21 10:15  68  160/70    


 


4/15/21 05:25 97.9  17  99 Room Air  











Laboratory Data


Labs 24H


Laboratory Tests 2


4/14/21 16:34: Bedside Glucose (Misc Panel) 152H


4/14/21 20:02: Bedside Glucose (Misc Panel) 121H


4/15/21 05:14: Bedside Glucose (Misc Panel) 115H


4/15/21 12:09: Bedside Glucose (Misc Panel) 146H





Current Medications


Current Medications





Current Medications








 Medications


  (Trade)  Dose


 Ordered  Sig/Lucrecia


 Route


 PRN Reason  Start Time


 Stop Time Status Last Admin


Dose Admin


 


 Acetaminophen


  (Tylenol Tab)  1,000 mg  QID


 PO


   3/31/21 17:00


 4/2/21 16:44 DC  





 


 Acetaminophen


  (Tylenol Tab)  1,000 mg  QID


 PO


   4/2/21 17:00


    4/15/21 10:15





 


 Allopurinol


  (Zyloprim)  300 mg  DAILY


 PO


   4/1/21 09:00


 4/2/21 16:44 DC  





 


 Allopurinol


  (Zyloprim)  300 mg  DAILY


 PO


   4/3/21 09:00


    4/15/21 10:28





 


 Amlodipine


 Besylate


  (Norvasc)  10 mg  DAILY


 PO


   4/1/21 09:00


 4/2/21 16:45 DC  





 


 Amlodipine


 Besylate


  (Norvasc)  10 mg  DAILY


 PO


   4/3/21 09:00


 4/5/21 17:23 DC 4/5/21 08:18





 


 Aspirin


  (Ecotrin)  81 mg  DAILY


 PO


   4/1/21 09:00


 4/2/21 16:45 DC  





 


 Aspirin


  (Ecotrin)  81 mg  DAILY


 PO


   4/3/21 09:00


    4/15/21 10:16





 


 Calcium/Vitamin D


  (Oscal D)  500 mg  DAILY@1200


 PO


   4/1/21 12:00


 4/2/21 16:45 DC  





 


 Calcium/Vitamin D


  (Oscal D)  500 mg  DAILY@1200


 PO


   4/3/21 12:00


    4/15/21 10:27





 


 Dextrose


  (Dextrose 50%)  25 ml  ASDIRECTED  PRN


 IV


 SEE LABEL COMMENTS  3/31/21 16:50


 4/2/21 16:51 DC  





 


 Dextrose


  (Dextrose 50%)  25 ml  ASDIRECTED  PRN


 IV


 SEE LABEL COMMENTS  4/2/21 16:55


     





 


 Docusate Sodium


  (Colace)  100 mg  BID


 PO


   3/31/21 21:00


 4/2/21 16:47 DC  





 


 Docusate Sodium


  (Colace)  100 mg  BID


 PO


   4/2/21 21:00


    4/15/21 10:16





 


 Escitalopram


 Oxalate


  (Lexapro)  20 mg  DAILY@1800


 PO


   4/2/21 18:00


    4/14/21 17:07





 


 Ferrous Sulfate


  (Ferrous Sulfate)  325 mg  DAILY


 PO


   4/1/21 09:00


 4/2/21 16:48 DC  





 


 Ferrous Sulfate


  (Ferrous Sulfate)  325 mg  DAILY


 PO


   4/3/21 09:00


    4/15/21 10:16





 


 Gabapentin


  (Neurontin)  100 mg  BID


 PO


   3/31/21 21:00


 4/2/21 16:47 DC  





 


 Gabapentin


  (Neurontin)  100 mg  BID


 PO


   4/2/21 21:00


 4/14/21 09:39 DC 4/14/21 08:33





 


 Glucagon


  (Glucagon)  1 mg  ASDIRECTED  PRN


 SC


 SEE LABEL COMMENTS  3/31/21 16:50


 4/2/21 16:51 DC  





 


 Glucagon


  (Glucagon)  1 mg  ASDIRECTED  PRN


 SC


 SEE LABEL COMMENTS  4/2/21 16:55


     





 


 Glucose


  (Glucose)  16 GM  ASDIRECTED  PRN


 PO


 SEE LABEL COMMENTS  3/31/21 16:50


 4/2/21 16:51 DC  





 


 Glucose


  (Glucose)  16 GM  ASDIRECTED  PRN


 PO


 SEE LABEL COMMENTS  4/2/21 16:55


     





 


 Hydralazine HCl


  (Apresoline)  25 mg  Q6H


 PO


   4/2/21 18:00


 4/3/21 08:42 DC 4/3/21 05:47





 


 Insulin Human


 Lispro


  (HumaLOG INSULIN)  SEE


 PROTOCOL


 TABLE  AC


 SC


   3/31/21 17:30


 4/2/21 16:48 DC  





 


 Insulin Human


 Lispro


  (HumaLOG INSULIN)  SEE


 PROTOCOL


 TABLE  AC


 SC


   4/2/21 17:30


    4/14/21 17:07





 


 Insulin Human


 Lispro


  (HumaLOG INSULIN)  SEE


 PROTOCOL


 TABLE  QHS


 SC


   3/31/21 21:00


 4/2/21 16:48 DC  





 


 Insulin Human


 Lispro


  (HumaLOG INSULIN)  SEE


 PROTOCOL


 TABLE  QHS


 SC


   4/2/21 21:00


     





 


 Labetalol HCl


  (Normodyne,


 Trandate)  100 mg  BID


 PO


   3/31/21 21:00


 4/2/21 16:49 DC  





 


 Labetalol HCl


  (Normodyne,


 Trandate)  100 mg  BID


 PO


   4/2/21 21:00


 4/5/21 17:22 DC 4/5/21 10:47





 


 Labetalol HCl


  (Normodyne,


 Trandate)  200 mg  BID


 PO


   4/5/21 21:00


    4/15/21 10:15





 


 Levothyroxine


 Sodium


  (Synthroid)  25 mcg  DAILY@06


 PO


   4/1/21 06:00


 4/2/21 16:48 DC  





 


 Levothyroxine


 Sodium


  (Synthroid)  25 mcg  DAILY@06


 PO


   4/3/21 06:00


    4/15/21 05:42





 


 Lidocaine


  (Lidoderm Patch)  1 patch  DAILY


 TD


   4/7/21 12:25


    4/14/21 08:35





 


 Magnesium Oxide


  (Mag-Ox)  400 mg  DAILY@1200


 PO


   4/3/21 12:00


    4/15/21 10:28





 


 Methylphenidate


 HCl


  (Ritalin)  5 mg  QAM


 PO


   4/12/21 09:00


 4/14/21 09:39 DC 4/14/21 08:33





 


 Methylphenidate


 HCl


  (Ritalin)  10 mg  QAM


 PO


   4/15/21 09:00


    4/15/21 10:14





 


 Montelukast Sodium


  (Singulair)  10 mg  DAILY@1800


 PO


   4/2/21 18:00


    4/14/21 17:09





 


 Multivitamins


  (Theragram-M)  1 tab  DAILY@1200


 PO


   4/2/21 12:00


 4/2/21 16:55 DC  





 


 Multivitamins


  (Theragram-M)  1 tab  DAILY@1200


 PO


   4/3/21 12:00


    4/15/21 10:27





 


 Non-Formulary


 Medication


  (** See Comment


 Field Below **)  REMOVE


 LIDODERM


 PATCH  DAILY@21


 XX


   4/7/21 21:00


    4/14/21 21:34





 


 Pantoprazole


 Sodium


  (Protonix)  40 mg  DAILY


 PO


   4/1/21 09:00


 4/2/21 16:46 DC  





 


 Pantoprazole


 Sodium


  (Protonix)  40 mg  DAILY


 PO


   4/3/21 09:00


    4/15/21 10:16





 


 Polyethylene


 Glycol


  (Miralax)  1 pkt  DAILY  PRN


 PO


 CONSTIPATION  3/31/21 16:50


 4/2/21 16:51 DC  





 


 Polyethylene


 Glycol


  (Miralax)  1 pkt  DAILY  PRN


 PO


 CONSTIPATION  4/2/21 16:55


    4/4/21 17:25





 


 Rosuvastatin


 Calcium


  (Crestor)  5 mg  DAILY@1800


 PO


   4/2/21 18:00


    4/14/21 17:08





 


 Senna


  (Senokot)  1 tab  QHS


 PO


   3/31/21 21:00


 4/2/21 16:47 DC  





 


 Senna


  (Senokot)  1 tab  QHS


 PO


   4/2/21 21:00


    4/14/21 21:33





 


 Trazodone HCl


  (Desyrel)  50 mg  QHS@1800


 PO


   4/2/21 18:00


    4/14/21 17:09




















TAMAR COTE MD         Apr 15, 2021 13:28

## 2021-04-16 VITALS — DIASTOLIC BLOOD PRESSURE: 71 MMHG | SYSTOLIC BLOOD PRESSURE: 162 MMHG

## 2021-04-16 VITALS — DIASTOLIC BLOOD PRESSURE: 71 MMHG | SYSTOLIC BLOOD PRESSURE: 138 MMHG

## 2021-04-16 VITALS — SYSTOLIC BLOOD PRESSURE: 133 MMHG | DIASTOLIC BLOOD PRESSURE: 60 MMHG

## 2021-04-16 LAB
BASOPHILS # BLD AUTO: 0.1 10^3/UL (ref 0–0.2)
BASOPHILS NFR BLD AUTO: 1 % (ref 0–1)
BUN SERPL-MCNC: 12 MG/DL (ref 7–18)
CALCIUM SERPL-MCNC: 8.9 MG/DL (ref 8.8–10.2)
CHLORIDE SERPL-SCNC: 105 MEQ/L (ref 98–107)
CO2 SERPL-SCNC: 28 MEQ/L (ref 21–32)
CREAT SERPL-MCNC: 0.62 MG/DL (ref 0.55–1.3)
EOSINOPHIL # BLD AUTO: 0.2 10^3/UL (ref 0–0.5)
EOSINOPHIL NFR BLD AUTO: 2.9 % (ref 0–3)
GFR SERPL CREATININE-BSD FRML MDRD: > 60 ML/MIN/{1.73_M2} (ref 32–?)
GLUCOSE SERPL-MCNC: 167 MG/DL (ref 70–100)
HCT VFR BLD AUTO: 29.4 % (ref 36–47)
HGB BLD-MCNC: 9.2 G/DL (ref 12–15.5)
LYMPHOCYTES # BLD AUTO: 1 10^3/UL (ref 1.5–5)
LYMPHOCYTES NFR BLD AUTO: 16.3 % (ref 24–44)
MCH RBC QN AUTO: 31.9 PG (ref 27–33)
MCHC RBC AUTO-ENTMCNC: 31.3 G/DL (ref 32–36.5)
MCV RBC AUTO: 102.1 FL (ref 80–96)
MONOCYTES # BLD AUTO: 0.4 10^3/UL (ref 0–0.8)
MONOCYTES NFR BLD AUTO: 5.9 % (ref 2–8)
NEUTROPHILS # BLD AUTO: 4.4 10^3/UL (ref 1.5–8.5)
NEUTROPHILS NFR BLD AUTO: 73.6 % (ref 36–66)
PLATELET # BLD AUTO: 358 10^3/UL (ref 150–450)
POTASSIUM SERPL-SCNC: 4.3 MEQ/L (ref 3.5–5.1)
RBC # BLD AUTO: 2.88 10^6/UL (ref 4–5.4)
SODIUM SERPL-SCNC: 138 MEQ/L (ref 136–145)
WBC # BLD AUTO: 5.9 10^3/UL (ref 4–10)

## 2021-04-16 RX ADMIN — INSULIN LISPRO SCH UNITS: 100 INJECTION, SOLUTION INTRAVENOUS; SUBCUTANEOUS at 07:30

## 2021-04-16 RX ADMIN — ROSUVASTATIN CALCIUM SCH MG: 10 TABLET, FILM COATED ORAL at 15:32

## 2021-04-16 RX ADMIN — WHITE PETROLATUM SCH DOSE: 57; 17 PASTE TOPICAL at 20:13

## 2021-04-16 RX ADMIN — PANTOPRAZOLE SODIUM SCH MG: 40 TABLET, DELAYED RELEASE ORAL at 15:32

## 2021-04-16 RX ADMIN — WHITE PETROLATUM SCH DOSE: 57; 17 PASTE TOPICAL at 16:00

## 2021-04-16 RX ADMIN — INSULIN LISPRO SCH UNITS: 100 INJECTION, SOLUTION INTRAVENOUS; SUBCUTANEOUS at 12:00

## 2021-04-16 RX ADMIN — DOCUSATE SODIUM SCH MG: 100 CAPSULE, LIQUID FILLED ORAL at 09:00

## 2021-04-16 RX ADMIN — ACETAMINOPHEN SCH MG: 500 TABLET ORAL at 20:17

## 2021-04-16 RX ADMIN — ACETAMINOPHEN SCH MG: 500 TABLET ORAL at 09:00

## 2021-04-16 RX ADMIN — SENNOSIDES SCH TAB: 8.6 TABLET, FILM COATED ORAL at 19:33

## 2021-04-16 RX ADMIN — INSULIN LISPRO SCH UNITS: 100 INJECTION, SOLUTION INTRAVENOUS; SUBCUTANEOUS at 17:30

## 2021-04-16 RX ADMIN — METHYLPHENIDATE HYDROCHLORIDE SCH MG: 5 TABLET ORAL at 15:33

## 2021-04-16 RX ADMIN — LEVOTHYROXINE SODIUM SCH MCG: 25 TABLET ORAL at 05:53

## 2021-04-16 RX ADMIN — MULTIPLE VITAMINS W/ MINERALS TAB SCH TAB: TAB at 12:00

## 2021-04-16 RX ADMIN — Medication SCH: at 20:13

## 2021-04-16 RX ADMIN — ACETAMINOPHEN SCH MG: 500 TABLET ORAL at 13:00

## 2021-04-16 RX ADMIN — ESCITALOPRAM OXALATE SCH MG: 10 TABLET, FILM COATED ORAL at 15:33

## 2021-04-16 RX ADMIN — MONTELUKAST SODIUM SCH MG: 10 TABLET, FILM COATED ORAL at 18:00

## 2021-04-16 RX ADMIN — DOCUSATE SODIUM SCH MG: 100 CAPSULE, LIQUID FILLED ORAL at 20:12

## 2021-04-16 RX ADMIN — INSULIN LISPRO SCH UNITS: 100 INJECTION, SOLUTION INTRAVENOUS; SUBCUTANEOUS at 20:13

## 2021-04-16 RX ADMIN — LABETALOL HCL SCH MG: 200 TABLET, FILM COATED ORAL at 15:30

## 2021-04-16 RX ADMIN — Medication SCH MG: at 12:00

## 2021-04-16 RX ADMIN — MAGNESIUM OXIDE SCH MG: 400 TABLET ORAL at 12:00

## 2021-04-16 RX ADMIN — ASPIRIN SCH MG: 81 TABLET ORAL at 15:30

## 2021-04-16 RX ADMIN — ACETAMINOPHEN SCH MG: 500 TABLET ORAL at 17:00

## 2021-04-16 RX ADMIN — WHITE PETROLATUM SCH DOSE: 57; 17 PASTE TOPICAL at 09:00

## 2021-04-16 RX ADMIN — LABETALOL HCL SCH MG: 200 TABLET, FILM COATED ORAL at 20:15

## 2021-04-16 RX ADMIN — FERROUS SULFATE TAB 325 MG (65 MG ELEMENTAL FE) SCH MG: 325 (65 FE) TAB at 09:00

## 2021-04-16 RX ADMIN — LIDOCAINE SCH PATCH: 50 PATCH CUTANEOUS at 09:00

## 2021-04-17 VITALS — DIASTOLIC BLOOD PRESSURE: 74 MMHG | SYSTOLIC BLOOD PRESSURE: 159 MMHG

## 2021-04-17 VITALS — DIASTOLIC BLOOD PRESSURE: 65 MMHG | SYSTOLIC BLOOD PRESSURE: 136 MMHG

## 2021-04-17 VITALS — DIASTOLIC BLOOD PRESSURE: 76 MMHG | SYSTOLIC BLOOD PRESSURE: 166 MMHG

## 2021-04-17 VITALS — DIASTOLIC BLOOD PRESSURE: 66 MMHG | SYSTOLIC BLOOD PRESSURE: 144 MMHG

## 2021-04-17 RX ADMIN — PANTOPRAZOLE SODIUM SCH MG: 40 TABLET, DELAYED RELEASE ORAL at 08:12

## 2021-04-17 RX ADMIN — INSULIN LISPRO SCH UNITS: 100 INJECTION, SOLUTION INTRAVENOUS; SUBCUTANEOUS at 19:59

## 2021-04-17 RX ADMIN — ACETAMINOPHEN SCH MG: 500 TABLET ORAL at 08:13

## 2021-04-17 RX ADMIN — INSULIN LISPRO SCH UNITS: 100 INJECTION, SOLUTION INTRAVENOUS; SUBCUTANEOUS at 08:11

## 2021-04-17 RX ADMIN — METHYLPHENIDATE HYDROCHLORIDE SCH MG: 5 TABLET ORAL at 08:12

## 2021-04-17 RX ADMIN — Medication SCH: at 19:59

## 2021-04-17 RX ADMIN — WHITE PETROLATUM SCH DOSE: 57; 17 PASTE TOPICAL at 19:51

## 2021-04-17 RX ADMIN — WHITE PETROLATUM SCH DOSE: 57; 17 PASTE TOPICAL at 16:55

## 2021-04-17 RX ADMIN — SENNOSIDES SCH TAB: 8.6 TABLET, FILM COATED ORAL at 19:59

## 2021-04-17 RX ADMIN — ESCITALOPRAM OXALATE SCH MG: 10 TABLET, FILM COATED ORAL at 17:16

## 2021-04-17 RX ADMIN — FERROUS SULFATE TAB 325 MG (65 MG ELEMENTAL FE) SCH MG: 325 (65 FE) TAB at 08:11

## 2021-04-17 RX ADMIN — WHITE PETROLATUM SCH DOSE: 57; 17 PASTE TOPICAL at 08:14

## 2021-04-17 RX ADMIN — ACETAMINOPHEN SCH MG: 500 TABLET ORAL at 12:17

## 2021-04-17 RX ADMIN — LABETALOL HCL SCH MG: 200 TABLET, FILM COATED ORAL at 20:01

## 2021-04-17 RX ADMIN — ASPIRIN SCH MG: 81 TABLET ORAL at 08:11

## 2021-04-17 RX ADMIN — MAGNESIUM OXIDE SCH MG: 400 TABLET ORAL at 12:16

## 2021-04-17 RX ADMIN — LABETALOL HCL SCH MG: 200 TABLET, FILM COATED ORAL at 08:12

## 2021-04-17 RX ADMIN — LEVOTHYROXINE SODIUM SCH MCG: 25 TABLET ORAL at 05:43

## 2021-04-17 RX ADMIN — ROSUVASTATIN CALCIUM SCH MG: 10 TABLET, FILM COATED ORAL at 17:16

## 2021-04-17 RX ADMIN — Medication SCH MG: at 12:16

## 2021-04-17 RX ADMIN — ACETAMINOPHEN SCH MG: 500 TABLET ORAL at 16:56

## 2021-04-17 RX ADMIN — DOCUSATE SODIUM SCH MG: 100 CAPSULE, LIQUID FILLED ORAL at 19:59

## 2021-04-17 RX ADMIN — DOCUSATE SODIUM SCH MG: 100 CAPSULE, LIQUID FILLED ORAL at 08:11

## 2021-04-17 RX ADMIN — INSULIN LISPRO SCH UNITS: 100 INJECTION, SOLUTION INTRAVENOUS; SUBCUTANEOUS at 12:17

## 2021-04-17 RX ADMIN — MONTELUKAST SODIUM SCH MG: 10 TABLET, FILM COATED ORAL at 17:16

## 2021-04-17 RX ADMIN — MULTIPLE VITAMINS W/ MINERALS TAB SCH TAB: TAB at 12:16

## 2021-04-17 RX ADMIN — INSULIN LISPRO SCH UNITS: 100 INJECTION, SOLUTION INTRAVENOUS; SUBCUTANEOUS at 16:57

## 2021-04-17 RX ADMIN — ACETAMINOPHEN SCH MG: 500 TABLET ORAL at 20:01

## 2021-04-17 RX ADMIN — LIDOCAINE SCH PATCH: 50 PATCH CUTANEOUS at 08:14

## 2021-04-18 VITALS — SYSTOLIC BLOOD PRESSURE: 158 MMHG | DIASTOLIC BLOOD PRESSURE: 72 MMHG

## 2021-04-18 VITALS — SYSTOLIC BLOOD PRESSURE: 150 MMHG | DIASTOLIC BLOOD PRESSURE: 68 MMHG

## 2021-04-18 VITALS — DIASTOLIC BLOOD PRESSURE: 78 MMHG | SYSTOLIC BLOOD PRESSURE: 162 MMHG

## 2021-04-18 RX ADMIN — ACETAMINOPHEN SCH MG: 500 TABLET ORAL at 16:11

## 2021-04-18 RX ADMIN — ACETAMINOPHEN SCH MG: 500 TABLET ORAL at 07:26

## 2021-04-18 RX ADMIN — ACETAMINOPHEN SCH MG: 500 TABLET ORAL at 20:12

## 2021-04-18 RX ADMIN — SENNOSIDES SCH TAB: 8.6 TABLET, FILM COATED ORAL at 20:12

## 2021-04-18 RX ADMIN — ROSUVASTATIN CALCIUM SCH MG: 10 TABLET, FILM COATED ORAL at 17:13

## 2021-04-18 RX ADMIN — METHYLPHENIDATE HYDROCHLORIDE SCH MG: 5 TABLET ORAL at 07:25

## 2021-04-18 RX ADMIN — MONTELUKAST SODIUM SCH MG: 10 TABLET, FILM COATED ORAL at 17:12

## 2021-04-18 RX ADMIN — WHITE PETROLATUM SCH DOSE: 57; 17 PASTE TOPICAL at 20:13

## 2021-04-18 RX ADMIN — LIDOCAINE SCH PATCH: 50 PATCH CUTANEOUS at 09:00

## 2021-04-18 RX ADMIN — INSULIN LISPRO SCH UNITS: 100 INJECTION, SOLUTION INTRAVENOUS; SUBCUTANEOUS at 11:57

## 2021-04-18 RX ADMIN — Medication SCH MG: at 11:34

## 2021-04-18 RX ADMIN — INSULIN LISPRO SCH UNITS: 100 INJECTION, SOLUTION INTRAVENOUS; SUBCUTANEOUS at 07:24

## 2021-04-18 RX ADMIN — ACETAMINOPHEN SCH MG: 500 TABLET ORAL at 11:34

## 2021-04-18 RX ADMIN — WHITE PETROLATUM SCH DOSE: 57; 17 PASTE TOPICAL at 07:28

## 2021-04-18 RX ADMIN — LABETALOL HCL SCH MG: 200 TABLET, FILM COATED ORAL at 20:12

## 2021-04-18 RX ADMIN — LIDOCAINE SCH PATCH: 50 PATCH CUTANEOUS at 07:27

## 2021-04-18 RX ADMIN — LABETALOL HCL SCH MG: 200 TABLET, FILM COATED ORAL at 07:27

## 2021-04-18 RX ADMIN — Medication SCH: at 17:16

## 2021-04-18 RX ADMIN — INSULIN LISPRO SCH UNITS: 100 INJECTION, SOLUTION INTRAVENOUS; SUBCUTANEOUS at 17:12

## 2021-04-18 RX ADMIN — ASPIRIN SCH MG: 81 TABLET ORAL at 07:25

## 2021-04-18 RX ADMIN — DOCUSATE SODIUM SCH MG: 100 CAPSULE, LIQUID FILLED ORAL at 20:12

## 2021-04-18 RX ADMIN — INSULIN LISPRO SCH UNITS: 100 INJECTION, SOLUTION INTRAVENOUS; SUBCUTANEOUS at 20:12

## 2021-04-18 RX ADMIN — DOCUSATE SODIUM SCH MG: 100 CAPSULE, LIQUID FILLED ORAL at 07:25

## 2021-04-18 RX ADMIN — FERROUS SULFATE TAB 325 MG (65 MG ELEMENTAL FE) SCH MG: 325 (65 FE) TAB at 07:25

## 2021-04-18 RX ADMIN — LEVOTHYROXINE SODIUM SCH MCG: 25 TABLET ORAL at 06:10

## 2021-04-18 RX ADMIN — MULTIPLE VITAMINS W/ MINERALS TAB SCH TAB: TAB at 11:34

## 2021-04-18 RX ADMIN — WHITE PETROLATUM SCH DOSE: 57; 17 PASTE TOPICAL at 16:00

## 2021-04-18 RX ADMIN — MAGNESIUM OXIDE SCH MG: 400 TABLET ORAL at 11:33

## 2021-04-18 RX ADMIN — ESCITALOPRAM OXALATE SCH MG: 10 TABLET, FILM COATED ORAL at 17:13

## 2021-04-18 RX ADMIN — PANTOPRAZOLE SODIUM SCH MG: 40 TABLET, DELAYED RELEASE ORAL at 07:27

## 2021-04-19 VITALS — SYSTOLIC BLOOD PRESSURE: 168 MMHG | DIASTOLIC BLOOD PRESSURE: 72 MMHG

## 2021-04-19 RX ADMIN — WHITE PETROLATUM SCH DOSE: 57; 17 PASTE TOPICAL at 09:00

## 2021-04-19 RX ADMIN — LIDOCAINE SCH PATCH: 50 PATCH CUTANEOUS at 09:00

## 2021-04-19 RX ADMIN — PANTOPRAZOLE SODIUM SCH MG: 40 TABLET, DELAYED RELEASE ORAL at 10:05

## 2021-04-19 RX ADMIN — DOCUSATE SODIUM SCH MG: 100 CAPSULE, LIQUID FILLED ORAL at 09:00

## 2021-04-19 RX ADMIN — LEVOTHYROXINE SODIUM SCH MCG: 25 TABLET ORAL at 06:10

## 2021-04-19 RX ADMIN — LABETALOL HCL SCH MG: 200 TABLET, FILM COATED ORAL at 10:04

## 2021-04-19 RX ADMIN — METHYLPHENIDATE HYDROCHLORIDE SCH MG: 5 TABLET ORAL at 10:05

## 2021-04-19 RX ADMIN — ACETAMINOPHEN SCH MG: 500 TABLET ORAL at 10:04

## 2021-04-19 RX ADMIN — INSULIN LISPRO SCH UNITS: 100 INJECTION, SOLUTION INTRAVENOUS; SUBCUTANEOUS at 07:30

## 2021-04-19 RX ADMIN — FERROUS SULFATE TAB 325 MG (65 MG ELEMENTAL FE) SCH MG: 325 (65 FE) TAB at 10:05

## 2021-04-19 RX ADMIN — ASPIRIN SCH MG: 81 TABLET ORAL at 10:04

## 2021-04-19 RX ADMIN — ACETAMINOPHEN SCH MG: 500 TABLET ORAL at 00:11

## 2021-04-19 NOTE — IPNPDOC
Text Note


Date of Service


The patient was seen on 4/19/21.





NOTE


Patient was seen and examined. No overnight issues.





Physical Exam.


General: Cooperative, No Acute Distress


Eye: PERRLA, Conjunctiva & lids normal, EOMI, anicteric sclerae


Neck: Supple, no JVD, no thyromegaly


Chest: Clear to auscultation, Normal air movement, no crackles or wheezing, 

breathing comfortably on room air


Heart: Rate Normal, Regular Rhythm, Normal S1, Normal S2, no m/r/g


Abdomen: Normal bowel sounds, soft, NTND


Extremities: Tender right hip with movement, dressing in place, otherwise 

without peripheral edema and warm well perfused extremities


Psych: labile mood, wants to go home





Assessment





85 yr old W with a hx of CAD s/p CABG in 2004, DM2, hypothyroidism, HTN, gout, 

HLD, SUNG, left proximal femur Fracture in 2016, Vertebroplasty and multiple 

other surgeries had a mechanical fall with R hip fracture now s/p R hip 

hemiarthroplasty on 3/28. 





1) Mechanical fall resulting in right femur fx s/p R hip hemiarthroplasty on 

3/28, pain control with scheduled acetaminophen 1gQ6H and DVT ppx with ASA 81 

per ortho recs





2) Hypertension. labetelol at 100mg BID, amlodipine increased to 10mg QD. 





3) Hx of MDD : continue Escitalopram, consulted psych for depression, flat 

affect, refusal of care, basically encouraged us to encourage her and if 

persistent to trial low dose stimulant per psych note.





4) Gout: continue Allopurinol.





5) Dyslipidemia / Coronary artery disease /CABG. continue Rosuvastatin and 

aspirin .





6) Diabetes mellitus type 2: Hypoglycemia protocol . Sliding scale insulin 

AC/HS. FSBG AC/HS. continue home metformin





7) Hypothyroidism: continue Levothyroxine.





DVT prophylaxis: daily ASA 81 per orthopedics rec.





Dispo: As per PM&R





VS,Fishbone, I+O


VS, Fishbone, I+O





Vital Signs








  Date Time  Temp Pulse Resp B/P (MAP) Pulse Ox O2 Delivery O2 Flow Rate FiO2


 


4/19/21 10:05  64  168/72    


 


4/19/21 05:34 97.2  18  98 Room Air  














I&O- Last 24 Hours up to 6 AM 


 


 4/19/21





 06:00


 


Intake Total 540 ml


 


Balance 540 ml

















KAYLEE RODRIGES MD             Apr 19, 2021 11:49

## 2021-04-26 ENCOUNTER — HOSPITAL ENCOUNTER (INPATIENT)
Dept: HOSPITAL 53 - M ED | Age: 86
LOS: 3 days | Discharge: NURSING, REHAB OR LONG-TERM CARE | DRG: 312 | End: 2021-04-29
Attending: INTERNAL MEDICINE | Admitting: INTERNAL MEDICINE
Payer: MEDICARE

## 2021-04-26 VITALS — DIASTOLIC BLOOD PRESSURE: 60 MMHG | SYSTOLIC BLOOD PRESSURE: 132 MMHG

## 2021-04-26 VITALS — BODY MASS INDEX: 25.58 KG/M2 | HEIGHT: 60 IN | WEIGHT: 130.29 LBS

## 2021-04-26 VITALS — SYSTOLIC BLOOD PRESSURE: 124 MMHG | DIASTOLIC BLOOD PRESSURE: 52 MMHG

## 2021-04-26 VITALS — DIASTOLIC BLOOD PRESSURE: 97 MMHG | SYSTOLIC BLOOD PRESSURE: 141 MMHG

## 2021-04-26 DIAGNOSIS — Z79.899: ICD-10-CM

## 2021-04-26 DIAGNOSIS — Z88.8: ICD-10-CM

## 2021-04-26 DIAGNOSIS — R55: Primary | ICD-10-CM

## 2021-04-26 DIAGNOSIS — E78.5: ICD-10-CM

## 2021-04-26 DIAGNOSIS — E03.9: ICD-10-CM

## 2021-04-26 DIAGNOSIS — Z79.82: ICD-10-CM

## 2021-04-26 DIAGNOSIS — I10: ICD-10-CM

## 2021-04-26 DIAGNOSIS — D50.9: ICD-10-CM

## 2021-04-26 DIAGNOSIS — Z96.641: ICD-10-CM

## 2021-04-26 DIAGNOSIS — F32.9: ICD-10-CM

## 2021-04-26 DIAGNOSIS — I25.10: ICD-10-CM

## 2021-04-26 DIAGNOSIS — M10.9: ICD-10-CM

## 2021-04-26 DIAGNOSIS — E11.9: ICD-10-CM

## 2021-04-26 LAB
ALBUMIN SERPL BCG-MCNC: 3.2 GM/DL (ref 3.2–5.2)
ALT SERPL W P-5'-P-CCNC: 16 U/L (ref 12–78)
APTT BLD: 26.4 SECONDS (ref 24.2–38.5)
BASOPHILS # BLD AUTO: 0.1 10^3/UL (ref 0–0.2)
BASOPHILS NFR BLD AUTO: 1 % (ref 0–1)
BILIRUB CONJ SERPL-MCNC: 0.1 MG/DL (ref 0–0.2)
BILIRUB SERPL-MCNC: 0.3 MG/DL (ref 0.2–1)
BUN SERPL-MCNC: 21 MG/DL (ref 7–18)
CALCIUM SERPL-MCNC: 9.5 MG/DL (ref 8.8–10.2)
CHLORIDE SERPL-SCNC: 102 MEQ/L (ref 98–107)
CK MB CFR.DF SERPL CALC: 2.27
CK MB SERPL-MCNC: < 1 NG/ML (ref ?–3.6)
CK SERPL-CCNC: 44 U/L (ref 26–192)
CO2 SERPL-SCNC: 28 MEQ/L (ref 21–32)
CREAT SERPL-MCNC: 0.81 MG/DL (ref 0.55–1.3)
EOSINOPHIL # BLD AUTO: 0.1 10^3/UL (ref 0–0.5)
EOSINOPHIL NFR BLD AUTO: 0.7 % (ref 0–3)
GFR SERPL CREATININE-BSD FRML MDRD: > 60 ML/MIN/{1.73_M2} (ref 32–?)
GLUCOSE SERPL-MCNC: 166 MG/DL (ref 70–100)
HCT VFR BLD AUTO: 32.7 % (ref 36–47)
HGB BLD-MCNC: 10.2 G/DL (ref 12–15.5)
INR PPP: 1.13
LIPASE SERPL-CCNC: 179 U/L (ref 73–393)
LYMPHOCYTES # BLD AUTO: 1 10^3/UL (ref 1.5–5)
LYMPHOCYTES NFR BLD AUTO: 15.1 % (ref 24–44)
MAGNESIUM SERPL-MCNC: 1.9 MG/DL (ref 1.8–2.4)
MCH RBC QN AUTO: 31.5 PG (ref 27–33)
MCHC RBC AUTO-ENTMCNC: 31.2 G/DL (ref 32–36.5)
MCV RBC AUTO: 100.9 FL (ref 80–96)
MONOCYTES # BLD AUTO: 0.5 10^3/UL (ref 0–0.8)
MONOCYTES NFR BLD AUTO: 7.7 % (ref 2–8)
NEUTROPHILS # BLD AUTO: 5.2 10^3/UL (ref 1.5–8.5)
NEUTROPHILS NFR BLD AUTO: 75.1 % (ref 36–66)
NT-PRO BNP: 799 PG/ML (ref ?–450)
PLATELET # BLD AUTO: 319 10^3/UL (ref 150–450)
POTASSIUM SERPL-SCNC: 4.4 MEQ/L (ref 3.5–5.1)
PROT SERPL-MCNC: 5.9 GM/DL (ref 6.4–8.2)
PROTHROMBIN TIME: 14.7 SECONDS (ref 12.5–14.3)
RBC # BLD AUTO: 3.24 10^6/UL (ref 4–5.4)
RSV RNA NPH QL NAA+PROBE: NEGATIVE
SODIUM SERPL-SCNC: 136 MEQ/L (ref 136–145)
T4 FREE SERPL-MCNC: 1.04 NG/DL (ref 0.76–1.46)
TROPONIN I SERPL-MCNC: < 0.02 NG/ML (ref ?–0.1)
TSH SERPL DL<=0.005 MIU/L-ACNC: 4.46 UIU/ML (ref 0.36–3.74)
WBC # BLD AUTO: 6.9 10^3/UL (ref 4–10)

## 2021-04-26 RX ADMIN — PANTOPRAZOLE SODIUM SCH MG: 40 TABLET, DELAYED RELEASE ORAL at 16:39

## 2021-04-26 RX ADMIN — MAGNESIUM OXIDE SCH MG: 400 TABLET ORAL at 16:40

## 2021-04-26 RX ADMIN — ESCITALOPRAM OXALATE SCH MG: 10 TABLET, FILM COATED ORAL at 21:25

## 2021-04-26 RX ADMIN — ASPIRIN SCH MG: 81 TABLET ORAL at 16:39

## 2021-04-26 RX ADMIN — OXYCODONE HYDROCHLORIDE AND ACETAMINOPHEN SCH MG: 500 TABLET ORAL at 16:39

## 2021-04-26 RX ADMIN — DOCUSATE SODIUM SCH MG: 100 CAPSULE, LIQUID FILLED ORAL at 16:40

## 2021-04-26 RX ADMIN — FERROUS SULFATE TAB 325 MG (65 MG ELEMENTAL FE) SCH MG: 325 (65 FE) TAB at 16:40

## 2021-04-26 RX ADMIN — MONTELUKAST SODIUM SCH MG: 10 TABLET, FILM COATED ORAL at 21:25

## 2021-04-26 NOTE — REP
INDICATION:

seizure, unresponsive episode.



COMPARISON:

Comparison study May 4, 2011..



TECHNIQUE:

Helical scanning is acquired. 5 mm axial images were reformatted.  Coronal MPR images

were generated.



FINDINGS:

Bone window settings demonstrate an intact bony calvarium.  There is no evidence of

skull fracture or incidental bony calvarial lesion.  The visualized paranasal sinuses

appear clear.  No intraorbital abnormality is seen.  On soft tissue window setting

images; the lateral, third, and fourth ventricles are normal in size and position.

Gray-white differentiation pattern is normal above and below the tentorium.  There are

is no evidence of intracranial hemorrhage.  No mass, edema, infarction, or midline

shift is seen.  No extra-axial fluid collection is appreciated.



There is generalized volume loss.  Heavy vascular calcifications noted in the distal

vertebral and carotid arteries.  There is a small amount of fluid in the left side of

the sphenoid sinus.



IMPRESSION:

Generalized volume loss and extensive vascular calcification.  No acute intracranial

abnormality..





<Electronically signed by Edinson Morales > 04/26/21 1017

## 2021-04-26 NOTE — ECGEPIP
[facility] - ED

                                       

                                       Test Date:    2021

Pat Name:     NANDO HUERTA          Department:   

Patient ID:   [id number]                 Room:         -

Gender:       Female                   Technician:   [email or web address]

:          1935               Requested By: Maja Lundborg-Gray 

Order Number: [id number]     Reading MD:   Aldo To

                                 Measurements

Intervals                              Axis          

Rate:         58                       P:            71

LA:           198                      QRS:          -4

QRSD:         84                       T:            -6

QT:           472                                    

QTc:          463                                    

                           Interpretive Statements

Sinus bradycardia with first degree AV block

POOR R WAVE PROGRESSION

NONSPECIFIC T WAVE ABNORMALITY(S)

SIMILAR TO 3/3/18

Electronically Signed on 2021 20:11:16 EDT by Aldo To

## 2021-04-27 VITALS — SYSTOLIC BLOOD PRESSURE: 146 MMHG | DIASTOLIC BLOOD PRESSURE: 58 MMHG

## 2021-04-27 VITALS — DIASTOLIC BLOOD PRESSURE: 64 MMHG | SYSTOLIC BLOOD PRESSURE: 140 MMHG

## 2021-04-27 VITALS — DIASTOLIC BLOOD PRESSURE: 62 MMHG | SYSTOLIC BLOOD PRESSURE: 128 MMHG

## 2021-04-27 VITALS — SYSTOLIC BLOOD PRESSURE: 128 MMHG | DIASTOLIC BLOOD PRESSURE: 56 MMHG

## 2021-04-27 LAB
BUN SERPL-MCNC: 17 MG/DL (ref 7–18)
CALCIUM SERPL-MCNC: 8.7 MG/DL (ref 8.8–10.2)
CHLORIDE SERPL-SCNC: 103 MEQ/L (ref 98–107)
CO2 SERPL-SCNC: 28 MEQ/L (ref 21–32)
CREAT SERPL-MCNC: 0.58 MG/DL (ref 0.55–1.3)
GFR SERPL CREATININE-BSD FRML MDRD: > 60 ML/MIN/{1.73_M2} (ref 32–?)
GLUCOSE SERPL-MCNC: 104 MG/DL (ref 70–100)
HCT VFR BLD AUTO: 29.5 % (ref 36–47)
HGB BLD-MCNC: 9.5 G/DL (ref 12–15.5)
MCH RBC QN AUTO: 32 PG (ref 27–33)
MCHC RBC AUTO-ENTMCNC: 32.2 G/DL (ref 32–36.5)
MCV RBC AUTO: 99.3 FL (ref 80–96)
PLATELET # BLD AUTO: 287 10^3/UL (ref 150–450)
POTASSIUM SERPL-SCNC: 4 MEQ/L (ref 3.5–5.1)
RBC # BLD AUTO: 2.97 10^6/UL (ref 4–5.4)
SODIUM SERPL-SCNC: 137 MEQ/L (ref 136–145)
WBC # BLD AUTO: 4.9 10^3/UL (ref 4–10)

## 2021-04-27 RX ADMIN — MAGNESIUM OXIDE SCH MG: 400 TABLET ORAL at 11:29

## 2021-04-27 RX ADMIN — FERROUS SULFATE TAB 325 MG (65 MG ELEMENTAL FE) SCH MG: 325 (65 FE) TAB at 11:29

## 2021-04-27 RX ADMIN — ENOXAPARIN SODIUM SCH MG: 40 INJECTION SUBCUTANEOUS at 11:31

## 2021-04-27 RX ADMIN — LEVOTHYROXINE SODIUM SCH MCG: 25 TABLET ORAL at 05:41

## 2021-04-27 RX ADMIN — OXYCODONE HYDROCHLORIDE AND ACETAMINOPHEN SCH MG: 500 TABLET ORAL at 11:31

## 2021-04-27 RX ADMIN — MONTELUKAST SODIUM SCH MG: 10 TABLET, FILM COATED ORAL at 21:00

## 2021-04-27 RX ADMIN — ASPIRIN SCH MG: 81 TABLET ORAL at 11:29

## 2021-04-27 RX ADMIN — ESCITALOPRAM OXALATE SCH MG: 10 TABLET, FILM COATED ORAL at 21:00

## 2021-04-27 RX ADMIN — DOCUSATE SODIUM SCH MG: 100 CAPSULE, LIQUID FILLED ORAL at 11:29

## 2021-04-27 RX ADMIN — PANTOPRAZOLE SODIUM SCH MG: 40 TABLET, DELAYED RELEASE ORAL at 11:29

## 2021-04-27 NOTE — REP
INDICATION:

syncope



COMPARISON:

None.



TECHNIQUE:

Real-time ultrasound evaluation and duplex Doppler interrogation of the extracranial

carotid vasculature is performed.



FINDINGS:

There is mild to moderate plaquing and narrowing in both carotid bulbs extending into

the internal and external carotid arteries.  Luminal narrowing on the right is less

than 50%.  There is mild elevation of the peak systolic velocity in the left internal

carotid artery which could indicate mild stenosis 50-69%.  The vertebral arteries

demonstrate normal direction of flow.



RIGHT                       LEFT

Peak systolic velocity ICA                   111.1 cm/s                     138.8 cm/s

End diastolic velocity ICA                   27.4 cm/s                     32.8 cm/s

Peak systolic velocity CCA                  59.2 cm/s                     81.8cm/s

Peak systolic velocity ECA                  69.6 cm/s                     116.9 cm/s

ICA/CCA ratio                                       1.87 1.69



IMPRESSION:

Luminal narrowing right internal carotid artery less than 50%.  Possible stenosis left

internal carotid artery 50-69%.





<Electronically signed by Grant Beard > 04/27/21 [id number]

## 2021-04-27 NOTE — IPNPDOC
Subjective


Date Seen


The patient was seen on 4/27/21.





Subjective


Chief Complaint/HPI


Patient has been refusing the carotid US. This morning she again refused it. She

said that she was too tired and wanted to sleep. She did not get any sleep last 

night.





Objective


Physical Examination


General Exam:  Positive: Alert, Cooperative, No Acute Distress


Eye Exam:  Positive: PERRLA, Conjunctiva & lids normal, EOMI; 


   Negative: Sclera icteric


Neck Exam:  Positive: Supple; 


   Negative: JVD, thyromegaly


Chest Exam:  Positive: Clear to auscultation, Normal air movement


Heart Exam:  Positive: Rate Normal, Regular Rhythm, Normal S1, Normal S2, 

Murmurs (soft systolic murmur); 


   Negative: Rubs


Telemetry:  Positive: No significant arrhythmia


Abdomen Exam:  Positive: Normal bowel sounds, Soft; 


   Negative: Tenderness, Hepatospenomegaly


Extremity Exam:  Positive: Edema (trace), Normal pulses; 


   Negative: Clubbing, Cyanosis





Assessment /Plan


Assessment


85-year-old female with PMH of HTN, gout, DLP, CAD/ CABG 2004, DM2, 

hypothyroidism, Hx of MDD was hospitalized in March 2017 / hx of electroshock 

therapy, Iron deficiency anemia, left proximal femur Fracture in 2016, Right 

femur Neck fracture in March 2021, Vertebroplasty , Mixed incontinence was in 

ARU from April 2 to April 19 after her right hip fracture and right 

hemiarthroplasty done on March 28 was at home doing okay until this morning when

she suffered a syncopal episode while eating her breakfast. Her daughter who 

witnessed the episode reported that she was sitting at the table having 

breakfast when suddenly her both her hands started shaking and she slumped 

forward. She was very pale was nonresponsive and she thought that the patient 

had briefly stopped breathing. She immediately called 911 and was instructed to 

check her mouth for any food aspiration after she checked that she lowered the 

pt to the ground as per 911's instructions. Patient soon  regained her 

consciousness and was able to talk and respond appropriately. She felt the whole

episode lasted about 5-10 minute.  . There was no confusion after the episode 

and there was no stool or urine incontinence. Daughter did not notice any 

seizure-like activity . By the time EMS reached the scene, patient was back to 

her usual self. The patient is being admitted for evaluation for syncope.





Syncope


Likely vasovagal syncope


no orthostatic hypotension noted. 


No events on telemetry. 


Could also be medication related. She has a multiple blood antihypertensives and

antidepressant medications


CT head negative


Will get carotid ultrasound and echocardiogram


EKG sinus rhythm, with bradycardia stopped labetalol





Hypertension


Blood pressure in normal range at present


Will also stop amlodipine and labetelol


Will only continue lisinopril 5 mg at bedtime





Hx of MDD 


Escitalopram, aripiprazole





Gout


Allopurinol





Dyslipidemia / Coronary artery disease /CABG


Rosuvastatin


Labetalol stopped as patient was bradycardic in ED





Diabetes mellitus type 2


A1c 6.1


on metformin at home 


Will continue on discharge





Hypothyroidism


Levothyroxine





Plan/VTE


VTE Prophylaxis Ordered?:  Yes





VS, I&O, 24H, Fishbone


Vital Signs/I&O





Vital Signs








  Date Time  Temp Pulse Resp B/P (MAP) Pulse Ox O2 Delivery O2 Flow Rate FiO2


 


4/27/21 06:23  82  128/62 (84)    





  64  124/62 (82)    





    146/58 (87)    


 


4/27/21 06:00 98.5  17  98 Room Air  














I&O- Last 24 Hours up to 6 AM 


 


 4/27/21





 06:00


 


Intake Total 860 ml


 


Output Total 900 ml


 


Balance -40 ml











Laboratory Data


24H LABS


Laboratory Tests 2


4/26/21 09:55: 


Immature Granulocyte % (Auto) 0.4, Neutrophils (%) (Auto) 75.1H, Lymphocytes (%)

(Auto) 15.1L, Monocytes (%) (Auto) 7.7, Eosinophils (%) (Auto) 0.7, Basophils 

(%) (Auto) 1.0, Neutrophils # (Auto) 5.2, Lymphocytes # (Auto) 1.0L, Monocytes #

(Auto) 0.5, Eosinophils # (Auto) 0.1, Basophils # (Auto) 0.1, Nucleated Red 

Blood Cells % (auto) 0.0, Prothrombin Time 14.7H, Prothromb Time International 

Ratio 1.13, Activated Partial Thromboplast Time 26.4, Anion Gap 6L, Glomerular 

Filtration Rate > 60.0, Calcium Level 9.5, Magnesium Level 1.9, Total Bilirubin 

0.3, Direct Bilirubin 0.1, Aspartate Amino Transf (AST/SGOT) 14, Alanine 

Aminotransferase (ALT/SGPT) 16, Alkaline Phosphatase 128H, Total Creatine Kinase

44, Creatine Kinase MB < 1.0, Creatine Kinase MB Relative Index 2.27, Troponin I

< 0.02, NT-Pro-B-Type Natriuretic Peptide 799H, Total Protein 5.9L, Albumin 3.2,

Albumin/Globulin Ratio 1.2, Lipase 179, Thyroid Stimulating Hormone (TSH) 

4.460H, Free Thyroxine 1.04


4/26/21 10:22: Bedside Glucose (Misc Panel) 154H


4/26/21 11:19: 


Coronavirus (COVID-19)(PCR) NEGATIVE, Influenza Type A (RT-PCR) NEGATIVE, 

Influenza Type B (RT-PCR) NEGATIVE, Respiratory Syncytial Virus (PCR) NEGATIVE


4/27/21 06:37: 


Nucleated Red Blood Cells % (auto) 0.0, Anion Gap 6L, Glomerular Filtration Rate

> 60.0, Calcium Level 8.7L


CBC/BMP


Laboratory Tests


4/26/21 09:55








4/27/21 06:37

















LESLEY MANNING MD                   Apr 27, 2021 09:15

## 2021-04-28 VITALS — DIASTOLIC BLOOD PRESSURE: 71 MMHG | SYSTOLIC BLOOD PRESSURE: 160 MMHG

## 2021-04-28 VITALS — SYSTOLIC BLOOD PRESSURE: 154 MMHG | DIASTOLIC BLOOD PRESSURE: 70 MMHG

## 2021-04-28 VITALS — DIASTOLIC BLOOD PRESSURE: 82 MMHG | SYSTOLIC BLOOD PRESSURE: 138 MMHG

## 2021-04-28 RX ADMIN — OXYCODONE HYDROCHLORIDE AND ACETAMINOPHEN SCH MG: 500 TABLET ORAL at 08:03

## 2021-04-28 RX ADMIN — ENOXAPARIN SODIUM SCH MG: 40 INJECTION SUBCUTANEOUS at 08:03

## 2021-04-28 RX ADMIN — MAGNESIUM OXIDE SCH MG: 400 TABLET ORAL at 08:04

## 2021-04-28 RX ADMIN — PANTOPRAZOLE SODIUM SCH MG: 40 TABLET, DELAYED RELEASE ORAL at 08:03

## 2021-04-28 RX ADMIN — ESCITALOPRAM OXALATE SCH MG: 10 TABLET, FILM COATED ORAL at 20:22

## 2021-04-28 RX ADMIN — MONTELUKAST SODIUM SCH MG: 10 TABLET, FILM COATED ORAL at 20:21

## 2021-04-28 RX ADMIN — LEVOTHYROXINE SODIUM SCH MCG: 25 TABLET ORAL at 05:35

## 2021-04-28 RX ADMIN — ASPIRIN SCH MG: 81 TABLET ORAL at 08:03

## 2021-04-28 RX ADMIN — DOCUSATE SODIUM SCH MG: 100 CAPSULE, LIQUID FILLED ORAL at 08:03

## 2021-04-28 RX ADMIN — FERROUS SULFATE TAB 325 MG (65 MG ELEMENTAL FE) SCH MG: 325 (65 FE) TAB at 08:03

## 2021-04-28 NOTE — IPNPDOC
Subjective


Date Seen


The patient was seen on 4/28/21.





Subjective


Chief Complaint/HPI


No issues overnight. will be going home.





Objective


Physical Examination


General Exam:  Positive: Alert, Cooperative, No Acute Distress


Eye Exam:  Positive: PERRLA, Conjunctiva & lids normal, EOMI; 


   Negative: Sclera icteric


Neck Exam:  Positive: Supple; 


   Negative: JVD, thyromegaly


Chest Exam:  Positive: Clear to auscultation, Normal air movement


Heart Exam:  Positive: Rate Normal, Regular Rhythm, Normal S1, Normal S2, 

Murmurs (soft systolic murmur); 


   Negative: Rubs


Telemetry:  Positive: No significant arrhythmia


Abdomen Exam:  Positive: Normal bowel sounds, Soft; 


   Negative: Tenderness, Hepatospenomegaly


Extremity Exam:  Negative: Clubbing, Cyanosis, Edema





Assessment /Plan


Assessment


85-year-old female with PMH of HTN, gout, DLP, CAD/ CABG 2004, DM2, hypoth

yroidism, Hx of MDD was hospitalized in March 2017 / hx of electroshock therapy,

Iron deficiency anemia, left proximal femur Fracture in 2016, Right femur Neck 

fracture in March 2021, Vertebroplasty , Mixed incontinence was in ARU from 

April 2 to April 19 after her right hip fracture and right hemiarthroplasty done

on March 28 was at home doing okay until this morning when she suffered a 

syncopal episode while eating her breakfast. Her daughter who witnessed the 

episode reported that she was sitting at the table having breakfast when 

suddenly her both her hands started shaking and she slumped forward. She was 

very pale was nonresponsive and she thought that the patient had briefly stopped

breathing. She immediately called 911 and was instructed to check her mouth for 

any food aspiration after she checked that she lowered the pt to the ground as 

per 911's instructions. Patient soon  regained her consciousness and was able to

talk and respond appropriately. She felt the whole episode lasted about 5-10 

minute.  . There was no confusion after the episode and there was no stool or 

urine incontinence. Daughter did not notice any seizure-like activity . By the 

time EMS reached the scene, patient was back to her usual self. The patient is 

being admitted for evaluation for syncope.





Syncope


vasovagal syncope vs medication related. 


no orthostatic hypotension noted. 


No events on telemetry. 


She has a multiple antihypertensives and antidepressant medications


CT head negative


Will get carotid ultrasound no severe blockages present. Referred to Dr Kirby


echocardiogram has been done


EKG sinus rhythm, with bradycardia stopped labetalol





Hypertension


Blood pressure in normal range at present


Will also stop amlodipine and labetelol


Will only continue lisinopril 5 mg at bedtime





Hx of MDD 


Escitalopram, aripiprazole





Gout


Allopurinol





Dyslipidemia / Coronary artery disease /CABG


Rosuvastatin


Labetalol stopped as patient was bradycardic in ED





Diabetes mellitus type 2


A1c 6.1


on metformin at home 


Will continue on discharge





Hypothyroidism


Levothyroxine





Dispo: Home, Follow up PMD in 1 week.





Plan/VTE


VTE Prophylaxis Ordered?:  Yes





VS, I&O, 24H, Fishbone


Vital Signs/I&O





Vital Signs








  Date Time  Temp Pulse Resp B/P (MAP) Pulse Ox O2 Delivery O2 Flow Rate FiO2


 


4/28/21 06:00 97.4 70 17 138/82 (100) 98 Room Air  














I&O- Last 24 Hours up to 6 AM 


 


 4/28/21





 06:00


 


Intake Total 610 ml


 


Output Total 1400 ml


 


Balance -790 ml

















LESLEY MANNING MD                   Apr 28, 2021 09:38

## 2021-04-29 VITALS — DIASTOLIC BLOOD PRESSURE: 60 MMHG | SYSTOLIC BLOOD PRESSURE: 139 MMHG

## 2021-04-29 RX ADMIN — LEVOTHYROXINE SODIUM SCH MCG: 25 TABLET ORAL at 05:35

## 2021-04-29 RX ADMIN — OXYCODONE HYDROCHLORIDE AND ACETAMINOPHEN SCH MG: 500 TABLET ORAL at 08:30

## 2021-04-29 RX ADMIN — DOCUSATE SODIUM SCH MG: 100 CAPSULE, LIQUID FILLED ORAL at 08:29

## 2021-04-29 RX ADMIN — ENOXAPARIN SODIUM SCH MG: 40 INJECTION SUBCUTANEOUS at 08:30

## 2021-04-29 RX ADMIN — ASPIRIN SCH MG: 81 TABLET ORAL at 08:30

## 2021-04-29 RX ADMIN — MAGNESIUM OXIDE SCH MG: 400 TABLET ORAL at 08:30

## 2021-04-29 RX ADMIN — FERROUS SULFATE TAB 325 MG (65 MG ELEMENTAL FE) SCH MG: 325 (65 FE) TAB at 08:29

## 2021-04-29 RX ADMIN — PANTOPRAZOLE SODIUM SCH MG: 40 TABLET, DELAYED RELEASE ORAL at 08:29

## 2021-04-29 NOTE — DS.PDOC
Discharge Summary


General


Date of Admission


Apr 28, 2021 at 10:44


Date of Discharge


4/29/21





Discharge Summary


PROCEDURES PERFORMED DURING STAY: 





ECHO:


MEASUREMENTS: 


2D Measurements:   


      Aortic root 3.0 cm


    Left atrium 3.6 cm


      Left ventricle diastole 3.5 cm 


      Intraventricular septum 1.11 cm


      Posterior wall 1.10 cm


      Inferior vena cava 1.6 cm (more than 50% respiratory variation)


      


Doppler Measurements:


      No aortic stenosis 


      No aortic regurgitation 


      Aortic valve velocity 148 cm/s


      LVOT velocity 127 cm/s


      LVOT VTI 26.9 cm 


      No mitral regurgitation 


      No mitral stenosis 


      Mitral E velocity 81.6 cm/s 


      Mitral A velocity 106 cm/s 


      Mitral deceleration time 243 msec 


      No tricuspid valve regurgitation


      No pulmonic regurgitation


      Pulmonary artery acceleration time 151 msec 





MITRAL ANNULAR TISSUE DOPPLER 


     E prime septal 5.3 cm/s, E prime lateral 7.5 cm/s 


 


DESCRIPTION: Rhythm was sinus. This was a moderately technically difficult 

echocardiogram. This was a 2D, M-mode, color flow Doppler, and pulsed wave 

Doppler examination including mitral annular tissue Doppler. 





CONCLUSIONS:


1.  Normal left ventricle internal dimensions and wall thickness. Normal 

regional LV wall motion and wall thickening. Hyperdynamic LV systolic function. 

LVEF 70% to 75% by visual assessment. Grade 1 LV diastolic dysfunction 

(impairedrelaxation filling pattern


2.  Normal right ventricle size and hyperdynamic RV systolic function. 

Suggestive of normal pulmonary artery pressure. Suggestive of central venous 

pressure 5-10 mmHg.  


3.  Mild aortic valve sclerosis. No aortic regurgitation. 


4.  No pericardial effusion. 


5.  Otherwise normal appearing echocardiogram Doppler findings.  





DISCHARGE DIAGNOSES:


Vasovagal syncope vs medication related





SECONDARY DIAGNOSIS:


HTN, gout, DLP, CAD/ CABG 2004, DM2, hypothyroidism, Hx of MDD was hospitalized 

in March 2017 / hx of electroshock therapy, Iron deficiency anemia, left 

proximal femur Fracture in 2016, Right femur Neck fracture in March 2021, 

Vertebroplasty , Mixed incontinence, right hip fracture and right hemiarth

roplasty on 03/28/21





COMPLICATIONS/CHIEF COMPLAINT: Syncope And Collapse.





HOSPITAL COURSE: 85-year-old female with PMH of HTN, gout, DLP, CAD/ CABG 2004, 

DM2, hypothyroidism, Hx of MDD was hospitalized in March 2017 / hx of 

electroshock therapy, Iron deficiency anemia, left proximal femur Fracture in 

2016, Right femur Neck fracture in March 2021, Vertebroplasty , Mixed incontinen

ce was in ARU from April 2 to April 19 after her right hip fracture and right 

hemiarthroplasty done on March 28 was at home doing okay until this morning when

she suffered a syncopal episode while eating her breakfast. Her daughter who 

witnessed the episode reported that she was sitting at the table having 

breakfast when suddenly her both her hands started shaking and she slumped f

orward. She was very pale was nonresponsive and she thought that the patient had

briefly stopped breathing. She immediately called 911 and was instructed to 

check her mouth for any food aspiration after she checked that she lowered the 

pt to the ground as per 911's instructions. Patient soon  regained her 

consciousness and was able to talk and respond appropriately. She felt the whole

episode lasted about 5-10 minute.  . There was no confusion after the episode 

and there was no stool or urine incontinence. Daughter did not notice any 

seizure-like activity . By the time EMS reached the scene, patient was back to 

her usual self. The patient was admitted for evaluation for syncope.





Syncope


vasovagal syncope vs medication related. 


no orthostatic hypotension noted. 


No events on telemetry. 


She had a multiple antihypertensives and antidepressant medications


CT head negative


carotid ultrasound no critical blockages present. Referred to Dr Kirby


echocardiogram as above, no valvular abnormalities, normal EF, no pulmonary 

hypertension only has grade 1 diastolic dysfunction. 


EKG sinus rhythm, with bradycardia stopped labetalol





Hypertension


Blood pressure in normal range at present


Will also stop amlodipine and labetelol. I think all 3 medications were too much

for this frail, elderly patient. 


Will only continue lisinopril 5 mg at bedtime. If needed lisinopril dosage can 

be increased to 10 mg. 





Hx of MDD 


Escitalopram, aripiprazole





Gout


Allopurinol





Dyslipidemia / Coronary artery disease /CABG


Rosuvastatin


Labetalol stopped as patient was bradycardic in ED





Diabetes mellitus type 2


A1c 6.1


on metformin at home 


Will continue on discharge





Hypothyroidism


Levothyroxine





DISCHARGE MEDICATIONS: Please see below.


 


ALLERGIES: Please see below.





PHYSICAL EXAMINATION ON DISCHARGE:


VITAL SIGNS: Please see below.


General Exam:  Positive: Alert, Cooperative, No Acute Distress


Eye Exam:  Positive: PERRLA, Conjunctiva & lids normal, EOMI; 


   Negative: Sclera icteric


Neck Exam:  Positive: Supple; 


   Negative: JVD, thyromegaly


Chest Exam:  Positive: Clear to auscultation, Normal air movement


Heart Exam:  Positive: Rate Normal, Regular Rhythm, Normal S1, Normal S2, 

Murmurs (soft systolic murmur); 


   Negative: Rubs


Telemetry:  Positive: No significant arrhythmia


Abdomen Exam:  Positive: Normal bowel sounds, Soft; 


   Negative: Tenderness, Hepatosplenomegaly


Extremity Exam:  Negative: Clubbing, Cyanosis, Edema





LABORATORY DATA: Please see below.





ACTIVITY: [As tolerated].





DIET: Carb consistent





DISCHARGE PLAN: [facility]. 





DISPOSITION: .





DISCHARGE INSTRUCTIONS:


Follow up with PMD in 1 month


Referred to Dr Kirby for moderate carotid stenosis. 


Follow up with Dr Amisha Pierre for MDD in 2 weeks





DISCHARGE CONDITION: [Stable].





TIME SPENT ON DISCHARGE: 35 minutes.





Vital Signs/I&Os





Vital Signs








  Date Time  Temp Pulse Resp B/P (MAP) Pulse Ox O2 Delivery O2 Flow Rate FiO2


 


4/29/21 06:00 97.5 75 16 139/60 (86) 100 Room Air  














I&O- Last 24 Hours up to 6 AM 


 


 4/29/21





 06:00


 


Intake Total 1100 ml


 


Output Total 1075 ml


 


Balance 25 ml











Laboratory Data


Labs 24H


Laboratory Tests 2


4/29/21 08:21: Coronavirus (COVID-19)(PCR) NEGATIVE





Discharge Medications


Scheduled


Allopurinol (Zyloprim) 300 Mg Tab, 300 MG PO DAILY, (Reported)


Aripiprazole (Aripiprazole) 2 Mg Tablet, 2 MG PO DAILY, (Reported)


Ascorbic Acid (Vitamin C) 500 Mg Capsule, 500 MG PO DAILY, (Reported)


Aspirin (Aspirin EC) 81 Mg Tablet.dr, 81 MG PO DAILY, (Reported)


Calcium Carbonate/Vitamin D3 (Calcium 500-Vit D3 200 Caplet) 1 Tab Tab, 1 TAB PO

 DAILY, (Reported)


   TAKES AT LUNCHTIME 


Docusate Sodium (Docusate Sodium) 100 Mg Capsule, 100 MG PO DAILY, (Reported)


Escitalopram Oxalate (Lexapro) 20 Mg Tablet, 30 MG PO QHS, (Reported)


Ferrous Sulfate (Ferrous Sulfate) 325 Mg Tab, 325 MG PO DAILY, (Reported)


Levothyroxine Sodium (Levothyroxine Sodium) 25 Mcg Tab, 25 MCG PO DAILY, 

(Reported)


Lisinopril (Lisinopril) 5 Mg Tablet, 5 MG PO QHS, (Reported)


Magnesium Oxide (Magnesium Oxide) 400 Mg Tablet, 400 MG PO DAILY, (Reported)


   NOON 


Metformin HCl (Metformin HCl ER) 500 Mg Tab.er.24h, 500 MG PO BID, (Reported)


Montelukast Sodium (Singulair) 10 Mg Tab, 10 MG PO QHS, (Reported)


Multivitamins (Thera M Plus Tablet) 1 Tab Tab, 1 TAB PO DAILY, (Reported)


   TAKES AT LUNCHTIME 


Pantoprazole Sodium (Pantoprazole Sodium) 40 Mg Tablet.dr, 40 MG PO DAILY, 

(Reported)


Rosuvastatin Calcium (Rosuvastatin Calcium) 5 Mg Tablet, 5 MG PO QHS, (Reported)





Scheduled PRN


Acetaminophen (Acetaminophen) 500 Mg Tablet, 1,000 MG PO Q6H PRN for PAIN, 

(Reported)


Trazodone HCl (Trazodone HCl) 50 Mg Tablet, 50 MG PO QHS PRN for SLEEP, 

(Reported)





Allergies


Coded Allergies:  


     tramadol (Unverified  Allergy, Unknown, 3/26/21)











LESLEY MANNING MD                   Apr 29, 2021 11:00

## 2021-06-08 ENCOUNTER — HOSPITAL ENCOUNTER (OUTPATIENT)
Dept: HOSPITAL 53 - M SHH | Age: 86
End: 2021-06-08
Attending: INTERNAL MEDICINE
Payer: MEDICARE

## 2021-06-08 ENCOUNTER — HOSPITAL ENCOUNTER (OUTPATIENT)
Dept: HOSPITAL 53 - M LAB REF | Age: 86
End: 2021-06-08
Attending: INTERNAL MEDICINE
Payer: MEDICARE

## 2021-06-08 DIAGNOSIS — N39.0: Primary | ICD-10-CM

## 2021-06-08 DIAGNOSIS — Z79.899: Primary | ICD-10-CM

## 2021-06-08 LAB
ALBUMIN SERPL BCG-MCNC: 3.3 GM/DL (ref 3.2–5.2)
ALT SERPL W P-5'-P-CCNC: 18 U/L (ref 12–78)
BILIRUB SERPL-MCNC: 0.3 MG/DL (ref 0.2–1)
BUN SERPL-MCNC: 15 MG/DL (ref 7–18)
CALCIUM SERPL-MCNC: 9.4 MG/DL (ref 8.8–10.2)
CHLORIDE SERPL-SCNC: 104 MEQ/L (ref 98–107)
CO2 SERPL-SCNC: 29 MEQ/L (ref 21–32)
CREAT SERPL-MCNC: 0.6 MG/DL (ref 0.55–1.3)
GFR SERPL CREATININE-BSD FRML MDRD: > 60 ML/MIN/{1.73_M2} (ref 32–?)
GLUCOSE SERPL-MCNC: 113 MG/DL (ref 70–100)
HCT VFR BLD AUTO: 37 % (ref 36–47)
HGB BLD-MCNC: 11.8 G/DL (ref 12–15.5)
MCH RBC QN AUTO: 30.7 PG (ref 27–33)
MCHC RBC AUTO-ENTMCNC: 31.9 G/DL (ref 32–36.5)
MCV RBC AUTO: 96.4 FL (ref 80–96)
PLATELET # BLD AUTO: 326 10^3/UL (ref 150–450)
POTASSIUM SERPL-SCNC: 4.4 MEQ/L (ref 3.5–5.1)
PROT SERPL-MCNC: 6.1 GM/DL (ref 6.4–8.2)
RBC # BLD AUTO: 3.84 10^6/UL (ref 4–5.4)
SODIUM SERPL-SCNC: 139 MEQ/L (ref 136–145)
WBC # BLD AUTO: 5.1 10^3/UL (ref 4–10)

## 2022-05-18 ENCOUNTER — HOSPITAL ENCOUNTER (OUTPATIENT)
Dept: HOSPITAL 53 - M PLALAB | Age: 87
End: 2022-05-18
Attending: PSYCHIATRY & NEUROLOGY
Payer: MEDICARE

## 2022-05-18 DIAGNOSIS — R41.3: ICD-10-CM

## 2022-05-18 DIAGNOSIS — E53.8: ICD-10-CM

## 2022-05-18 DIAGNOSIS — E03.9: Primary | ICD-10-CM

## 2022-05-18 LAB
TSH SERPL DL<=0.005 MIU/L-ACNC: 1.94 UIU/ML (ref 0.36–3.74)
VIT B12 SERPL-MCNC: 568 PG/ML (ref 247–911)

## 2022-06-30 ENCOUNTER — HOSPITAL ENCOUNTER (OUTPATIENT)
Dept: HOSPITAL 53 - M SMT | Age: 87
End: 2022-06-30
Attending: PHYSICIAN ASSISTANT
Payer: MEDICARE

## 2022-06-30 DIAGNOSIS — N39.0: Primary | ICD-10-CM

## 2022-06-30 LAB
APPEARANCE UR: CLEAR
BACTERIA UR QL AUTO: NEGATIVE
BILIRUB UR QL STRIP.AUTO: NEGATIVE
GLUCOSE UR QL STRIP.AUTO: NEGATIVE MG/DL
HGB UR QL STRIP.AUTO: NEGATIVE
KETONES UR QL STRIP.AUTO: (no result) MG/DL
LEUKOCYTE ESTERASE UR QL STRIP.AUTO: NEGATIVE
MUCOUS THREADS URNS QL MICRO: (no result)
NITRITE UR QL STRIP.AUTO: NEGATIVE
PH UR STRIP.AUTO: 7 UNITS (ref 5–9)
PROT UR QL STRIP.AUTO: NEGATIVE MG/DL
RBC # UR AUTO: 0 /HPF (ref 0–3)
SP GR UR STRIP.AUTO: 1.01 (ref 1–1.03)
SQUAMOUS #/AREA URNS AUTO: 0 /HPF (ref 0–6)
UROBILINOGEN UR QL STRIP.AUTO: 0.2 MG/DL (ref 0–2)
WBC #/AREA URNS AUTO: 1 /HPF (ref 0–3)

## 2023-04-02 ENCOUNTER — HOSPITAL ENCOUNTER (EMERGENCY)
Dept: HOSPITAL 53 - M ED | Age: 88
Discharge: ANOTHER HOSPITAL | End: 2023-04-02
Payer: MEDICARE

## 2023-04-02 VITALS — DIASTOLIC BLOOD PRESSURE: 70 MMHG | SYSTOLIC BLOOD PRESSURE: 182 MMHG

## 2023-04-02 DIAGNOSIS — Z88.5: ICD-10-CM

## 2023-04-02 DIAGNOSIS — S12.111A: Primary | ICD-10-CM
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
## 2023-04-02 DIAGNOSIS — I44.0: ICD-10-CM

## 2023-04-02 LAB
BUN SERPL-MCNC: 16 MG/DL (ref 9–23)
CALCIUM SERPL-MCNC: 9.2 MG/DL (ref 8.3–10.6)
CHLORIDE SERPL-SCNC: 102 MMOL/L (ref 98–107)
CO2 SERPL-SCNC: 31 MMOL/L (ref 20–31)
CREAT SERPL-MCNC: 0.6 MG/DL (ref 0.55–1.3)
GFR SERPL CREATININE-BSD FRML MDRD: > 60 ML/MIN/{1.73_M2} (ref 32–?)
GLUCOSE SERPL-MCNC: 107 MG/DL (ref 74–106)
HCT VFR BLD AUTO: 38.6 % (ref 36–47)
HGB BLD-MCNC: 13 G/DL (ref 12–15.5)
MCH RBC QN AUTO: 32.9 PG (ref 27–33)
MCHC RBC AUTO-ENTMCNC: 33.7 G/DL (ref 32–36.5)
MCV RBC AUTO: 97.7 FL (ref 80–96)
PLATELET # BLD AUTO: 208 10^3/UL (ref 150–450)
POTASSIUM SERPL-SCNC: 4.4 MMOL/L (ref 3.5–5.1)
RBC # BLD AUTO: 3.95 10^6/UL (ref 4–5.4)
SODIUM SERPL-SCNC: 138 MMOL/L (ref 136–145)
WBC # BLD AUTO: 10.9 10^3/UL (ref 4–10)

## 2023-04-02 PROCEDURE — 86900 BLOOD TYPING SEROLOGIC ABO: CPT

## 2023-04-02 PROCEDURE — 93005 ELECTROCARDIOGRAM TRACING: CPT

## 2023-04-02 PROCEDURE — 85027 COMPLETE CBC AUTOMATED: CPT

## 2023-04-02 PROCEDURE — 99285 EMERGENCY DEPT VISIT HI MDM: CPT

## 2023-04-02 PROCEDURE — 73560 X-RAY EXAM OF KNEE 1 OR 2: CPT

## 2023-04-02 PROCEDURE — 96361 HYDRATE IV INFUSION ADD-ON: CPT

## 2023-04-02 PROCEDURE — 80048 BASIC METABOLIC PNL TOTAL CA: CPT

## 2023-04-02 PROCEDURE — 87635 SARS-COV-2 COVID-19 AMP PRB: CPT

## 2023-04-02 PROCEDURE — 86850 RBC ANTIBODY SCREEN: CPT

## 2023-04-02 PROCEDURE — 70450 CT HEAD/BRAIN W/O DYE: CPT

## 2023-04-02 PROCEDURE — 72125 CT NECK SPINE W/O DYE: CPT

## 2023-04-02 PROCEDURE — 72100 X-RAY EXAM L-S SPINE 2/3 VWS: CPT

## 2023-04-02 PROCEDURE — 36415 COLL VENOUS BLD VENIPUNCTURE: CPT

## 2023-04-02 PROCEDURE — 96375 TX/PRO/DX INJ NEW DRUG ADDON: CPT

## 2023-04-02 PROCEDURE — 96374 THER/PROPH/DIAG INJ IV PUSH: CPT

## 2023-04-02 PROCEDURE — 86901 BLOOD TYPING SEROLOGIC RH(D): CPT

## 2024-05-15 ENCOUNTER — HOSPITAL ENCOUNTER (OUTPATIENT)
Dept: HOSPITAL 53 - SKLAB7 | Age: 89
End: 2024-05-15
Attending: INTERNAL MEDICINE
Payer: MEDICARE

## 2024-05-15 DIAGNOSIS — Z51.81: ICD-10-CM

## 2024-05-15 DIAGNOSIS — E03.9: ICD-10-CM

## 2024-05-15 DIAGNOSIS — M10.9: ICD-10-CM

## 2024-05-15 DIAGNOSIS — E11.9: ICD-10-CM

## 2024-05-15 DIAGNOSIS — I10: Primary | ICD-10-CM

## 2024-05-15 LAB
ALBUMIN SERPL BCG-MCNC: 3.1 G/DL (ref 3.2–5.2)
ALP SERPL-CCNC: 79 U/L (ref 46–116)
ALT SERPL W P-5'-P-CCNC: 12 U/L (ref 7–40)
AST SERPL-CCNC: 21 U/L (ref ?–34)
BASOPHILS # BLD AUTO: 0.1 10^3/UL (ref 0–0.2)
BASOPHILS NFR BLD AUTO: 0.5 % (ref 0–1)
BILIRUB SERPL-MCNC: 0.2 MG/DL (ref 0.3–1.2)
BUN SERPL-MCNC: 31 MG/DL (ref 9–23)
CALCIUM SERPL-MCNC: 9.6 MG/DL (ref 8.3–10.6)
CHLORIDE SERPL-SCNC: 100 MMOL/L (ref 98–107)
CO2 SERPL-SCNC: 29 MMOL/L (ref 20–31)
CREAT SERPL-MCNC: 0.51 MG/DL (ref 0.55–1.3)
EOSINOPHIL # BLD AUTO: 0 10^3/UL (ref 0–0.5)
EOSINOPHIL NFR BLD AUTO: 0.4 % (ref 0–3)
EST. AVERAGE GLUCOSE BLD GHB EST-MCNC: 120 MG/DL (ref 60–110)
GFR SERPL CREATININE-BSD FRML MDRD: > 60 ML/MIN/{1.73_M2} (ref 32–?)
GLUCOSE SERPL-MCNC: 102 MG/DL (ref 74–106)
HCT VFR BLD AUTO: 34.2 % (ref 36–47)
HGB BLD-MCNC: 11.8 G/DL (ref 12–15.5)
LYMPHOCYTES # BLD AUTO: 1.2 10^3/UL (ref 1.5–5)
LYMPHOCYTES NFR BLD AUTO: 11.1 % (ref 24–44)
MCH RBC QN AUTO: 32.1 PG (ref 27–33)
MCHC RBC AUTO-ENTMCNC: 34.5 G/DL (ref 32–36.5)
MCV RBC AUTO: 92.9 FL (ref 80–96)
MONOCYTES # BLD AUTO: 0.7 10^3/UL (ref 0–0.8)
MONOCYTES NFR BLD AUTO: 6.6 % (ref 2–8)
NEUTROPHILS # BLD AUTO: 8.9 10^3/UL (ref 1.5–8.5)
NEUTROPHILS NFR BLD AUTO: 81 % (ref 36–66)
PLATELET # BLD AUTO: (no result) 10^3/UL (ref 150–450)
POTASSIUM SERPL-SCNC: 5 MMOL/L (ref 3.5–5.1)
PROT SERPL-MCNC: 6.6 G/DL (ref 5.7–8.2)
RBC # BLD AUTO: 3.68 10^6/UL (ref 4–5.4)
SODIUM SERPL-SCNC: 132 MMOL/L (ref 136–145)
TSH SERPL DL<=0.005 MIU/L-ACNC: 5.19 UIU/ML (ref 0.55–4.78)
URATE SERPL-MCNC: 3 MG/DL (ref 3.1–7.8)
VALPROATE SERPL-MCNC: 51.7 UG/ML (ref 50–100)
WBC # BLD AUTO: 11 10^3/UL (ref 4–10)

## 2024-05-23 ENCOUNTER — HOSPITAL ENCOUNTER (OUTPATIENT)
Dept: HOSPITAL 53 - SKLAB7 | Age: 89
End: 2024-05-23
Attending: INTERNAL MEDICINE
Payer: MEDICARE

## 2024-05-23 DIAGNOSIS — M10.9: Primary | ICD-10-CM

## 2024-05-23 DIAGNOSIS — I10: ICD-10-CM

## 2024-05-23 DIAGNOSIS — E11.9: ICD-10-CM

## 2024-05-23 DIAGNOSIS — Z79.899: ICD-10-CM

## 2024-05-23 DIAGNOSIS — E03.9: ICD-10-CM

## 2024-05-23 LAB
ALBUMIN SERPL BCG-MCNC: 2.9 G/DL (ref 3.2–5.2)
ALP SERPL-CCNC: 69 U/L (ref 46–116)
ALT SERPL W P-5'-P-CCNC: 10 U/L (ref 7–40)
AST SERPL-CCNC: < 8 U/L (ref ?–34)
BASOPHILS # BLD AUTO: 0.1 10^3/UL (ref 0–0.2)
BASOPHILS NFR BLD AUTO: 0.8 % (ref 0–1)
BILIRUB SERPL-MCNC: 0.3 MG/DL (ref 0.3–1.2)
BUN SERPL-MCNC: 20 MG/DL (ref 9–23)
CALCIUM SERPL-MCNC: 9.1 MG/DL (ref 8.3–10.6)
CHLORIDE SERPL-SCNC: 99 MMOL/L (ref 98–107)
CO2 SERPL-SCNC: 26 MMOL/L (ref 20–31)
CREAT SERPL-MCNC: 0.49 MG/DL (ref 0.55–1.3)
EOSINOPHIL # BLD AUTO: 0.1 10^3/UL (ref 0–0.5)
EOSINOPHIL NFR BLD AUTO: 1.1 % (ref 0–3)
EST. AVERAGE GLUCOSE BLD GHB EST-MCNC: 128 MG/DL (ref 60–110)
GFR SERPL CREATININE-BSD FRML MDRD: > 60 ML/MIN/{1.73_M2} (ref 32–?)
GLUCOSE SERPL-MCNC: 120 MG/DL (ref 74–106)
HCT VFR BLD AUTO: 34.8 % (ref 36–47)
HGB BLD-MCNC: 11.7 G/DL (ref 12–15.5)
LYMPHOCYTES # BLD AUTO: 1.2 10^3/UL (ref 1.5–5)
LYMPHOCYTES NFR BLD AUTO: 18.5 % (ref 24–44)
MCH RBC QN AUTO: 31.2 PG (ref 27–33)
MCHC RBC AUTO-ENTMCNC: 33.6 G/DL (ref 32–36.5)
MCV RBC AUTO: 92.8 FL (ref 80–96)
MONOCYTES # BLD AUTO: 0.5 10^3/UL (ref 0–0.8)
MONOCYTES NFR BLD AUTO: 7.7 % (ref 2–8)
NEUTROPHILS # BLD AUTO: 4.6 10^3/UL (ref 1.5–8.5)
NEUTROPHILS NFR BLD AUTO: 71.6 % (ref 36–66)
PLATELET # BLD AUTO: 277 10^3/UL (ref 150–450)
POTASSIUM SERPL-SCNC: 4.6 MMOL/L (ref 3.5–5.1)
PROT SERPL-MCNC: 6.2 G/DL (ref 5.7–8.2)
RBC # BLD AUTO: 3.75 10^6/UL (ref 4–5.4)
SODIUM SERPL-SCNC: 132 MMOL/L (ref 136–145)
TSH SERPL DL<=0.005 MIU/L-ACNC: 3.91 UIU/ML (ref 0.55–4.78)
URATE SERPL-MCNC: 2.7 MG/DL (ref 3.1–7.8)
VALPROATE SERPL-MCNC: 48.4 UG/ML (ref 50–100)
WBC # BLD AUTO: 6.5 10^3/UL (ref 4–10)

## 2024-06-06 ENCOUNTER — HOSPITAL ENCOUNTER (OUTPATIENT)
Dept: HOSPITAL 53 - SKLAB7 | Age: 89
End: 2024-06-06
Attending: INTERNAL MEDICINE
Payer: MEDICARE

## 2024-06-06 DIAGNOSIS — R06.02: Primary | ICD-10-CM

## 2024-06-06 DIAGNOSIS — Z95.1: ICD-10-CM

## 2024-06-06 DIAGNOSIS — I51.7: ICD-10-CM

## 2024-06-06 DIAGNOSIS — I51.7: Primary | ICD-10-CM

## 2024-06-06 LAB
BASOPHILS # BLD AUTO: 0.1 10^3/UL (ref 0–0.2)
BASOPHILS NFR BLD AUTO: 0.7 % (ref 0–1)
BUN SERPL-MCNC: 22 MG/DL (ref 9–23)
CALCIUM SERPL-MCNC: 9.3 MG/DL (ref 8.3–10.6)
CHLORIDE SERPL-SCNC: 96 MMOL/L (ref 98–107)
CO2 SERPL-SCNC: 29 MMOL/L (ref 20–31)
CREAT SERPL-MCNC: 0.54 MG/DL (ref 0.55–1.3)
EOSINOPHIL # BLD AUTO: 0.1 10^3/UL (ref 0–0.5)
EOSINOPHIL NFR BLD AUTO: 0.8 % (ref 0–3)
GFR SERPL CREATININE-BSD FRML MDRD: > 60 ML/MIN/{1.73_M2} (ref 32–?)
GLUCOSE SERPL-MCNC: 121 MG/DL (ref 74–106)
HCT VFR BLD AUTO: 34.8 % (ref 36–47)
HGB BLD-MCNC: 11.9 G/DL (ref 12–15.5)
LYMPHOCYTES # BLD AUTO: 1.2 10^3/UL (ref 1.5–5)
LYMPHOCYTES NFR BLD AUTO: 12.8 % (ref 24–44)
MCH RBC QN AUTO: 31.8 PG (ref 27–33)
MCHC RBC AUTO-ENTMCNC: 34.2 G/DL (ref 32–36.5)
MCV RBC AUTO: 93 FL (ref 80–96)
MONOCYTES # BLD AUTO: 0.7 10^3/UL (ref 0–0.8)
MONOCYTES NFR BLD AUTO: 8.3 % (ref 2–8)
NEUTROPHILS # BLD AUTO: 6.8 10^3/UL (ref 1.5–8.5)
NEUTROPHILS NFR BLD AUTO: 76.4 % (ref 36–66)
PLATELET # BLD AUTO: 357 10^3/UL (ref 150–450)
POTASSIUM SERPL-SCNC: 4.7 MMOL/L (ref 3.5–5.1)
RBC # BLD AUTO: 3.74 10^6/UL (ref 4–5.4)
SODIUM SERPL-SCNC: 131 MMOL/L (ref 136–145)
WBC # BLD AUTO: 9 10^3/UL (ref 4–10)

## 2024-06-07 ENCOUNTER — HOSPITAL ENCOUNTER (OUTPATIENT)
Dept: HOSPITAL 53 - SKLAB7 | Age: 89
End: 2024-06-07
Attending: INTERNAL MEDICINE
Payer: MEDICARE

## 2024-06-07 DIAGNOSIS — I50.9: Primary | ICD-10-CM

## 2024-06-13 ENCOUNTER — HOSPITAL ENCOUNTER (OUTPATIENT)
Dept: HOSPITAL 53 - SKLAB7 | Age: 89
End: 2024-06-13
Attending: INTERNAL MEDICINE
Payer: MEDICARE

## 2024-06-13 DIAGNOSIS — I50.9: Primary | ICD-10-CM

## 2024-06-13 LAB
BUN SERPL-MCNC: 22 MG/DL (ref 9–23)
CALCIUM SERPL-MCNC: 9.4 MG/DL (ref 8.3–10.6)
CHLORIDE SERPL-SCNC: 95 MMOL/L (ref 98–107)
CO2 SERPL-SCNC: 29 MMOL/L (ref 20–31)
CREAT SERPL-MCNC: 0.59 MG/DL (ref 0.55–1.3)
GFR SERPL CREATININE-BSD FRML MDRD: > 60 ML/MIN/{1.73_M2} (ref 32–?)
GLUCOSE SERPL-MCNC: 87 MG/DL (ref 74–106)
POTASSIUM SERPL-SCNC: 4.7 MMOL/L (ref 3.5–5.1)
SODIUM SERPL-SCNC: 130 MMOL/L (ref 136–145)

## 2024-06-27 ENCOUNTER — HOSPITAL ENCOUNTER (OUTPATIENT)
Dept: HOSPITAL 53 - SKLAB7 | Age: 89
End: 2024-06-27
Attending: INTERNAL MEDICINE
Payer: MEDICARE

## 2024-06-27 DIAGNOSIS — I50.9: Primary | ICD-10-CM

## 2024-06-27 LAB
BUN SERPL-MCNC: 36 MG/DL (ref 9–23)
CALCIUM SERPL-MCNC: 9.3 MG/DL (ref 8.3–10.6)
CHLORIDE SERPL-SCNC: 96 MMOL/L (ref 98–107)
CO2 SERPL-SCNC: 32 MMOL/L (ref 20–31)
CREAT SERPL-MCNC: 0.64 MG/DL (ref 0.55–1.3)
GFR SERPL CREATININE-BSD FRML MDRD: > 60 ML/MIN/{1.73_M2} (ref 32–?)
GLUCOSE SERPL-MCNC: 185 MG/DL (ref 74–106)
POTASSIUM SERPL-SCNC: 4.7 MMOL/L (ref 3.5–5.1)
SODIUM SERPL-SCNC: 132 MMOL/L (ref 136–145)

## 2024-07-02 ENCOUNTER — HOSPITAL ENCOUNTER (OUTPATIENT)
Dept: HOSPITAL 53 - M RAD | Age: 89
End: 2024-07-02
Attending: NURSE PRACTITIONER
Payer: MEDICARE

## 2024-07-02 DIAGNOSIS — M54.2: Primary | ICD-10-CM

## 2024-07-26 ENCOUNTER — HOSPITAL ENCOUNTER (OUTPATIENT)
Dept: HOSPITAL 53 - SKLAB7 | Age: 89
End: 2024-07-26
Attending: NURSE PRACTITIONER
Payer: MEDICARE

## 2024-07-26 DIAGNOSIS — R30.0: Primary | ICD-10-CM

## 2024-07-26 LAB
BASOPHILS # BLD AUTO: 0.1 10^3/UL (ref 0–0.2)
BASOPHILS NFR BLD AUTO: 0.9 % (ref 0–1)
EOSINOPHIL # BLD AUTO: 0.2 10^3/UL (ref 0–0.5)
EOSINOPHIL NFR BLD AUTO: 2.6 % (ref 0–3)
HCT VFR BLD AUTO: 32.1 % (ref 36–47)
HGB BLD-MCNC: 10.6 G/DL (ref 12–15.5)
LYMPHOCYTES # BLD AUTO: 1.5 10^3/UL (ref 1.5–5)
LYMPHOCYTES NFR BLD AUTO: 22.7 % (ref 24–44)
MCH RBC QN AUTO: 30.9 PG (ref 27–33)
MCHC RBC AUTO-ENTMCNC: 33 G/DL (ref 32–36.5)
MCV RBC AUTO: 93.6 FL (ref 80–96)
MONOCYTES # BLD AUTO: 0.6 10^3/UL (ref 0–0.8)
MONOCYTES NFR BLD AUTO: 8.8 % (ref 2–8)
NEUTROPHILS # BLD AUTO: 4.2 10^3/UL (ref 1.5–8.5)
NEUTROPHILS NFR BLD AUTO: 64.4 % (ref 36–66)
PLATELET # BLD AUTO: 343 10^3/UL (ref 150–450)
RBC # BLD AUTO: 3.43 10^6/UL (ref 4–5.4)
WBC # BLD AUTO: 6.6 10^3/UL (ref 4–10)

## 2024-07-27 LAB
BUN SERPL-MCNC: 38 MG/DL (ref 9–23)
CALCIUM SERPL-MCNC: 9.1 MG/DL (ref 8.3–10.6)
CHLORIDE SERPL-SCNC: 95 MMOL/L (ref 98–107)
CO2 SERPL-SCNC: 31 MMOL/L (ref 20–31)
CREAT SERPL-MCNC: 0.69 MG/DL (ref 0.55–1.3)
GFR SERPL CREATININE-BSD FRML MDRD: > 60 ML/MIN/{1.73_M2} (ref 32–?)
GLUCOSE SERPL-MCNC: 158 MG/DL (ref 74–106)
POTASSIUM SERPL-SCNC: 4.1 MMOL/L (ref 3.5–5.1)
SODIUM SERPL-SCNC: 132 MMOL/L (ref 136–145)

## 2024-07-28 ENCOUNTER — HOSPITAL ENCOUNTER (OUTPATIENT)
Dept: HOSPITAL 53 - SKLAB7 | Age: 89
End: 2024-07-28
Attending: STUDENT IN AN ORGANIZED HEALTH CARE EDUCATION/TRAINING PROGRAM
Payer: MEDICARE

## 2024-07-28 DIAGNOSIS — I50.9: ICD-10-CM

## 2024-07-28 DIAGNOSIS — J44.1: Primary | ICD-10-CM

## 2024-07-28 LAB
BASOPHILS # BLD AUTO: 0.1 10^3/UL (ref 0–0.2)
BASOPHILS NFR BLD AUTO: 0.2 % (ref 0–1)
BUN SERPL-MCNC: 33 MG/DL (ref 9–23)
CALCIUM SERPL-MCNC: 9.4 MG/DL (ref 8.3–10.6)
CHLORIDE SERPL-SCNC: 94 MMOL/L (ref 98–107)
CO2 SERPL-SCNC: 34 MMOL/L (ref 20–31)
CREAT SERPL-MCNC: 0.79 MG/DL (ref 0.55–1.3)
EOSINOPHIL # BLD AUTO: 0.1 10^3/UL (ref 0–0.5)
EOSINOPHIL NFR BLD AUTO: 0.5 % (ref 0–3)
GFR SERPL CREATININE-BSD FRML MDRD: > 60 ML/MIN/{1.73_M2} (ref 32–?)
GLUCOSE SERPL-MCNC: 211 MG/DL (ref 74–106)
HCT VFR BLD AUTO: 33.4 % (ref 36–47)
HGB BLD-MCNC: 11 G/DL (ref 12–15.5)
LYMPHOCYTES # BLD AUTO: 0.5 10^3/UL (ref 1.5–5)
LYMPHOCYTES NFR BLD AUTO: 1.5 % (ref 24–44)
MCH RBC QN AUTO: 31.3 PG (ref 27–33)
MCHC RBC AUTO-ENTMCNC: 32.9 G/DL (ref 32–36.5)
MCV RBC AUTO: 95.2 FL (ref 80–96)
MONOCYTES # BLD AUTO: 0.7 10^3/UL (ref 0–0.8)
MONOCYTES NFR BLD AUTO: 2.3 % (ref 2–8)
NEUTROPHILS # BLD AUTO: 28.2 10^3/UL (ref 1.5–8.5)
NEUTROPHILS NFR BLD AUTO: 94.4 % (ref 36–66)
PLATELET # BLD AUTO: 302 10^3/UL (ref 150–450)
POTASSIUM SERPL-SCNC: 4 MMOL/L (ref 3.5–5.1)
RBC # BLD AUTO: 3.51 10^6/UL (ref 4–5.4)
SODIUM SERPL-SCNC: 133 MMOL/L (ref 136–145)
WBC # BLD AUTO: 29.9 10^3/UL (ref 4–10)

## 2024-07-30 ENCOUNTER — HOSPITAL ENCOUNTER (OUTPATIENT)
Dept: HOSPITAL 53 - SKLAB7 | Age: 89
End: 2024-07-30
Attending: INTERNAL MEDICINE
Payer: MEDICARE

## 2024-07-30 DIAGNOSIS — N39.0: ICD-10-CM

## 2024-07-30 DIAGNOSIS — I50.9: Primary | ICD-10-CM

## 2024-07-30 DIAGNOSIS — E11.9: ICD-10-CM

## 2024-07-30 LAB
BASOPHILS # BLD AUTO: 0 10^3/UL (ref 0–0.2)
BASOPHILS NFR BLD AUTO: 0.4 % (ref 0–1)
BUN SERPL-MCNC: 38 MG/DL (ref 9–23)
CALCIUM SERPL-MCNC: 9.5 MG/DL (ref 8.3–10.6)
CHLORIDE SERPL-SCNC: 95 MMOL/L (ref 98–107)
CO2 SERPL-SCNC: 36 MMOL/L (ref 20–31)
CREAT SERPL-MCNC: 0.86 MG/DL (ref 0.55–1.3)
EOSINOPHIL # BLD AUTO: 0.3 10^3/UL (ref 0–0.5)
EOSINOPHIL NFR BLD AUTO: 2.7 % (ref 0–3)
EST. AVERAGE GLUCOSE BLD GHB EST-MCNC: 148 MG/DL (ref 60–110)
GFR SERPL CREATININE-BSD FRML MDRD: > 60 ML/MIN/{1.73_M2} (ref 32–?)
GLUCOSE SERPL-MCNC: 147 MG/DL (ref 74–106)
HCT VFR BLD AUTO: 29.1 % (ref 36–47)
HGB BLD-MCNC: 9.6 G/DL (ref 12–15.5)
LYMPHOCYTES # BLD AUTO: 0.7 10^3/UL (ref 1.5–5)
LYMPHOCYTES NFR BLD AUTO: 7.7 % (ref 24–44)
MCH RBC QN AUTO: 31.5 PG (ref 27–33)
MCHC RBC AUTO-ENTMCNC: 33 G/DL (ref 32–36.5)
MCV RBC AUTO: 95.4 FL (ref 80–96)
MONOCYTES # BLD AUTO: 0.6 10^3/UL (ref 0–0.8)
MONOCYTES NFR BLD AUTO: 6.3 % (ref 2–8)
NEUTROPHILS # BLD AUTO: 7.9 10^3/UL (ref 1.5–8.5)
NEUTROPHILS NFR BLD AUTO: 82.3 % (ref 36–66)
PLATELET # BLD AUTO: 275 10^3/UL (ref 150–450)
POTASSIUM SERPL-SCNC: 4.7 MMOL/L (ref 3.5–5.1)
RBC # BLD AUTO: 3.05 10^6/UL (ref 4–5.4)
SODIUM SERPL-SCNC: 135 MMOL/L (ref 136–145)
WBC # BLD AUTO: 9.5 10^3/UL (ref 4–10)

## 2024-08-02 ENCOUNTER — HOSPITAL ENCOUNTER (OUTPATIENT)
Dept: HOSPITAL 53 - SKLAB7 | Age: 89
End: 2024-08-02
Attending: INTERNAL MEDICINE
Payer: MEDICARE

## 2024-08-02 DIAGNOSIS — N39.0: Primary | ICD-10-CM

## 2024-08-02 LAB
BASOPHILS # BLD AUTO: 0.1 10^3/UL (ref 0–0.2)
BASOPHILS NFR BLD AUTO: 0.4 % (ref 0–1)
BUN SERPL-MCNC: 24 MG/DL (ref 9–23)
CALCIUM SERPL-MCNC: 9.3 MG/DL (ref 8.3–10.6)
CHLORIDE SERPL-SCNC: 94 MMOL/L (ref 98–107)
CO2 SERPL-SCNC: 32 MMOL/L (ref 20–31)
CREAT SERPL-MCNC: 0.63 MG/DL (ref 0.55–1.3)
EOSINOPHIL # BLD AUTO: 0.2 10^3/UL (ref 0–0.5)
EOSINOPHIL NFR BLD AUTO: 1.7 % (ref 0–3)
GFR SERPL CREATININE-BSD FRML MDRD: > 60 ML/MIN/{1.73_M2} (ref 32–?)
GLUCOSE SERPL-MCNC: 160 MG/DL (ref 74–106)
HCT VFR BLD AUTO: 29.6 % (ref 36–47)
HGB BLD-MCNC: 10 G/DL (ref 12–15.5)
LYMPHOCYTES # BLD AUTO: 1.4 10^3/UL (ref 1.5–5)
LYMPHOCYTES NFR BLD AUTO: 11.4 % (ref 24–44)
MCH RBC QN AUTO: 30.9 PG (ref 27–33)
MCHC RBC AUTO-ENTMCNC: 33.8 G/DL (ref 32–36.5)
MCV RBC AUTO: 91.4 FL (ref 80–96)
MONOCYTES # BLD AUTO: 0.7 10^3/UL (ref 0–0.8)
MONOCYTES NFR BLD AUTO: 5.5 % (ref 2–8)
NEUTROPHILS # BLD AUTO: 9.4 10^3/UL (ref 1.5–8.5)
NEUTROPHILS NFR BLD AUTO: 79.9 % (ref 36–66)
PLATELET # BLD AUTO: 354 10^3/UL (ref 150–450)
POTASSIUM SERPL-SCNC: 3.3 MMOL/L (ref 3.5–5.1)
RBC # BLD AUTO: 3.24 10^6/UL (ref 4–5.4)
SODIUM SERPL-SCNC: 133 MMOL/L (ref 136–145)
WBC # BLD AUTO: 11.8 10^3/UL (ref 4–10)

## 2024-08-05 ENCOUNTER — HOSPITAL ENCOUNTER (OUTPATIENT)
Dept: HOSPITAL 53 - SKLAB7 | Age: 89
End: 2024-08-05
Attending: INTERNAL MEDICINE
Payer: MEDICARE

## 2024-08-05 DIAGNOSIS — N39.0: Primary | ICD-10-CM

## 2024-08-05 LAB
APPEARANCE UR: CLEAR
BACTERIA UR QL AUTO: (no result)
BILIRUB UR QL STRIP.AUTO: NEGATIVE
GLUCOSE UR QL STRIP.AUTO: NEGATIVE MG/DL
HGB UR QL STRIP.AUTO: NEGATIVE
KETONES UR QL STRIP.AUTO: NEGATIVE MG/DL
LEUKOCYTE ESTERASE UR QL STRIP.AUTO: NEGATIVE
NITRITE UR QL STRIP.AUTO: NEGATIVE
PH UR STRIP.AUTO: 7 UNITS (ref 5–9)
PROT UR QL STRIP.AUTO: NEGATIVE MG/DL
RBC # UR AUTO: 3 /HPF (ref 0–3)
SP GR UR STRIP.AUTO: 1.01 (ref 1–1.03)
SQUAMOUS #/AREA URNS AUTO: 0 /HPF (ref 0–6)
UROBILINOGEN UR QL STRIP.AUTO: 0.2 MG/DL (ref 0–2)
WBC #/AREA URNS AUTO: 3 /HPF (ref 0–3)

## 2024-08-08 ENCOUNTER — HOSPITAL ENCOUNTER (OUTPATIENT)
Dept: HOSPITAL 53 - SKLAB7 | Age: 89
End: 2024-08-08
Attending: INTERNAL MEDICINE
Payer: MEDICARE

## 2024-08-08 DIAGNOSIS — R05.9: Primary | ICD-10-CM

## 2024-08-08 DIAGNOSIS — I70.8: ICD-10-CM

## 2024-08-13 ENCOUNTER — HOSPITAL ENCOUNTER (OUTPATIENT)
Dept: HOSPITAL 53 - SKLAB7 | Age: 89
End: 2024-08-13
Attending: INTERNAL MEDICINE
Payer: MEDICARE

## 2024-08-13 DIAGNOSIS — I50.9: Primary | ICD-10-CM

## 2024-08-13 LAB
BUN SERPL-MCNC: 41 MG/DL (ref 9–23)
CALCIUM SERPL-MCNC: 9.9 MG/DL (ref 8.3–10.6)
CHLORIDE SERPL-SCNC: 92 MMOL/L (ref 98–107)
CO2 SERPL-SCNC: 30 MMOL/L (ref 20–31)
CREAT SERPL-MCNC: 0.88 MG/DL (ref 0.55–1.3)
GFR SERPL CREATININE-BSD FRML MDRD: > 60 ML/MIN/{1.73_M2} (ref 32–?)
GLUCOSE SERPL-MCNC: 221 MG/DL (ref 74–106)
POTASSIUM SERPL-SCNC: 4.5 MMOL/L (ref 3.5–5.1)
SODIUM SERPL-SCNC: 125 MMOL/L (ref 136–145)

## 2024-08-16 ENCOUNTER — HOSPITAL ENCOUNTER (OUTPATIENT)
Dept: HOSPITAL 53 - SKLAB7 | Age: 89
End: 2024-08-16
Attending: INTERNAL MEDICINE
Payer: MEDICARE

## 2024-08-16 DIAGNOSIS — E87.1: Primary | ICD-10-CM

## 2024-08-16 LAB
BUN SERPL-MCNC: 46 MG/DL (ref 9–23)
CALCIUM SERPL-MCNC: 10 MG/DL (ref 8.3–10.6)
CHLORIDE SERPL-SCNC: 92 MMOL/L (ref 98–107)
CO2 SERPL-SCNC: 30 MMOL/L (ref 20–31)
CREAT SERPL-MCNC: 0.74 MG/DL (ref 0.55–1.3)
GFR SERPL CREATININE-BSD FRML MDRD: > 60 ML/MIN/{1.73_M2} (ref 32–?)
GLUCOSE SERPL-MCNC: 149 MG/DL (ref 74–106)
POTASSIUM SERPL-SCNC: 4.8 MMOL/L (ref 3.5–5.1)
SODIUM SERPL-SCNC: 125 MMOL/L (ref 136–145)

## 2024-08-19 ENCOUNTER — HOSPITAL ENCOUNTER (OUTPATIENT)
Dept: HOSPITAL 53 - SKLAB7 | Age: 89
End: 2024-08-19
Attending: INTERNAL MEDICINE
Payer: MEDICARE

## 2024-08-19 DIAGNOSIS — E87.1: Primary | ICD-10-CM

## 2024-08-19 LAB
BUN SERPL-MCNC: 35 MG/DL (ref 9–23)
CALCIUM SERPL-MCNC: 9.8 MG/DL (ref 8.3–10.6)
CHLORIDE SERPL-SCNC: 92 MMOL/L (ref 98–107)
CO2 SERPL-SCNC: 29 MMOL/L (ref 20–31)
CREAT SERPL-MCNC: 0.66 MG/DL (ref 0.55–1.3)
GFR SERPL CREATININE-BSD FRML MDRD: > 60 ML/MIN/{1.73_M2} (ref 32–?)
GLUCOSE SERPL-MCNC: 136 MG/DL (ref 74–106)
POTASSIUM SERPL-SCNC: 5 MMOL/L (ref 3.5–5.1)
SODIUM SERPL-SCNC: 125 MMOL/L (ref 136–145)

## 2024-08-21 ENCOUNTER — HOSPITAL ENCOUNTER (OUTPATIENT)
Dept: HOSPITAL 53 - SKLAB7 | Age: 89
End: 2024-08-21
Attending: INTERNAL MEDICINE
Payer: MEDICARE

## 2024-08-21 DIAGNOSIS — I50.9: Primary | ICD-10-CM

## 2024-08-21 LAB
BUN SERPL-MCNC: 23 MG/DL (ref 9–23)
CALCIUM SERPL-MCNC: 9.3 MG/DL (ref 8.3–10.6)
CHLORIDE SERPL-SCNC: 96 MMOL/L (ref 98–107)
CO2 SERPL-SCNC: 29 MMOL/L (ref 20–31)
CREAT SERPL-MCNC: 0.56 MG/DL (ref 0.55–1.3)
GFR SERPL CREATININE-BSD FRML MDRD: > 60 ML/MIN/{1.73_M2} (ref 32–?)
GLUCOSE SERPL-MCNC: 145 MG/DL (ref 74–106)
POTASSIUM SERPL-SCNC: 4.6 MMOL/L (ref 3.5–5.1)
SODIUM SERPL-SCNC: 126 MMOL/L (ref 136–145)

## 2024-08-30 ENCOUNTER — HOSPITAL ENCOUNTER (OUTPATIENT)
Dept: HOSPITAL 53 - SKLAB7 | Age: 89
End: 2024-08-30
Attending: INTERNAL MEDICINE
Payer: MEDICARE

## 2024-08-30 DIAGNOSIS — E87.1: Primary | ICD-10-CM

## 2024-08-30 LAB
BUN SERPL-MCNC: 18 MG/DL (ref 9–23)
CALCIUM SERPL-MCNC: 9.4 MG/DL (ref 8.3–10.6)
CHLORIDE SERPL-SCNC: 97 MMOL/L (ref 98–107)
CO2 SERPL-SCNC: 27 MMOL/L (ref 20–31)
CREAT SERPL-MCNC: 0.5 MG/DL (ref 0.55–1.3)
GFR SERPL CREATININE-BSD FRML MDRD: > 60 ML/MIN/{1.73_M2} (ref 32–?)
GLUCOSE SERPL-MCNC: 136 MG/DL (ref 74–106)
POTASSIUM SERPL-SCNC: 4.5 MMOL/L (ref 3.5–5.1)
SODIUM SERPL-SCNC: 128 MMOL/L (ref 136–145)

## 2024-09-10 ENCOUNTER — HOSPITAL ENCOUNTER (OUTPATIENT)
Dept: HOSPITAL 53 - SKLAB7 | Age: 89
End: 2024-09-10
Attending: INTERNAL MEDICINE
Payer: MEDICARE

## 2024-09-10 DIAGNOSIS — E87.1: ICD-10-CM

## 2024-09-10 DIAGNOSIS — I50.9: Primary | ICD-10-CM

## 2024-09-10 LAB
BUN SERPL-MCNC: 19 MG/DL (ref 9–23)
CALCIUM SERPL-MCNC: 9.1 MG/DL (ref 8.3–10.6)
CHLORIDE SERPL-SCNC: 93 MMOL/L (ref 98–107)
CO2 SERPL-SCNC: 27 MMOL/L (ref 20–31)
CREAT SERPL-MCNC: 0.54 MG/DL (ref 0.55–1.3)
GFR SERPL CREATININE-BSD FRML MDRD: > 60 ML/MIN/{1.73_M2} (ref 32–?)
GLUCOSE SERPL-MCNC: 225 MG/DL (ref 74–106)
POTASSIUM SERPL-SCNC: 4.4 MMOL/L (ref 3.5–5.1)
SODIUM SERPL-SCNC: 125 MMOL/L (ref 136–145)

## 2024-09-11 ENCOUNTER — HOSPITAL ENCOUNTER (OUTPATIENT)
Dept: HOSPITAL 53 - SKLAB7 | Age: 89
End: 2024-09-11
Attending: INTERNAL MEDICINE
Payer: MEDICARE

## 2024-09-11 ENCOUNTER — HOSPITAL ENCOUNTER (OUTPATIENT)
Dept: HOSPITAL 53 - SKLAB7 | Age: 89
End: 2024-09-11
Attending: NURSE PRACTITIONER
Payer: MEDICARE

## 2024-09-11 DIAGNOSIS — R30.0: ICD-10-CM

## 2024-09-11 DIAGNOSIS — E03.9: Primary | ICD-10-CM

## 2024-09-11 DIAGNOSIS — R30.0: Primary | ICD-10-CM

## 2024-09-11 DIAGNOSIS — E87.1: ICD-10-CM

## 2024-09-11 LAB
APPEARANCE UR: (no result)
BACTERIA UR QL AUTO: NEGATIVE
BILIRUB UR QL STRIP.AUTO: NEGATIVE
GLUCOSE UR QL STRIP.AUTO: NEGATIVE MG/DL
HGB UR QL STRIP.AUTO: NEGATIVE
KETONES UR QL STRIP.AUTO: NEGATIVE MG/DL
LEUKOCYTE ESTERASE UR QL STRIP.AUTO: (no result)
MUCOUS THREADS URNS QL MICRO: (no result)
NITRITE UR QL STRIP.AUTO: NEGATIVE
PH UR STRIP.AUTO: 6 UNITS (ref 5–9)
PROT UR QL STRIP.AUTO: (no result) MG/DL
RBC # UR AUTO: 4 /HPF (ref 0–3)
SP GR UR STRIP.AUTO: 1.01 (ref 1–1.03)
SQUAMOUS #/AREA URNS AUTO: 0 /HPF (ref 0–6)
UROBILINOGEN UR QL STRIP.AUTO: 0.2 MG/DL (ref 0–2)
WBC #/AREA URNS AUTO: (no result) /HPF (ref 0–3)

## 2024-09-13 ENCOUNTER — HOSPITAL ENCOUNTER (INPATIENT)
Dept: HOSPITAL 53 - M ED | Age: 89
LOS: 5 days | Discharge: NURSING, REHAB OR LONG-TERM CARE | DRG: 872 | End: 2024-09-18
Attending: STUDENT IN AN ORGANIZED HEALTH CARE EDUCATION/TRAINING PROGRAM | Admitting: STUDENT IN AN ORGANIZED HEALTH CARE EDUCATION/TRAINING PROGRAM
Payer: MEDICARE

## 2024-09-13 VITALS — HEIGHT: 60 IN | WEIGHT: 190.04 LBS | BODY MASS INDEX: 37.31 KG/M2

## 2024-09-13 VITALS — DIASTOLIC BLOOD PRESSURE: 71 MMHG | OXYGEN SATURATION: 96 % | SYSTOLIC BLOOD PRESSURE: 121 MMHG | TEMPERATURE: 98.3 F

## 2024-09-13 VITALS — OXYGEN SATURATION: 95 % | SYSTOLIC BLOOD PRESSURE: 143 MMHG | DIASTOLIC BLOOD PRESSURE: 65 MMHG | TEMPERATURE: 98.4 F

## 2024-09-13 VITALS — OXYGEN SATURATION: 98 %

## 2024-09-13 DIAGNOSIS — M10.9: ICD-10-CM

## 2024-09-13 DIAGNOSIS — N39.0: ICD-10-CM

## 2024-09-13 DIAGNOSIS — J44.1: ICD-10-CM

## 2024-09-13 DIAGNOSIS — I10: ICD-10-CM

## 2024-09-13 DIAGNOSIS — F32.9: ICD-10-CM

## 2024-09-13 DIAGNOSIS — E11.9: ICD-10-CM

## 2024-09-13 DIAGNOSIS — Z79.899: ICD-10-CM

## 2024-09-13 DIAGNOSIS — E87.1: ICD-10-CM

## 2024-09-13 DIAGNOSIS — Z95.1: ICD-10-CM

## 2024-09-13 DIAGNOSIS — K56.7: ICD-10-CM

## 2024-09-13 DIAGNOSIS — Z88.8: ICD-10-CM

## 2024-09-13 DIAGNOSIS — A41.9: Primary | ICD-10-CM

## 2024-09-13 DIAGNOSIS — D50.9: ICD-10-CM

## 2024-09-13 DIAGNOSIS — E78.5: ICD-10-CM

## 2024-09-13 DIAGNOSIS — E87.20: ICD-10-CM

## 2024-09-13 DIAGNOSIS — E03.9: ICD-10-CM

## 2024-09-13 DIAGNOSIS — I25.10: ICD-10-CM

## 2024-09-13 LAB
ALBUMIN SERPL BCG-MCNC: 3.3 G/DL (ref 3.2–5.2)
ALP SERPL-CCNC: 62 U/L (ref 46–116)
ALT SERPL W P-5'-P-CCNC: 17 U/L (ref 7–40)
AST SERPL-CCNC: 16 U/L (ref ?–34)
BASE EXCESS BLDV CALC-SCNC: -2.4 MMOL/L (ref -2–2)
BILIRUB CONJ SERPL-MCNC: 0.2 MG/DL (ref ?–0.4)
BILIRUB SERPL-MCNC: 0.5 MG/DL (ref 0.3–1.2)
BUN SERPL-MCNC: 30 MG/DL (ref 9–23)
BUN SERPL-MCNC: 31 MG/DL (ref 9–23)
CALCIUM SERPL-MCNC: 8.9 MG/DL (ref 8.3–10.6)
CALCIUM SERPL-MCNC: 9.8 MG/DL (ref 8.3–10.6)
CHLORIDE SERPL-SCNC: 96 MMOL/L (ref 98–107)
CHLORIDE SERPL-SCNC: 99 MMOL/L (ref 98–107)
CK MB CFR.DF SERPL CALC: 3.22
CK MB CFR.DF SERPL CALC: 3.57
CK MB SERPL-MCNC: < 1 NG/ML (ref ?–3.6)
CK MB SERPL-MCNC: < 1 NG/ML (ref ?–3.6)
CK SERPL-CCNC: 28 U/L (ref 34–145)
CK SERPL-CCNC: 31 U/L (ref 34–145)
CO2 BLDV CALC-SCNC: 23.7 MMOL/L (ref 24–28)
CO2 SERPL-SCNC: 23 MMOL/L (ref 20–31)
CO2 SERPL-SCNC: 24 MMOL/L (ref 20–31)
CREAT SERPL-MCNC: 0.74 MG/DL (ref 0.55–1.3)
CREAT SERPL-MCNC: 0.83 MG/DL (ref 0.55–1.3)
CRP SERPL-MCNC: 14.2 MG/DL (ref ?–1)
GFR SERPL CREATININE-BSD FRML MDRD: > 60 ML/MIN/{1.73_M2} (ref 32–?)
GFR SERPL CREATININE-BSD FRML MDRD: > 60 ML/MIN/{1.73_M2} (ref 32–?)
GLUCOSE SERPL-MCNC: 160 MG/DL (ref 74–106)
GLUCOSE SERPL-MCNC: 268 MG/DL (ref 74–106)
HCO3 BLDV-SCNC: 22.5 MMOL/L (ref 23–27)
HCO3 STD BLDV-SCNC: 22.3 MMOL/L
HCT VFR BLD AUTO: 32.3 % (ref 36–47)
HGB BLD-MCNC: 11.1 G/DL (ref 12–15.5)
INR PPP: 1.2
LIPASE SERPL-CCNC: 22 U/L (ref 12–53)
LYMPHOCYTES NFR BLD MANUAL: 5 % (ref 16–44)
MCH RBC QN AUTO: 31.9 PG (ref 27–33)
MCHC RBC AUTO-ENTMCNC: 34.4 G/DL (ref 32–36.5)
MCV RBC AUTO: 92.8 FL (ref 80–96)
METAMYELOCYTES NFR BLD MANUAL: 5 % (ref 0–0)
MONOCYTES NFR BLD MANUAL: 4 % (ref 0–5)
NEUTROPHILS NFR BLD MANUAL: 65 % (ref 28–66)
OSMOLALITY SERPL: 287 MOSM/KG (ref 280–301)
PCO2 BLDV: 39.6 MMHG (ref 38–50)
PH BLDV: 7.37 UNITS (ref 7.33–7.43)
PLATELET # BLD AUTO: 334 10^3/UL (ref 150–450)
PLATELET BLD QL SMEAR: NORMAL
PO2 BLDV: 62.2 MMHG (ref 30–50)
POTASSIUM SERPL-SCNC: 4.9 MMOL/L (ref 3.5–5.1)
POTASSIUM SERPL-SCNC: 4.9 MMOL/L (ref 3.5–5.1)
PROCALCITONIN SERPL-MCNC: 5.41 NG/ML
PROT SERPL-MCNC: 7.6 G/DL (ref 5.7–8.2)
PROTHROMBIN TIME: 14.9 SECONDS (ref 12.5–14.5)
RBC # BLD AUTO: 3.48 10^6/UL (ref 4–5.4)
RBC MORPH BLD: NORMAL
SAO2 % BLDV: 92.9 % (ref 60–80)
SODIUM SERPL-SCNC: 125 MMOL/L (ref 136–145)
SODIUM SERPL-SCNC: 128 MMOL/L (ref 136–145)
T4 FREE SERPL-MCNC: 0.99 NG/DL (ref 0.89–1.76)
TSH SERPL DL<=0.005 MIU/L-ACNC: 3.27 UIU/ML (ref 0.55–4.78)
WBC # BLD AUTO: 29 10^3/UL (ref 4–10)

## 2024-09-13 RX ADMIN — METHYLPREDNISOLONE SODIUM SUCCINATE ONE MG: 125 INJECTION, POWDER, FOR SOLUTION INTRAMUSCULAR; INTRAVENOUS at 12:46

## 2024-09-13 RX ADMIN — IPRATROPIUM BROMIDE AND ALBUTEROL SULFATE SCH ML: .5; 3 SOLUTION RESPIRATORY (INHALATION) at 20:03

## 2024-09-13 RX ADMIN — ACETAMINOPHEN ONE MG: 325 TABLET ORAL at 11:36

## 2024-09-13 RX ADMIN — ALBUTEROL SULFATE ONE MG: 2.5 SOLUTION RESPIRATORY (INHALATION) at 12:51

## 2024-09-13 RX ADMIN — DOXYCYCLINE HYCLATE SCH MG: 100 TABLET, COATED ORAL at 20:50

## 2024-09-13 RX ADMIN — MIRTAZAPINE SCH MG: 15 TABLET, FILM COATED ORAL at 20:50

## 2024-09-13 RX ADMIN — GABAPENTIN SCH MG: 100 CAPSULE ORAL at 20:50

## 2024-09-13 RX ADMIN — BISACODYL ONE MG: 10 SUPPOSITORY RECTAL at 15:57

## 2024-09-13 RX ADMIN — SODIUM CHLORIDE SCH UNITS: 4.5 INJECTION, SOLUTION INTRAVENOUS at 15:55

## 2024-09-13 RX ADMIN — DEXTROSE MONOHYDRATE ONE MLS/HR: 5 INJECTION INTRAVENOUS at 12:46

## 2024-09-13 RX ADMIN — SODIUM CHLORIDE ONE MLS/HR: 9 INJECTION, SOLUTION INTRAVENOUS at 12:46

## 2024-09-13 RX ADMIN — BUDESONIDE SCH MG: 0.5 INHALANT RESPIRATORY (INHALATION) at 20:03

## 2024-09-13 RX ADMIN — IPRATROPIUM BROMIDE AND ALBUTEROL SULFATE ONE ML: .5; 3 SOLUTION RESPIRATORY (INHALATION) at 12:51

## 2024-09-13 RX ADMIN — LIDOCAINE HYDROCHLORIDE ONE DOSE: 20 JELLY TOPICAL at 11:36

## 2024-09-13 RX ADMIN — BISACODYL SCH MG: 10 SUPPOSITORY RECTAL at 20:54

## 2024-09-13 RX ADMIN — PROBIOTIC PRODUCT - TAB SCH EA: TAB at 20:50

## 2024-09-13 RX ADMIN — DEXTROSE MONOHYDRATE SCH MLS/HR: 5 INJECTION INTRAVENOUS at 19:28

## 2024-09-13 RX ADMIN — FLUTICASONE PROPIONATE AND SALMETEROL XINAFOATE SCH PUFF: 115; 21 AEROSOL, METERED RESPIRATORY (INHALATION) at 20:52

## 2024-09-14 VITALS — OXYGEN SATURATION: 96 % | SYSTOLIC BLOOD PRESSURE: 134 MMHG | DIASTOLIC BLOOD PRESSURE: 63 MMHG | TEMPERATURE: 97.6 F

## 2024-09-14 VITALS — TEMPERATURE: 97.6 F | OXYGEN SATURATION: 96 % | SYSTOLIC BLOOD PRESSURE: 121 MMHG | DIASTOLIC BLOOD PRESSURE: 57 MMHG

## 2024-09-14 VITALS — SYSTOLIC BLOOD PRESSURE: 127 MMHG | DIASTOLIC BLOOD PRESSURE: 85 MMHG | TEMPERATURE: 97.7 F | OXYGEN SATURATION: 96 %

## 2024-09-14 VITALS — TEMPERATURE: 97.6 F | SYSTOLIC BLOOD PRESSURE: 169 MMHG | OXYGEN SATURATION: 96 % | DIASTOLIC BLOOD PRESSURE: 74 MMHG

## 2024-09-14 VITALS — TEMPERATURE: 97.7 F | DIASTOLIC BLOOD PRESSURE: 71 MMHG | SYSTOLIC BLOOD PRESSURE: 172 MMHG | OXYGEN SATURATION: 96 %

## 2024-09-14 VITALS — SYSTOLIC BLOOD PRESSURE: 133 MMHG | DIASTOLIC BLOOD PRESSURE: 61 MMHG | TEMPERATURE: 97.5 F | OXYGEN SATURATION: 96 %

## 2024-09-14 LAB
BASOPHILS # BLD AUTO: 0 10^3/UL (ref 0–0.2)
BASOPHILS NFR BLD AUTO: 0.2 % (ref 0–1)
BUN SERPL-MCNC: 29 MG/DL (ref 9–23)
CALCIUM SERPL-MCNC: 8.9 MG/DL (ref 8.3–10.6)
CHLORIDE SERPL-SCNC: 99 MMOL/L (ref 98–107)
CO2 SERPL-SCNC: 26 MMOL/L (ref 20–31)
CREAT SERPL-MCNC: 0.71 MG/DL (ref 0.55–1.3)
EOSINOPHIL # BLD AUTO: 0 10^3/UL (ref 0–0.5)
EOSINOPHIL NFR BLD AUTO: 0 % (ref 0–3)
GFR SERPL CREATININE-BSD FRML MDRD: > 60 ML/MIN/{1.73_M2} (ref 32–?)
GLUCOSE SERPL-MCNC: 189 MG/DL (ref 74–106)
HCT VFR BLD AUTO: 27.8 % (ref 36–47)
HGB BLD-MCNC: 9.4 G/DL (ref 12–15.5)
LYMPHOCYTES # BLD AUTO: 1 10^3/UL (ref 1.5–5)
LYMPHOCYTES NFR BLD AUTO: 4.9 % (ref 24–44)
MAGNESIUM SERPL-MCNC: 1.9 MG/DL (ref 1.8–2.4)
MCH RBC QN AUTO: 32 PG (ref 27–33)
MCHC RBC AUTO-ENTMCNC: 33.8 G/DL (ref 32–36.5)
MCV RBC AUTO: 94.6 FL (ref 80–96)
MONOCYTES # BLD AUTO: 0.2 10^3/UL (ref 0–0.8)
MONOCYTES NFR BLD AUTO: 1 % (ref 2–8)
NEUTROPHILS # BLD AUTO: 18.1 10^3/UL (ref 1.5–8.5)
NEUTROPHILS NFR BLD AUTO: 93 % (ref 36–66)
PLATELET # BLD AUTO: 266 10^3/UL (ref 150–450)
POTASSIUM SERPL-SCNC: 4.5 MMOL/L (ref 3.5–5.1)
RBC # BLD AUTO: 2.94 10^6/UL (ref 4–5.4)
SODIUM SERPL-SCNC: 128 MMOL/L (ref 136–145)
WBC # BLD AUTO: 19.5 10^3/UL (ref 4–10)

## 2024-09-14 RX ADMIN — METHYLPREDNISOLONE SODIUM SUCCINATE SCH MG: 40 INJECTION, POWDER, FOR SOLUTION INTRAMUSCULAR; INTRAVENOUS at 08:13

## 2024-09-14 RX ADMIN — DIVALPROEX SODIUM SCH MG: 250 TABLET, DELAYED RELEASE ORAL at 08:14

## 2024-09-14 RX ADMIN — SODIUM CHLORIDE SCH MLS/HR: 9 INJECTION, SOLUTION INTRAVENOUS at 17:42

## 2024-09-14 RX ADMIN — DULOXETINE HYDROCHLORIDE SCH MG: 30 CAPSULE, DELAYED RELEASE ORAL at 08:15

## 2024-09-14 RX ADMIN — LEVOTHYROXINE SODIUM SCH MCG: 25 TABLET ORAL at 06:20

## 2024-09-14 RX ADMIN — NYSTATIN SCH DOSE: 100000 POWDER TOPICAL at 14:09

## 2024-09-15 VITALS — TEMPERATURE: 96.9 F | SYSTOLIC BLOOD PRESSURE: 189 MMHG | DIASTOLIC BLOOD PRESSURE: 86 MMHG | OXYGEN SATURATION: 96 %

## 2024-09-15 VITALS — TEMPERATURE: 97.4 F | DIASTOLIC BLOOD PRESSURE: 80 MMHG | SYSTOLIC BLOOD PRESSURE: 194 MMHG | OXYGEN SATURATION: 96 %

## 2024-09-15 VITALS — DIASTOLIC BLOOD PRESSURE: 79 MMHG | SYSTOLIC BLOOD PRESSURE: 184 MMHG

## 2024-09-15 VITALS — DIASTOLIC BLOOD PRESSURE: 86 MMHG | HEART RATE: 70 BPM | OXYGEN SATURATION: 96 % | SYSTOLIC BLOOD PRESSURE: 180 MMHG

## 2024-09-15 VITALS — DIASTOLIC BLOOD PRESSURE: 78 MMHG | OXYGEN SATURATION: 96 % | TEMPERATURE: 97.2 F | SYSTOLIC BLOOD PRESSURE: 182 MMHG

## 2024-09-15 VITALS — TEMPERATURE: 97 F | SYSTOLIC BLOOD PRESSURE: 180 MMHG | OXYGEN SATURATION: 96 % | DIASTOLIC BLOOD PRESSURE: 86 MMHG

## 2024-09-15 VITALS — DIASTOLIC BLOOD PRESSURE: 72 MMHG | SYSTOLIC BLOOD PRESSURE: 190 MMHG

## 2024-09-15 VITALS — TEMPERATURE: 97.6 F | SYSTOLIC BLOOD PRESSURE: 192 MMHG | DIASTOLIC BLOOD PRESSURE: 90 MMHG | OXYGEN SATURATION: 96 %

## 2024-09-15 VITALS — DIASTOLIC BLOOD PRESSURE: 82 MMHG | OXYGEN SATURATION: 97 % | TEMPERATURE: 97.3 F | SYSTOLIC BLOOD PRESSURE: 180 MMHG

## 2024-09-15 LAB
BASOPHILS # BLD AUTO: 0 10^3/UL (ref 0–0.2)
BASOPHILS NFR BLD AUTO: 0.1 % (ref 0–1)
BUN SERPL-MCNC: 27 MG/DL (ref 9–23)
CALCIUM SERPL-MCNC: 9.2 MG/DL (ref 8.3–10.6)
CHLORIDE SERPL-SCNC: 98 MMOL/L (ref 98–107)
CO2 SERPL-SCNC: 22 MMOL/L (ref 20–31)
CREAT SERPL-MCNC: 0.58 MG/DL (ref 0.55–1.3)
EOSINOPHIL # BLD AUTO: 0 10^3/UL (ref 0–0.5)
EOSINOPHIL NFR BLD AUTO: 0 % (ref 0–3)
GFR SERPL CREATININE-BSD FRML MDRD: > 60 ML/MIN/{1.73_M2} (ref 32–?)
GLUCOSE SERPL-MCNC: 332 MG/DL (ref 74–106)
HCT VFR BLD AUTO: 27.6 % (ref 36–47)
HGB BLD-MCNC: 9.4 G/DL (ref 12–15.5)
LYMPHOCYTES # BLD AUTO: 0.4 10^3/UL (ref 1.5–5)
LYMPHOCYTES NFR BLD AUTO: 3.4 % (ref 24–44)
MAGNESIUM SERPL-MCNC: 1.8 MG/DL (ref 1.8–2.4)
MCH RBC QN AUTO: 31.4 PG (ref 27–33)
MCHC RBC AUTO-ENTMCNC: 34.1 G/DL (ref 32–36.5)
MCV RBC AUTO: 92.3 FL (ref 80–96)
MONOCYTES # BLD AUTO: 0.3 10^3/UL (ref 0–0.8)
MONOCYTES NFR BLD AUTO: 2.5 % (ref 2–8)
NEUTROPHILS # BLD AUTO: 10.2 10^3/UL (ref 1.5–8.5)
NEUTROPHILS NFR BLD AUTO: 92.1 % (ref 36–66)
PLATELET # BLD AUTO: 293 10^3/UL (ref 150–450)
POTASSIUM SERPL-SCNC: 4.9 MMOL/L (ref 3.5–5.1)
RBC # BLD AUTO: 2.99 10^6/UL (ref 4–5.4)
SODIUM SERPL-SCNC: 128 MMOL/L (ref 136–145)
WBC # BLD AUTO: 11 10^3/UL (ref 4–10)

## 2024-09-15 RX ADMIN — ACETAMINOPHEN PRN MG: 325 TABLET ORAL at 17:24

## 2024-09-15 RX ADMIN — RAMELTEON SCH MG: 8 TABLET ORAL at 21:22

## 2024-09-15 RX ADMIN — HYDRALAZINE HYDROCHLORIDE ONE MG: 10 TABLET, FILM COATED ORAL at 05:32

## 2024-09-15 RX ADMIN — INSULIN LISPRO SCH UNITS: 100 INJECTION, SOLUTION INTRAVENOUS; SUBCUTANEOUS at 17:26

## 2024-09-15 RX ADMIN — INSULIN LISPRO SCH UNITS: 100 INJECTION, SOLUTION INTRAVENOUS; SUBCUTANEOUS at 21:00

## 2024-09-15 RX ADMIN — HYDRALAZINE HYDROCHLORIDE STA MG: 20 INJECTION INTRAMUSCULAR; INTRAVENOUS at 10:05

## 2024-09-15 RX ADMIN — HYDRALAZINE HYDROCHLORIDE ONE MG: 10 TABLET, FILM COATED ORAL at 01:16

## 2024-09-15 RX ADMIN — HYDRALAZINE HYDROCHLORIDE SCH MG: 50 TABLET, FILM COATED ORAL at 17:25

## 2024-09-16 VITALS — DIASTOLIC BLOOD PRESSURE: 68 MMHG | SYSTOLIC BLOOD PRESSURE: 134 MMHG | TEMPERATURE: 97.3 F | OXYGEN SATURATION: 96 %

## 2024-09-16 VITALS — DIASTOLIC BLOOD PRESSURE: 84 MMHG | SYSTOLIC BLOOD PRESSURE: 180 MMHG | TEMPERATURE: 97.8 F | OXYGEN SATURATION: 95 %

## 2024-09-16 VITALS — DIASTOLIC BLOOD PRESSURE: 70 MMHG | SYSTOLIC BLOOD PRESSURE: 165 MMHG

## 2024-09-16 VITALS — OXYGEN SATURATION: 97 % | SYSTOLIC BLOOD PRESSURE: 157 MMHG | TEMPERATURE: 97.5 F | DIASTOLIC BLOOD PRESSURE: 76 MMHG

## 2024-09-16 VITALS — TEMPERATURE: 97.4 F | SYSTOLIC BLOOD PRESSURE: 188 MMHG | DIASTOLIC BLOOD PRESSURE: 82 MMHG | OXYGEN SATURATION: 97 %

## 2024-09-16 VITALS — SYSTOLIC BLOOD PRESSURE: 123 MMHG | OXYGEN SATURATION: 94 % | DIASTOLIC BLOOD PRESSURE: 60 MMHG | TEMPERATURE: 97 F

## 2024-09-16 VITALS — SYSTOLIC BLOOD PRESSURE: 154 MMHG | OXYGEN SATURATION: 96 % | DIASTOLIC BLOOD PRESSURE: 67 MMHG | TEMPERATURE: 98.3 F

## 2024-09-16 LAB
BASOPHILS # BLD AUTO: 0 10^3/UL (ref 0–0.2)
BASOPHILS NFR BLD AUTO: 0.2 % (ref 0–1)
BUN SERPL-MCNC: 23 MG/DL (ref 9–23)
CALCIUM SERPL-MCNC: 9.6 MG/DL (ref 8.3–10.6)
CHLORIDE SERPL-SCNC: 100 MMOL/L (ref 98–107)
CO2 SERPL-SCNC: 26 MMOL/L (ref 20–31)
CREAT SERPL-MCNC: 0.64 MG/DL (ref 0.55–1.3)
EOSINOPHIL # BLD AUTO: 0 10^3/UL (ref 0–0.5)
EOSINOPHIL NFR BLD AUTO: 0 % (ref 0–3)
GFR SERPL CREATININE-BSD FRML MDRD: > 60 ML/MIN/{1.73_M2} (ref 32–?)
GLUCOSE SERPL-MCNC: 173 MG/DL (ref 74–106)
HCT VFR BLD AUTO: 28.9 % (ref 36–47)
HGB BLD-MCNC: 9.5 G/DL (ref 12–15.5)
LYMPHOCYTES # BLD AUTO: 1.2 10^3/UL (ref 1.5–5)
LYMPHOCYTES NFR BLD AUTO: 11.2 % (ref 24–44)
MAGNESIUM SERPL-MCNC: 1.8 MG/DL (ref 1.8–2.4)
MCH RBC QN AUTO: 30.7 PG (ref 27–33)
MCHC RBC AUTO-ENTMCNC: 32.9 G/DL (ref 32–36.5)
MCV RBC AUTO: 93.5 FL (ref 80–96)
MONOCYTES # BLD AUTO: 0.4 10^3/UL (ref 0–0.8)
MONOCYTES NFR BLD AUTO: 4.1 % (ref 2–8)
NEUTROPHILS # BLD AUTO: 8.8 10^3/UL (ref 1.5–8.5)
NEUTROPHILS NFR BLD AUTO: 82.7 % (ref 36–66)
PLATELET # BLD AUTO: 288 10^3/UL (ref 150–450)
POTASSIUM SERPL-SCNC: 4.6 MMOL/L (ref 3.5–5.1)
RBC # BLD AUTO: 3.09 10^6/UL (ref 4–5.4)
SODIUM SERPL-SCNC: 131 MMOL/L (ref 136–145)
WBC # BLD AUTO: 10.6 10^3/UL (ref 4–10)

## 2024-09-16 RX ADMIN — AMOXICILLIN SCH MG: 875 TABLET, FILM COATED ORAL at 20:44

## 2024-09-16 RX ADMIN — HYDRALAZINE HYDROCHLORIDE SCH MG: 50 TABLET, FILM COATED ORAL at 12:45

## 2024-09-17 VITALS — DIASTOLIC BLOOD PRESSURE: 74 MMHG | OXYGEN SATURATION: 98 % | SYSTOLIC BLOOD PRESSURE: 166 MMHG | TEMPERATURE: 97.3 F

## 2024-09-17 VITALS — TEMPERATURE: 97.9 F | OXYGEN SATURATION: 97 % | DIASTOLIC BLOOD PRESSURE: 65 MMHG | SYSTOLIC BLOOD PRESSURE: 150 MMHG

## 2024-09-17 VITALS — DIASTOLIC BLOOD PRESSURE: 80 MMHG | SYSTOLIC BLOOD PRESSURE: 177 MMHG

## 2024-09-17 VITALS — DIASTOLIC BLOOD PRESSURE: 70 MMHG | SYSTOLIC BLOOD PRESSURE: 172 MMHG

## 2024-09-17 VITALS — SYSTOLIC BLOOD PRESSURE: 182 MMHG | TEMPERATURE: 97.5 F | OXYGEN SATURATION: 98 % | DIASTOLIC BLOOD PRESSURE: 68 MMHG

## 2024-09-17 VITALS — OXYGEN SATURATION: 96 % | TEMPERATURE: 97.2 F | DIASTOLIC BLOOD PRESSURE: 75 MMHG | SYSTOLIC BLOOD PRESSURE: 148 MMHG

## 2024-09-17 VITALS — OXYGEN SATURATION: 98 % | SYSTOLIC BLOOD PRESSURE: 154 MMHG | DIASTOLIC BLOOD PRESSURE: 80 MMHG

## 2024-09-17 VITALS — DIASTOLIC BLOOD PRESSURE: 74 MMHG | SYSTOLIC BLOOD PRESSURE: 185 MMHG | OXYGEN SATURATION: 97 % | TEMPERATURE: 97.9 F

## 2024-09-17 VITALS — DIASTOLIC BLOOD PRESSURE: 69 MMHG | SYSTOLIC BLOOD PRESSURE: 152 MMHG

## 2024-09-17 VITALS — TEMPERATURE: 97.6 F | DIASTOLIC BLOOD PRESSURE: 90 MMHG | SYSTOLIC BLOOD PRESSURE: 200 MMHG

## 2024-09-17 VITALS — TEMPERATURE: 97 F

## 2024-09-17 LAB
BASOPHILS # BLD AUTO: 0 10^3/UL (ref 0–0.2)
BASOPHILS NFR BLD AUTO: 0.4 % (ref 0–1)
BUN SERPL-MCNC: 21 MG/DL (ref 9–23)
CALCIUM SERPL-MCNC: 9.7 MG/DL (ref 8.3–10.6)
CHLORIDE SERPL-SCNC: 101 MMOL/L (ref 98–107)
CO2 SERPL-SCNC: 28 MMOL/L (ref 20–31)
CREAT SERPL-MCNC: 0.63 MG/DL (ref 0.55–1.3)
EOSINOPHIL # BLD AUTO: 0 10^3/UL (ref 0–0.5)
EOSINOPHIL NFR BLD AUTO: 0.4 % (ref 0–3)
GFR SERPL CREATININE-BSD FRML MDRD: > 60 ML/MIN/{1.73_M2} (ref 32–?)
GLUCOSE SERPL-MCNC: 126 MG/DL (ref 74–106)
HCT VFR BLD AUTO: 30.2 % (ref 36–47)
HGB BLD-MCNC: 10 G/DL (ref 12–15.5)
LYMPHOCYTES # BLD AUTO: 1.7 10^3/UL (ref 1.5–5)
LYMPHOCYTES NFR BLD AUTO: 22.6 % (ref 24–44)
MAGNESIUM SERPL-MCNC: 1.7 MG/DL (ref 1.8–2.4)
MCH RBC QN AUTO: 31 PG (ref 27–33)
MCHC RBC AUTO-ENTMCNC: 33.1 G/DL (ref 32–36.5)
MCV RBC AUTO: 93.5 FL (ref 80–96)
MONOCYTES # BLD AUTO: 0.4 10^3/UL (ref 0–0.8)
MONOCYTES NFR BLD AUTO: 5.8 % (ref 2–8)
NEUTROPHILS # BLD AUTO: 5 10^3/UL (ref 1.5–8.5)
NEUTROPHILS NFR BLD AUTO: 65.9 % (ref 36–66)
PLATELET # BLD AUTO: 364 10^3/UL (ref 150–450)
POTASSIUM SERPL-SCNC: 4.6 MMOL/L (ref 3.5–5.1)
RBC # BLD AUTO: 3.23 10^6/UL (ref 4–5.4)
SODIUM SERPL-SCNC: 132 MMOL/L (ref 136–145)
WBC # BLD AUTO: 7.6 10^3/UL (ref 4–10)

## 2024-09-17 RX ADMIN — HYDRALAZINE HYDROCHLORIDE SCH MG: 10 TABLET, FILM COATED ORAL at 13:43

## 2024-09-17 RX ADMIN — METOPROLOL TARTRATE SCH MG: 25 TABLET, FILM COATED ORAL at 08:54

## 2024-09-17 RX ADMIN — MAGNESIUM OXIDE SCH MG: 400 TABLET ORAL at 08:55

## 2024-09-17 RX ADMIN — MAGNESIUM SULFATE IN DEXTROSE ONE MLS/HR: 10 INJECTION, SOLUTION INTRAVENOUS at 08:56

## 2024-09-18 VITALS — SYSTOLIC BLOOD PRESSURE: 154 MMHG | DIASTOLIC BLOOD PRESSURE: 70 MMHG

## 2024-09-18 VITALS — SYSTOLIC BLOOD PRESSURE: 184 MMHG | DIASTOLIC BLOOD PRESSURE: 78 MMHG | OXYGEN SATURATION: 94 % | TEMPERATURE: 97.2 F

## 2024-09-18 VITALS — SYSTOLIC BLOOD PRESSURE: 158 MMHG | DIASTOLIC BLOOD PRESSURE: 68 MMHG

## 2024-09-18 VITALS — TEMPERATURE: 97.5 F | SYSTOLIC BLOOD PRESSURE: 156 MMHG | DIASTOLIC BLOOD PRESSURE: 64 MMHG | OXYGEN SATURATION: 97 %

## 2024-09-18 VITALS — DIASTOLIC BLOOD PRESSURE: 70 MMHG | SYSTOLIC BLOOD PRESSURE: 154 MMHG

## 2024-09-18 VITALS — OXYGEN SATURATION: 94 % | TEMPERATURE: 97.3 F | DIASTOLIC BLOOD PRESSURE: 70 MMHG | SYSTOLIC BLOOD PRESSURE: 185 MMHG

## 2024-09-18 LAB
BASOPHILS # BLD AUTO: 0.1 10^3/UL (ref 0–0.2)
BASOPHILS NFR BLD AUTO: 0.6 % (ref 0–1)
EOSINOPHIL # BLD AUTO: 0 10^3/UL (ref 0–0.5)
EOSINOPHIL NFR BLD AUTO: 0.5 % (ref 0–3)
HCT VFR BLD AUTO: 33.3 % (ref 36–47)
HGB BLD-MCNC: 11.1 G/DL (ref 12–15.5)
LYMPHOCYTES # BLD AUTO: 1.8 10^3/UL (ref 1.5–5)
LYMPHOCYTES NFR BLD AUTO: 22.7 % (ref 24–44)
MCH RBC QN AUTO: 30.8 PG (ref 27–33)
MCHC RBC AUTO-ENTMCNC: 33.3 G/DL (ref 32–36.5)
MCV RBC AUTO: 92.5 FL (ref 80–96)
MONOCYTES # BLD AUTO: 0.5 10^3/UL (ref 0–0.8)
MONOCYTES NFR BLD AUTO: 7 % (ref 2–8)
NEUTROPHILS # BLD AUTO: 5 10^3/UL (ref 1.5–8.5)
NEUTROPHILS NFR BLD AUTO: 64.2 % (ref 36–66)
PLATELET # BLD AUTO: 396 10^3/UL (ref 150–450)
RBC # BLD AUTO: 3.6 10^6/UL (ref 4–5.4)
WBC # BLD AUTO: 7.8 10^3/UL (ref 4–10)

## 2024-09-23 ENCOUNTER — HOSPITAL ENCOUNTER (OUTPATIENT)
Dept: HOSPITAL 53 - SKLAB7 | Age: 89
End: 2024-09-23
Attending: INTERNAL MEDICINE

## 2024-09-23 DIAGNOSIS — N39.0: Primary | ICD-10-CM

## 2024-09-23 LAB
BASOPHILS # BLD AUTO: 0.1 10^3/UL (ref 0–0.2)
BASOPHILS NFR BLD AUTO: 0.9 % (ref 0–1)
BUN SERPL-MCNC: 22 MG/DL (ref 9–23)
CALCIUM SERPL-MCNC: 9.1 MG/DL (ref 8.3–10.6)
CHLORIDE SERPL-SCNC: 104 MMOL/L (ref 98–107)
CO2 SERPL-SCNC: 25 MMOL/L (ref 20–31)
CREAT SERPL-MCNC: 0.62 MG/DL (ref 0.55–1.3)
EOSINOPHIL # BLD AUTO: 0 10^3/UL (ref 0–0.5)
EOSINOPHIL NFR BLD AUTO: 0.4 % (ref 0–3)
GFR SERPL CREATININE-BSD FRML MDRD: > 60 ML/MIN/{1.73_M2} (ref 32–?)
GLUCOSE SERPL-MCNC: 215 MG/DL (ref 74–106)
HCT VFR BLD AUTO: 33.8 % (ref 36–47)
HGB BLD-MCNC: 11.1 G/DL (ref 12–15.5)
LYMPHOCYTES # BLD AUTO: 1 10^3/UL (ref 1.5–5)
LYMPHOCYTES NFR BLD AUTO: 14.2 % (ref 24–44)
MCH RBC QN AUTO: 31.4 PG (ref 27–33)
MCHC RBC AUTO-ENTMCNC: 32.8 G/DL (ref 32–36.5)
MCV RBC AUTO: 95.8 FL (ref 80–96)
MONOCYTES # BLD AUTO: 0.5 10^3/UL (ref 0–0.8)
MONOCYTES NFR BLD AUTO: 7 % (ref 2–8)
NEUTROPHILS # BLD AUTO: 5.1 10^3/UL (ref 1.5–8.5)
NEUTROPHILS NFR BLD AUTO: 76.9 % (ref 36–66)
PLATELET # BLD AUTO: 406 10^3/UL (ref 150–450)
POTASSIUM SERPL-SCNC: 4.7 MMOL/L (ref 3.5–5.1)
RBC # BLD AUTO: 3.53 10^6/UL (ref 4–5.4)
SODIUM SERPL-SCNC: 131 MMOL/L (ref 136–145)
WBC # BLD AUTO: 6.7 10^3/UL (ref 4–10)

## 2024-09-30 ENCOUNTER — HOSPITAL ENCOUNTER (OUTPATIENT)
Dept: HOSPITAL 53 - SKLAB7 | Age: 89
End: 2024-09-30
Attending: INTERNAL MEDICINE
Payer: SELF-PAY

## 2024-09-30 DIAGNOSIS — E87.1: Primary | ICD-10-CM

## 2024-09-30 LAB
BUN SERPL-MCNC: 26 MG/DL (ref 9–23)
CALCIUM SERPL-MCNC: 10.1 MG/DL (ref 8.3–10.6)
CHLORIDE SERPL-SCNC: 101 MMOL/L (ref 98–107)
CO2 SERPL-SCNC: 23 MMOL/L (ref 20–31)
CREAT SERPL-MCNC: 0.53 MG/DL (ref 0.55–1.3)
GFR SERPL CREATININE-BSD FRML MDRD: > 60 ML/MIN/{1.73_M2} (ref 32–?)
GLUCOSE SERPL-MCNC: 157 MG/DL (ref 74–106)
POTASSIUM SERPL-SCNC: 5.1 MMOL/L (ref 3.5–5.1)
SODIUM SERPL-SCNC: 129 MMOL/L (ref 136–145)

## 2024-10-07 ENCOUNTER — HOSPITAL ENCOUNTER (OUTPATIENT)
Dept: HOSPITAL 53 - SKLAB7 | Age: 89
End: 2024-10-07
Attending: INTERNAL MEDICINE
Payer: SELF-PAY

## 2024-10-07 DIAGNOSIS — E87.1: Primary | ICD-10-CM

## 2024-10-07 DIAGNOSIS — Z79.899: ICD-10-CM

## 2024-10-07 LAB
BUN SERPL-MCNC: 19 MG/DL (ref 9–23)
CALCIUM SERPL-MCNC: 9.8 MG/DL (ref 8.3–10.6)
CHLORIDE SERPL-SCNC: 96 MMOL/L (ref 98–107)
CO2 SERPL-SCNC: 28 MMOL/L (ref 20–31)
CREAT SERPL-MCNC: 0.56 MG/DL (ref 0.55–1.3)
GFR SERPL CREATININE-BSD FRML MDRD: > 60 ML/MIN/{1.73_M2} (ref 32–?)
GLUCOSE SERPL-MCNC: 213 MG/DL (ref 74–106)
HCT VFR BLD AUTO: 35.9 % (ref 36–47)
HGB BLD-MCNC: 12 G/DL (ref 12–15.5)
MCH RBC QN AUTO: 31.5 PG (ref 27–33)
MCHC RBC AUTO-ENTMCNC: 33.4 G/DL (ref 32–36.5)
MCV RBC AUTO: 94.2 FL (ref 80–96)
PLATELET # BLD AUTO: 348 10^3/UL (ref 150–450)
POTASSIUM SERPL-SCNC: 4.4 MMOL/L (ref 3.5–5.1)
RBC # BLD AUTO: 3.81 10^6/UL (ref 4–5.4)
SODIUM SERPL-SCNC: 131 MMOL/L (ref 136–145)
WBC # BLD AUTO: 5.7 10^3/UL (ref 4–10)

## 2024-10-23 ENCOUNTER — HOSPITAL ENCOUNTER (OUTPATIENT)
Dept: HOSPITAL 53 - SKLAB7 | Age: 89
End: 2024-10-23
Attending: INTERNAL MEDICINE
Payer: MEDICARE

## 2024-10-23 DIAGNOSIS — L03.115: Primary | ICD-10-CM

## 2024-10-23 LAB
ALBUMIN SERPL BCG-MCNC: 3.1 G/DL (ref 3.2–5.2)
ALP SERPL-CCNC: 58 U/L (ref 46–116)
ALT SERPL W P-5'-P-CCNC: 14 U/L (ref 7–40)
AST SERPL-CCNC: 9 U/L (ref ?–34)
BASOPHILS # BLD AUTO: 0.1 10^3/UL (ref 0–0.2)
BASOPHILS NFR BLD AUTO: 1.2 % (ref 0–1)
BILIRUB SERPL-MCNC: 0.2 MG/DL (ref 0.3–1.2)
BUN SERPL-MCNC: 21 MG/DL (ref 9–23)
CALCIUM SERPL-MCNC: 9.7 MG/DL (ref 8.3–10.6)
CHLORIDE SERPL-SCNC: 99 MMOL/L (ref 98–107)
CO2 SERPL-SCNC: 29 MMOL/L (ref 20–31)
CREAT SERPL-MCNC: 0.55 MG/DL (ref 0.55–1.3)
EOSINOPHIL # BLD AUTO: 0.1 10^3/UL (ref 0–0.5)
EOSINOPHIL NFR BLD AUTO: 0.9 % (ref 0–3)
GFR SERPL CREATININE-BSD FRML MDRD: > 60 ML/MIN/{1.73_M2} (ref 32–?)
GLUCOSE SERPL-MCNC: 136 MG/DL (ref 74–106)
HCT VFR BLD AUTO: 32.8 % (ref 36–47)
HGB BLD-MCNC: 11.3 G/DL (ref 12–15.5)
LYMPHOCYTES # BLD AUTO: 1.2 10^3/UL (ref 1.5–5)
LYMPHOCYTES NFR BLD AUTO: 21 % (ref 24–44)
MCH RBC QN AUTO: 31.5 PG (ref 27–33)
MCHC RBC AUTO-ENTMCNC: 34.5 G/DL (ref 32–36.5)
MCV RBC AUTO: 91.4 FL (ref 80–96)
MONOCYTES # BLD AUTO: 0.5 10^3/UL (ref 0–0.8)
MONOCYTES NFR BLD AUTO: 8.4 % (ref 2–8)
NEUTROPHILS # BLD AUTO: 3.9 10^3/UL (ref 1.5–8.5)
NEUTROPHILS NFR BLD AUTO: 68 % (ref 36–66)
PLATELET # BLD AUTO: 260 10^3/UL (ref 150–450)
POTASSIUM SERPL-SCNC: 4.8 MMOL/L (ref 3.5–5.1)
PROT SERPL-MCNC: 6.6 G/DL (ref 5.7–8.2)
RBC # BLD AUTO: 3.59 10^6/UL (ref 4–5.4)
SODIUM SERPL-SCNC: 129 MMOL/L (ref 136–145)
WBC # BLD AUTO: 5.7 10^3/UL (ref 4–10)

## 2024-10-25 ENCOUNTER — HOSPITAL ENCOUNTER (OUTPATIENT)
Dept: HOSPITAL 53 - SKLAB7 | Age: 89
End: 2024-10-25
Attending: INTERNAL MEDICINE
Payer: MEDICARE

## 2024-10-25 DIAGNOSIS — E87.1: ICD-10-CM

## 2024-10-25 DIAGNOSIS — Z79.899: Primary | ICD-10-CM

## 2024-10-25 LAB
BUN SERPL-MCNC: 19 MG/DL (ref 9–23)
CALCIUM SERPL-MCNC: 9.7 MG/DL (ref 8.3–10.6)
CHLORIDE SERPL-SCNC: 100 MMOL/L (ref 98–107)
CO2 SERPL-SCNC: 26 MMOL/L (ref 20–31)
CREAT SERPL-MCNC: 0.48 MG/DL (ref 0.55–1.3)
GFR SERPL CREATININE-BSD FRML MDRD: > 60 ML/MIN/{1.73_M2} (ref 32–?)
GLUCOSE SERPL-MCNC: 174 MG/DL (ref 74–106)
HCT VFR BLD AUTO: 33.5 % (ref 36–47)
HGB BLD-MCNC: 11.3 G/DL (ref 12–15.5)
MCH RBC QN AUTO: 30.8 PG (ref 27–33)
MCHC RBC AUTO-ENTMCNC: 33.7 G/DL (ref 32–36.5)
MCV RBC AUTO: 91.3 FL (ref 80–96)
PLATELET # BLD AUTO: 267 10^3/UL (ref 150–450)
POTASSIUM SERPL-SCNC: 4.6 MMOL/L (ref 3.5–5.1)
RBC # BLD AUTO: 3.67 10^6/UL (ref 4–5.4)
SODIUM SERPL-SCNC: 130 MMOL/L (ref 136–145)
WBC # BLD AUTO: 6.3 10^3/UL (ref 4–10)

## 2024-11-04 ENCOUNTER — HOSPITAL ENCOUNTER (OUTPATIENT)
Dept: HOSPITAL 53 - SKLAB7 | Age: 89
End: 2024-11-04
Attending: INTERNAL MEDICINE
Payer: MEDICARE

## 2024-11-04 DIAGNOSIS — E87.1: Primary | ICD-10-CM

## 2024-11-04 DIAGNOSIS — Z79.899: ICD-10-CM

## 2024-11-04 LAB
BUN SERPL-MCNC: 23 MG/DL (ref 9–23)
CALCIUM SERPL-MCNC: 10 MG/DL (ref 8.3–10.6)
CHLORIDE SERPL-SCNC: 96 MMOL/L (ref 98–107)
CO2 SERPL-SCNC: 26 MMOL/L (ref 20–31)
CREAT SERPL-MCNC: 0.5 MG/DL (ref 0.55–1.3)
GFR SERPL CREATININE-BSD FRML MDRD: > 60 ML/MIN/{1.73_M2} (ref 32–?)
GLUCOSE SERPL-MCNC: 173 MG/DL (ref 74–106)
HCT VFR BLD AUTO: 34.2 % (ref 36–47)
HGB BLD-MCNC: 11.6 G/DL (ref 12–15.5)
MCH RBC QN AUTO: 31.8 PG (ref 27–33)
MCHC RBC AUTO-ENTMCNC: 33.9 G/DL (ref 32–36.5)
MCV RBC AUTO: 93.7 FL (ref 80–96)
PLATELET # BLD AUTO: 302 10^3/UL (ref 150–450)
POTASSIUM SERPL-SCNC: 4.7 MMOL/L (ref 3.5–5.1)
RBC # BLD AUTO: 3.65 10^6/UL (ref 4–5.4)
SODIUM SERPL-SCNC: 129 MMOL/L (ref 136–145)
WBC # BLD AUTO: 6.1 10^3/UL (ref 4–10)

## 2024-11-25 ENCOUNTER — HOSPITAL ENCOUNTER (OUTPATIENT)
Dept: HOSPITAL 53 - SKLAB7 | Age: 89
End: 2024-11-25
Attending: INTERNAL MEDICINE
Payer: MEDICARE

## 2024-11-25 DIAGNOSIS — M10.9: Primary | ICD-10-CM

## 2024-11-25 DIAGNOSIS — E11.9: ICD-10-CM

## 2024-11-25 DIAGNOSIS — E03.9: ICD-10-CM

## 2024-11-25 LAB
EST. AVERAGE GLUCOSE BLD GHB EST-MCNC: 154 MG/DL (ref 60–110)
TSH SERPL DL<=0.005 MIU/L-ACNC: 0.94 UIU/ML (ref 0.55–4.78)
URATE SERPL-MCNC: 3.4 MG/DL (ref 3.1–7.8)

## 2024-11-26 ENCOUNTER — HOSPITAL ENCOUNTER (OUTPATIENT)
Dept: HOSPITAL 53 - SKLAB7 | Age: 89
End: 2024-11-26
Attending: INTERNAL MEDICINE
Payer: MEDICARE

## 2024-11-26 DIAGNOSIS — Z79.899: Primary | ICD-10-CM

## 2024-11-26 LAB
BUN SERPL-MCNC: 23 MG/DL (ref 9–23)
CALCIUM SERPL-MCNC: 9.9 MG/DL (ref 8.3–10.6)
CHLORIDE SERPL-SCNC: 92 MMOL/L (ref 98–107)
CO2 SERPL-SCNC: 29 MMOL/L (ref 20–31)
CREAT SERPL-MCNC: 0.53 MG/DL (ref 0.55–1.3)
GFR SERPL CREATININE-BSD FRML MDRD: > 60 ML/MIN/{1.73_M2} (ref 32–?)
GLUCOSE SERPL-MCNC: 191 MG/DL (ref 74–106)
HCT VFR BLD AUTO: 35.5 % (ref 36–47)
HGB BLD-MCNC: 12 G/DL (ref 12–15.5)
MCH RBC QN AUTO: 30.9 PG (ref 27–33)
MCHC RBC AUTO-ENTMCNC: 33.8 G/DL (ref 32–36.5)
MCV RBC AUTO: 91.5 FL (ref 80–96)
PLATELET # BLD AUTO: 354 10^3/UL (ref 150–450)
POTASSIUM SERPL-SCNC: 3.9 MMOL/L (ref 3.5–5.1)
RBC # BLD AUTO: 3.88 10^6/UL (ref 4–5.4)
SODIUM SERPL-SCNC: 127 MMOL/L (ref 136–145)
WBC # BLD AUTO: 10.9 10^3/UL (ref 4–10)

## 2024-12-09 ENCOUNTER — HOSPITAL ENCOUNTER (OUTPATIENT)
Dept: HOSPITAL 53 - M RAD | Age: 89
End: 2024-12-09
Attending: NURSE PRACTITIONER
Payer: MEDICARE

## 2024-12-09 DIAGNOSIS — S12.112D: Primary | ICD-10-CM

## 2024-12-12 ENCOUNTER — HOSPITAL ENCOUNTER (OUTPATIENT)
Dept: HOSPITAL 53 - SKLAB7 | Age: 89
End: 2024-12-12
Attending: INTERNAL MEDICINE
Payer: MEDICARE

## 2024-12-12 DIAGNOSIS — R41.82: Primary | ICD-10-CM

## 2024-12-12 LAB
BASOPHILS # BLD AUTO: 0 10^3/UL (ref 0–0.2)
BASOPHILS NFR BLD AUTO: 0.5 % (ref 0–1)
BUN SERPL-MCNC: 30 MG/DL (ref 9–23)
CALCIUM SERPL-MCNC: 10.2 MG/DL (ref 8.3–10.6)
CHLORIDE SERPL-SCNC: 100 MMOL/L (ref 98–107)
CO2 SERPL-SCNC: 29 MMOL/L (ref 20–31)
CREAT SERPL-MCNC: 0.55 MG/DL (ref 0.55–1.3)
EOSINOPHIL # BLD AUTO: 0 10^3/UL (ref 0–0.5)
EOSINOPHIL NFR BLD AUTO: 0.4 % (ref 0–3)
GFR SERPL CREATININE-BSD FRML MDRD: > 60 ML/MIN/{1.73_M2} (ref 32–?)
GLUCOSE SERPL-MCNC: 185 MG/DL (ref 74–106)
HCT VFR BLD AUTO: 34 % (ref 36–47)
HGB BLD-MCNC: 11.6 G/DL (ref 12–15.5)
LYMPHOCYTES # BLD AUTO: 2.3 10^3/UL (ref 1.5–5)
LYMPHOCYTES NFR BLD AUTO: 27.9 % (ref 24–44)
MCH RBC QN AUTO: 32.4 PG (ref 27–33)
MCHC RBC AUTO-ENTMCNC: 34.1 G/DL (ref 32–36.5)
MCV RBC AUTO: 95 FL (ref 80–96)
MONOCYTES # BLD AUTO: 0.5 10^3/UL (ref 0–0.8)
MONOCYTES NFR BLD AUTO: 5.4 % (ref 2–8)
NEUTROPHILS # BLD AUTO: 5.4 10^3/UL (ref 1.5–8.5)
NEUTROPHILS NFR BLD AUTO: 64.8 % (ref 36–66)
PLATELET # BLD AUTO: 290 10^3/UL (ref 150–450)
POTASSIUM SERPL-SCNC: 4.6 MMOL/L (ref 3.5–5.1)
RBC # BLD AUTO: 3.58 10^6/UL (ref 4–5.4)
SODIUM SERPL-SCNC: 135 MMOL/L (ref 136–145)
WBC # BLD AUTO: 8.3 10^3/UL (ref 4–10)

## 2024-12-26 ENCOUNTER — HOSPITAL ENCOUNTER (OUTPATIENT)
Dept: HOSPITAL 53 - SKLAB7 | Age: 89
End: 2024-12-26
Attending: INTERNAL MEDICINE
Payer: MEDICARE

## 2024-12-26 DIAGNOSIS — E87.1: Primary | ICD-10-CM

## 2024-12-26 LAB
BUN SERPL-MCNC: 31 MG/DL (ref 9–23)
CALCIUM SERPL-MCNC: 10 MG/DL (ref 8.3–10.6)
CHLORIDE SERPL-SCNC: 101 MMOL/L (ref 98–107)
CO2 SERPL-SCNC: 29 MMOL/L (ref 20–31)
CREAT SERPL-MCNC: 0.7 MG/DL (ref 0.55–1.3)
GFR SERPL CREATININE-BSD FRML MDRD: > 60 ML/MIN/{1.73_M2} (ref 32–?)
GLUCOSE SERPL-MCNC: 193 MG/DL (ref 74–106)
POTASSIUM SERPL-SCNC: 5.1 MMOL/L (ref 3.5–5.1)
SODIUM SERPL-SCNC: 136 MMOL/L (ref 136–145)

## 2025-01-28 ENCOUNTER — HOSPITAL ENCOUNTER (OUTPATIENT)
Dept: HOSPITAL 53 - SKLAB7 | Age: 89
End: 2025-01-28
Attending: INTERNAL MEDICINE
Payer: MEDICARE

## 2025-01-28 DIAGNOSIS — N75.1: Primary | ICD-10-CM

## 2025-01-28 LAB
BASOPHILS # BLD AUTO: 0.1 10^3/UL (ref 0–0.2)
BASOPHILS NFR BLD AUTO: 0.8 % (ref 0–1)
BUN SERPL-MCNC: 33 MG/DL (ref 9–23)
CALCIUM SERPL-MCNC: 9.2 MG/DL (ref 8.3–10.6)
CHLORIDE SERPL-SCNC: 94 MMOL/L (ref 98–107)
CO2 SERPL-SCNC: 28 MMOL/L (ref 20–31)
CREAT SERPL-MCNC: 0.6 MG/DL (ref 0.55–1.3)
EOSINOPHIL # BLD AUTO: 0.1 10^3/UL (ref 0–0.5)
EOSINOPHIL NFR BLD AUTO: 1.1 % (ref 0–3)
GFR SERPL CREATININE-BSD FRML MDRD: > 60 ML/MIN/{1.73_M2} (ref 32–?)
GLUCOSE SERPL-MCNC: 148 MG/DL (ref 74–106)
HCT VFR BLD AUTO: 31.7 % (ref 36–47)
HGB BLD-MCNC: 10.6 G/DL (ref 12–15.5)
LYMPHOCYTES # BLD AUTO: 1.2 10^3/UL (ref 1.5–5)
LYMPHOCYTES NFR BLD AUTO: 18.7 % (ref 24–44)
MCH RBC QN AUTO: 32.1 PG (ref 27–33)
MCHC RBC AUTO-ENTMCNC: 33.4 G/DL (ref 32–36.5)
MCV RBC AUTO: 96.1 FL (ref 80–96)
MONOCYTES # BLD AUTO: 0.5 10^3/UL (ref 0–0.8)
MONOCYTES NFR BLD AUTO: 8.4 % (ref 2–8)
NEUTROPHILS # BLD AUTO: 4.3 10^3/UL (ref 1.5–8.5)
NEUTROPHILS NFR BLD AUTO: 69.9 % (ref 36–66)
PLATELET # BLD AUTO: 231 10^3/UL (ref 150–450)
POTASSIUM SERPL-SCNC: 4.7 MMOL/L (ref 3.5–5.1)
RBC # BLD AUTO: 3.3 10^6/UL (ref 4–5.4)
SODIUM SERPL-SCNC: 132 MMOL/L (ref 136–145)
WBC # BLD AUTO: 6.2 10^3/UL (ref 4–10)

## 2025-02-10 ENCOUNTER — HOSPITAL ENCOUNTER (OUTPATIENT)
Dept: HOSPITAL 53 - SKLAB7 | Age: 89
End: 2025-02-10
Attending: INTERNAL MEDICINE
Payer: MEDICARE

## 2025-02-10 DIAGNOSIS — E87.1: Primary | ICD-10-CM

## 2025-02-10 LAB
BUN SERPL-MCNC: 35 MG/DL (ref 9–23)
CALCIUM SERPL-MCNC: 9.1 MG/DL (ref 8.3–10.6)
CHLORIDE SERPL-SCNC: 96 MMOL/L (ref 98–107)
CO2 SERPL-SCNC: 29 MMOL/L (ref 20–31)
CREAT SERPL-MCNC: 0.77 MG/DL (ref 0.55–1.3)
GFR SERPL CREATININE-BSD FRML MDRD: > 60 ML/MIN/{1.73_M2} (ref 32–?)
GLUCOSE SERPL-MCNC: 157 MG/DL (ref 74–106)
POTASSIUM SERPL-SCNC: 4.9 MMOL/L (ref 3.5–5.1)
SODIUM SERPL-SCNC: 133 MMOL/L (ref 136–145)

## 2025-03-04 ENCOUNTER — HOSPITAL ENCOUNTER (OUTPATIENT)
Dept: HOSPITAL 53 - SKLAB7 | Age: 89
End: 2025-03-04
Attending: INTERNAL MEDICINE
Payer: MEDICARE

## 2025-03-04 DIAGNOSIS — Z53.9: Primary | ICD-10-CM

## 2025-03-05 ENCOUNTER — HOSPITAL ENCOUNTER (OUTPATIENT)
Dept: HOSPITAL 53 - SKLAB7 | Age: 89
End: 2025-03-05
Attending: INTERNAL MEDICINE
Payer: MEDICARE

## 2025-03-05 DIAGNOSIS — R30.0: Primary | ICD-10-CM

## 2025-03-05 LAB
LEUKOCYTE ESTERASE UR AUTO RFX: (no result)
SQUAM EPITHELIAL CELL UR AURFX: 0 /HPF (ref 0–6)

## 2025-07-16 ENCOUNTER — HOSPITAL ENCOUNTER (OUTPATIENT)
Dept: HOSPITAL 53 - M RAD | Age: 89
End: 2025-07-16
Attending: NURSE PRACTITIONER
Payer: COMMERCIAL

## 2025-07-16 DIAGNOSIS — Z87.81: ICD-10-CM

## 2025-07-16 DIAGNOSIS — M50.322: ICD-10-CM

## 2025-07-16 DIAGNOSIS — M47.812: Primary | ICD-10-CM

## 2025-07-16 DIAGNOSIS — M25.78: ICD-10-CM
